# Patient Record
Sex: MALE | Race: BLACK OR AFRICAN AMERICAN | NOT HISPANIC OR LATINO | Employment: FULL TIME | URBAN - METROPOLITAN AREA
[De-identification: names, ages, dates, MRNs, and addresses within clinical notes are randomized per-mention and may not be internally consistent; named-entity substitution may affect disease eponyms.]

---

## 2017-01-31 ENCOUNTER — OFFICE VISIT (OUTPATIENT)
Dept: FAMILY MEDICINE | Facility: CLINIC | Age: 57
End: 2017-01-31
Payer: COMMERCIAL

## 2017-01-31 ENCOUNTER — LAB VISIT (OUTPATIENT)
Dept: LAB | Facility: HOSPITAL | Age: 57
End: 2017-01-31
Attending: FAMILY MEDICINE
Payer: COMMERCIAL

## 2017-01-31 VITALS
WEIGHT: 177.5 LBS | BODY MASS INDEX: 26.9 KG/M2 | HEART RATE: 80 BPM | TEMPERATURE: 98 F | HEIGHT: 68 IN | DIASTOLIC BLOOD PRESSURE: 76 MMHG | SYSTOLIC BLOOD PRESSURE: 142 MMHG

## 2017-01-31 DIAGNOSIS — Z00.00 GENERAL MEDICAL EXAM: ICD-10-CM

## 2017-01-31 DIAGNOSIS — E78.00 PURE HYPERCHOLESTEROLEMIA: ICD-10-CM

## 2017-01-31 DIAGNOSIS — N52.9 VASCULOGENIC ERECTILE DYSFUNCTION, UNSPECIFIED VASCULOGENIC ERECTILE DYSFUNCTION TYPE: ICD-10-CM

## 2017-01-31 DIAGNOSIS — R35.0 URINE FREQUENCY: ICD-10-CM

## 2017-01-31 DIAGNOSIS — L08.9 INFECTED SEBACEOUS CYST: ICD-10-CM

## 2017-01-31 DIAGNOSIS — I10 ESSENTIAL HYPERTENSION: ICD-10-CM

## 2017-01-31 DIAGNOSIS — L72.3 INFECTED SEBACEOUS CYST: ICD-10-CM

## 2017-01-31 LAB — COMPLEXED PSA SERPL-MCNC: 1.7 NG/ML

## 2017-01-31 PROCEDURE — 36415 COLL VENOUS BLD VENIPUNCTURE: CPT | Mod: PO

## 2017-01-31 PROCEDURE — 1159F MED LIST DOCD IN RCRD: CPT | Mod: S$GLB,,, | Performed by: FAMILY MEDICINE

## 2017-01-31 PROCEDURE — 3078F DIAST BP <80 MM HG: CPT | Mod: S$GLB,,, | Performed by: FAMILY MEDICINE

## 2017-01-31 PROCEDURE — 3045F PR MOST RECENT HEMOGLOBIN A1C LEVEL 7.0-9.0%: CPT | Mod: S$GLB,,, | Performed by: FAMILY MEDICINE

## 2017-01-31 PROCEDURE — 84153 ASSAY OF PSA TOTAL: CPT

## 2017-01-31 PROCEDURE — 99999 PR PBB SHADOW E&M-EST. PATIENT-LVL III: CPT | Mod: PBBFAC,,, | Performed by: FAMILY MEDICINE

## 2017-01-31 PROCEDURE — 99214 OFFICE O/P EST MOD 30 MIN: CPT | Mod: 25,S$GLB,, | Performed by: FAMILY MEDICINE

## 2017-01-31 PROCEDURE — 10160 PNXR ASPIR ABSC HMTMA BULLA: CPT | Mod: S$GLB,,, | Performed by: FAMILY MEDICINE

## 2017-01-31 PROCEDURE — 4010F ACE/ARB THERAPY RXD/TAKEN: CPT | Mod: S$GLB,,, | Performed by: FAMILY MEDICINE

## 2017-01-31 PROCEDURE — 3077F SYST BP >= 140 MM HG: CPT | Mod: S$GLB,,, | Performed by: FAMILY MEDICINE

## 2017-01-31 RX ORDER — AMLODIPINE BESYLATE 10 MG/1
10 TABLET ORAL NIGHTLY
Qty: 30 TABLET | Refills: 11 | Status: SHIPPED | OUTPATIENT
Start: 2017-01-31 | End: 2017-07-26 | Stop reason: SDUPTHER

## 2017-01-31 NOTE — MR AVS SNAPSHOT
[facility]  [address] [facility], [address]  [facility] [address]  Phone: [phone]  Fax: [phone]                  Parth Lund   2017 2:40 PM   Office Visit    Description:  Male : 1960   Provider:  Tara Pickett DO   Department:  [facility]           Reason for Visit     Lump     Hypertension           Diagnoses this Visit        Comments    DM (diabetes mellitus), type 2, uncontrolled, periph vascular complic    -  Primary     Essential hypertension         Pure hypercholesterolemia         General medical exam         Urine frequency         Vasculogenic erectile dysfunction, unspecified vasculogenic erectile dysfunction type                To Do List           Goals (5 Years of Data)     None       These Medications        Disp Refills Start End    amlodipine (NORVASC) 10 MG tablet 30 tablet 11 2017    Take 1 tablet (10 mg total) by mouth every evening. - Oral    Pharmacy: [facility] #[id number] [address] Ph #: [phone]         [facility] On Call     [facility] On Call Nurse Care Line -  Assistance  Registered nurses in the [facility] On Call Center provide clinical advisement, health education, appointment booking, and other advisory services.  Call for this free service at [phone].             Medications           START taking these NEW medications        Refills    amlodipine (NORVASC) 10 MG tablet 11    Sig: Take 1 tablet (10 mg total) by mouth every evening.    Class: Normal    Route: Oral           Verify that the below list of medications is an accurate representation of the medications you are currently taking.  If none reported, the list may be blank. If incorrect, please contact your healthcare provider. Carry this list with you in case of emergency.           Current Medications     atorvastatin (LIPITOR) 20 MG tablet TAKE ONE TABLET BY MOUTH EVERY DAY    blood sugar diagnostic Strp 1 each  "by Misc.(Non-Drug; Combo Route) route 2 (two) times daily.    lancets Misc 1 each by Misc.(Non-Drug; Combo Route) route 2 (two) times daily. One touch ultra mini    losartan (COZAAR) 100 MG tablet Take 1 tablet (100 mg total) by mouth once daily.    sildenafil (VIAGRA) 100 MG tablet Take 1 tablet (100 mg total) by mouth daily as needed for Erectile Dysfunction.    amlodipine (NORVASC) 10 MG tablet Take 1 tablet (10 mg total) by mouth every evening.    metformin (GLUCOPHAGE) 1000 MG tablet Take 1 tablet (1,000 mg total) by mouth daily with breakfast.           Clinical Reference Information           Vital Signs - Last Recorded  Most recent update: 1/31/2017  2:34 PM by Kristie Trejo MA    BP Pulse Temp Ht Wt BMI    (!) 142/76 (BP Location: Right arm, Patient Position: Sitting, BP Method: Manual) 80 98 °F (36.7 °C) (Oral) 5' 8" (1.727 m) 80.5 kg (177 lb 7.5 oz) 26.98 kg/m2      Blood Pressure          Most Recent Value    BP  (!)  142/76      Allergies as of 1/31/2017     No Known Allergies      Immunizations Administered on Date of Encounter - 1/31/2017     None      Orders Placed During Today's Visit      Normal Orders This Visit    Microalbumin/creatinine urine ratio     Future Labs/Procedures Expected by Expires    CBC auto differential  1/31/2017 4/1/2018    Comprehensive metabolic panel  1/31/2017 4/1/2018    Hemoglobin A1c  1/31/2017 4/1/2018    Lipid panel  1/31/2017 4/1/2018    PSA, Screening  1/31/2017 4/1/2018    TSH  1/31/2017 4/1/2018      "

## 2017-01-31 NOTE — PROGRESS NOTES
Subjective:       Patient ID: Parth Lund is a 56 y.o. male.    Chief Complaint: diabetes, and Hypertension   HPI   This pleasant 56 y.o. Black or  male  presents for management  of their medical problems including   Patient Active Problem List   Diagnosis    Hypertension    DM (diabetes mellitus), type 2, uncontrolled, periph vascular complic    Cortical cataract - Both Eyes    HLD (hyperlipidemia)    he is feeling fine overall    He is still drinking some sugary drinks daily, some carbs (rice and beens) etc  He is active with work.  He is also complaining of urine frequency and mild urgency. He has to urinate every 2 hours in daytime and hast to get up 2 or 3 times a night.   He sometimes feels he is not emptying his bladder completely.  Review of Systems  no fever or chills or nausea or vomiting, no edema   Objective:      Physical Exam   Constitutional: He is oriented to person, place, and time. He appears well-developed.   HENT:   Head: Normocephalic and atraumatic.   Cardiovascular: Normal rate, regular rhythm, normal heart sounds and intact distal pulses.    Pulmonary/Chest: Effort normal and breath sounds normal.   Abdominal: Soft. Bowel sounds are normal.   Genitourinary: Rectum normal and prostate normal.   Genitourinary Comments: Prostate normal size and shape, nontender no masses   Musculoskeletal: Normal range of motion. He exhibits no edema.   Neurological: He is alert and oriented to person, place, and time.   Skin: Skin is warm and dry.   Psychiatric: He has a normal mood and affect. His behavior is normal. Judgment and thought content normal.   Nursing note and vitals reviewed.      Assessment:         Parth was seen today for lump and hypertension.    Diagnoses and all orders for this visit:    DM (diabetes mellitus), type 2, uncontrolled, periph vascular complic  -     Hemoglobin A1c; Future  -     Cancel: Microalbumin/creatinine urine ratio    Essential hypertension  -      Cancel: Microalbumin/creatinine urine ratio  -     Increase amlodipine (NORVASC) 10 MG tablet; Take 1 tablet (10 mg total) by mouth every evening.  -       -     Microalbumin/creatinine urine ratio; Future    Pure hypercholesterolemia  -     Lipid panel; Future    General medical exam-rtc for this   -     CBC auto differential; Future  -     Comprehensive metabolic panel; Future  -     Lipid panel; Future  -     TSH; Future    Urine frequency-possible BPH vs bladder instability     -     PSA, Screening; Future    Vasculogenic erectile dysfunction, unspecified vasculogenic erectile dysfunction type  Continuing current management.-if PSA is normal then consider trial of cialis or possible oxybutanin     Will need Recheck 3 months for pe and bp check-but will let pt know after labs are back for psa

## 2017-02-01 ENCOUNTER — TELEPHONE (OUTPATIENT)
Dept: FAMILY MEDICINE | Facility: CLINIC | Age: 57
End: 2017-02-01

## 2017-02-01 NOTE — PROGRESS NOTES
Subjective:       Patient ID: Parth Lund is a 56 y.o. male.    Chief Complaint:  Arm mass  HPI swollen mass in rt axilla present for 1 week, no fever no nausea no drainage, no chillse  Review of Systems  per HPI  Objective:      Physical Exam   Constitutional: He appears well-developed and well-nourished.   Skin:   Rt axilla with a Red, erythematous swollen mass approx 2 inches in diameter with central dark pore      Nursing note and vitals reviewed.      Assessment:         infected sebaceous cyst  under sterile technique the lesion was anesthetized with   0.3  cc of 1% lidocaine with epi and opened  with a 3 mm punch biopsy. A moderate amount of pus and sebaceous material was drained from the wound and the cyst wall came out as well. patient tolerated the procedure well. There was good hemostasis. The wound was dressed with bacitracin and a sterile bandage. Patient  is to keep the wound clean . The may get it wet but pat dry and then reapply bacitracin and a bandage.. Notify me if any signs or symptoms of infection.

## 2017-02-01 NOTE — TELEPHONE ENCOUNTER
Please notify Mr Lund that his PSA was normal. Let him know that if he would like to try medication for his urine frequency problem  But then again if its not too bothersome he can wait and we can just wait and see how things go.  Dr Pickett

## 2017-02-03 LAB — BACTERIA SPEC AEROBE CULT: NORMAL

## 2017-03-30 RX ORDER — ATORVASTATIN CALCIUM 20 MG/1
TABLET, FILM COATED ORAL
Qty: 30 TABLET | Refills: 4 | Status: SHIPPED | OUTPATIENT
Start: 2017-03-30 | End: 2017-08-16 | Stop reason: SDUPTHER

## 2017-03-30 RX ORDER — LOSARTAN POTASSIUM 50 MG/1
TABLET ORAL
Qty: 30 TABLET | Refills: 4 | Status: SHIPPED | OUTPATIENT
Start: 2017-03-30 | End: 2017-08-16 | Stop reason: SDUPTHER

## 2017-03-30 NOTE — TELEPHONE ENCOUNTER
----- Message from Monica Del Toro sent at 3/30/2017  2:00 PM CDT -----  Contact: self/[phone]  Pt called in regard to getting a Rx refill for losartan (COZAAR) 100 MG tablet/atorvastatin (LIPITOR) 20 MG tablet/ and a epp appointment.        Please advise

## 2017-04-11 ENCOUNTER — TELEPHONE (OUTPATIENT)
Dept: FAMILY MEDICINE | Facility: CLINIC | Age: 57
End: 2017-04-11

## 2017-04-11 DIAGNOSIS — Z00.00 ROUTINE GENERAL MEDICAL EXAMINATION AT A HEALTH CARE FACILITY: Primary | ICD-10-CM

## 2017-04-11 NOTE — TELEPHONE ENCOUNTER
----- Message from Jose Guadalupe Mi MA sent at 4/11/2017 11:59 AM CDT -----  Contact: LAB APPT   An appointment for an annual physical has been scheduled for 7/25/17    The patient is requesting prior labs.    A lab appointment is scheduled for 7/18/17.  Please link labs to the scheduled appointment.    If the lab appointment is not appropriate, please cancel appointment and notify the patient.    Thank you!

## 2017-07-18 ENCOUNTER — LAB VISIT (OUTPATIENT)
Dept: LAB | Facility: HOSPITAL | Age: 57
End: 2017-07-18
Attending: FAMILY MEDICINE
Payer: COMMERCIAL

## 2017-07-18 DIAGNOSIS — E78.00 PURE HYPERCHOLESTEROLEMIA: ICD-10-CM

## 2017-07-18 DIAGNOSIS — Z00.00 ROUTINE GENERAL MEDICAL EXAMINATION AT A HEALTH CARE FACILITY: ICD-10-CM

## 2017-07-18 DIAGNOSIS — Z00.00 GENERAL MEDICAL EXAM: ICD-10-CM

## 2017-07-18 LAB
ALBUMIN SERPL BCP-MCNC: 3.9 G/DL
ALP SERPL-CCNC: 126 U/L
ALT SERPL W/O P-5'-P-CCNC: 28 U/L
ANION GAP SERPL CALC-SCNC: 14 MMOL/L
AST SERPL-CCNC: 18 U/L
BASOPHILS # BLD AUTO: 0.03 K/UL
BASOPHILS NFR BLD: 0.6 %
BILIRUB SERPL-MCNC: 0.7 MG/DL
BUN SERPL-MCNC: 12 MG/DL
CALCIUM SERPL-MCNC: 9.4 MG/DL
CHLORIDE SERPL-SCNC: 102 MMOL/L
CHOLEST/HDLC SERPL: 2.7 {RATIO}
CO2 SERPL-SCNC: 22 MMOL/L
CREAT SERPL-MCNC: 1.4 MG/DL
DIFFERENTIAL METHOD: ABNORMAL
EOSINOPHIL # BLD AUTO: 0.2 K/UL
EOSINOPHIL NFR BLD: 5 %
ERYTHROCYTE [DISTWIDTH] IN BLOOD BY AUTOMATED COUNT: 12.9 %
EST. GFR  (AFRICAN AMERICAN): >60 ML/MIN/1.73 M^2
EST. GFR  (NON AFRICAN AMERICAN): 55 ML/MIN/1.73 M^2
GLUCOSE SERPL-MCNC: 189 MG/DL
HCT VFR BLD AUTO: 40.7 %
HDL/CHOLESTEROL RATIO: 36.4 %
HDLC SERPL-MCNC: 118 MG/DL
HDLC SERPL-MCNC: 43 MG/DL
HGB BLD-MCNC: 13.1 G/DL
LDLC SERPL CALC-MCNC: 62 MG/DL
LYMPHOCYTES # BLD AUTO: 1.1 K/UL
LYMPHOCYTES NFR BLD: 23.5 %
MCH RBC QN AUTO: 27.8 PG
MCHC RBC AUTO-ENTMCNC: 32.2 %
MCV RBC AUTO: 86 FL
MONOCYTES # BLD AUTO: 0.5 K/UL
MONOCYTES NFR BLD: 9.9 %
NEUTROPHILS # BLD AUTO: 2.9 K/UL
NEUTROPHILS NFR BLD: 60.8 %
NONHDLC SERPL-MCNC: 75 MG/DL
PLATELET # BLD AUTO: 252 K/UL
PMV BLD AUTO: 11.4 FL
POTASSIUM SERPL-SCNC: 3.6 MMOL/L
PROT SERPL-MCNC: 7.5 G/DL
RBC # BLD AUTO: 4.72 M/UL
SODIUM SERPL-SCNC: 138 MMOL/L
TRIGL SERPL-MCNC: 65 MG/DL
TSH SERPL DL<=0.005 MIU/L-ACNC: 0.78 UIU/ML
WBC # BLD AUTO: 4.76 K/UL

## 2017-07-18 PROCEDURE — 80061 LIPID PANEL: CPT

## 2017-07-18 PROCEDURE — 83036 HEMOGLOBIN GLYCOSYLATED A1C: CPT

## 2017-07-18 PROCEDURE — 36415 COLL VENOUS BLD VENIPUNCTURE: CPT | Mod: PO

## 2017-07-18 PROCEDURE — 84443 ASSAY THYROID STIM HORMONE: CPT

## 2017-07-18 PROCEDURE — 80053 COMPREHEN METABOLIC PANEL: CPT

## 2017-07-18 PROCEDURE — 85025 COMPLETE CBC W/AUTO DIFF WBC: CPT

## 2017-07-19 LAB
ESTIMATED AVG GLUCOSE: 197 MG/DL
HBA1C MFR BLD HPLC: 8.5 %

## 2017-07-25 ENCOUNTER — OFFICE VISIT (OUTPATIENT)
Dept: FAMILY MEDICINE | Facility: CLINIC | Age: 57
End: 2017-07-25
Payer: COMMERCIAL

## 2017-07-25 VITALS
BODY MASS INDEX: 26.73 KG/M2 | DIASTOLIC BLOOD PRESSURE: 70 MMHG | TEMPERATURE: 98 F | SYSTOLIC BLOOD PRESSURE: 126 MMHG | HEART RATE: 84 BPM | WEIGHT: 176.38 LBS | HEIGHT: 68 IN

## 2017-07-25 DIAGNOSIS — E78.5 HYPERLIPIDEMIA ASSOCIATED WITH TYPE 2 DIABETES MELLITUS: ICD-10-CM

## 2017-07-25 DIAGNOSIS — Z91.89 FRAMINGHAM CARDIAC RISK 10-20% IN NEXT 10 YEARS: ICD-10-CM

## 2017-07-25 DIAGNOSIS — E11.59 HYPERTENSION ASSOCIATED WITH DIABETES: ICD-10-CM

## 2017-07-25 DIAGNOSIS — I15.2 HYPERTENSION ASSOCIATED WITH DIABETES: ICD-10-CM

## 2017-07-25 DIAGNOSIS — E11.69 HYPERLIPIDEMIA ASSOCIATED WITH TYPE 2 DIABETES MELLITUS: ICD-10-CM

## 2017-07-25 DIAGNOSIS — E13.51 PERIPHERAL VASCULAR DISEASE DUE TO SECONDARY DIABETES: ICD-10-CM

## 2017-07-25 DIAGNOSIS — M79.89 SWELLING OF RIGHT HAND: ICD-10-CM

## 2017-07-25 DIAGNOSIS — Z00.00 ANNUAL PHYSICAL EXAM: Primary | ICD-10-CM

## 2017-07-25 DIAGNOSIS — Z12.11 SCREENING FOR COLON CANCER: ICD-10-CM

## 2017-07-25 DIAGNOSIS — E11.59 TYPE 2 DIABETES MELLITUS WITH OTHER CIRCULATORY COMPLICATION, WITHOUT LONG-TERM CURRENT USE OF INSULIN: ICD-10-CM

## 2017-07-25 LAB
CREAT UR-MCNC: 127 MG/DL
MICROALBUMIN UR DL<=1MG/L-MCNC: 4 UG/ML
MICROALBUMIN/CREATININE RATIO: 3.1 UG/MG

## 2017-07-25 PROCEDURE — 99396 PREV VISIT EST AGE 40-64: CPT | Mod: S$GLB,,, | Performed by: NURSE PRACTITIONER

## 2017-07-25 PROCEDURE — 99999 PR PBB SHADOW E&M-EST. PATIENT-LVL IV: CPT | Mod: PBBFAC,,, | Performed by: NURSE PRACTITIONER

## 2017-07-25 PROCEDURE — 1158F ADVNC CARE PLAN TLK DOCD: CPT | Mod: S$GLB,,, | Performed by: NURSE PRACTITIONER

## 2017-07-25 PROCEDURE — 82570 ASSAY OF URINE CREATININE: CPT

## 2017-07-25 RX ORDER — GLYBURIDE 5 MG/1
5 TABLET ORAL
Qty: 90 TABLET | Refills: 3 | Status: SHIPPED | OUTPATIENT
Start: 2017-07-25 | End: 2017-12-20 | Stop reason: SDUPTHER

## 2017-07-25 NOTE — PROGRESS NOTES
"Subjective:       Patient ID: Parth Lund is a 57 y.o. male.    Chief Complaint: Annual Exam    Pt here for annual exam, review of fasting labs and medical management of his DM, HTN, HLD.  No complaints. Pt states that he does not need any med refills at this time.   Pt admits that he is not checking his blood sugar on a regular basis.    Pt is not taking a low dose ASA daily        Past Medical History:   Diagnosis Date    Cortical cataract - Both Eyes 2/17/2014    Diabetes mellitus, type 2     Hypertension      History reviewed. No pertinent surgical history.  Social History     Social History Narrative    Physically active with work and exercise regimen- bike and jogs     Family History   Problem Relation Age of Onset    Cancer Mother      throat    Hypertension Father     Stroke Father     Hyperlipidemia Father     Diabetes Father     Heart disease Father     Blindness Father     Cataracts Father     Hypertension Sister     Hypertension Son     No Known Problems Daughter     No Known Problems Sister     No Known Problems Sister     No Known Problems Daughter     No Known Problems Son     Amblyopia Neg Hx     Glaucoma Neg Hx     Macular degeneration Neg Hx     Retinal detachment Neg Hx     Strabismus Neg Hx      Vitals:    07/25/17 0844   BP: 126/70   Pulse: 84   Temp: 98.1 °F (36.7 °C)   Weight: 80 kg (176 lb 5.9 oz)   Height: 5' 8" (1.727 m)   PainSc:   5     Outpatient Encounter Prescriptions as of 7/25/2017   Medication Sig Dispense Refill    amlodipine (NORVASC) 10 MG tablet Take 1 tablet (10 mg total) by mouth every evening. 30 tablet 11    atorvastatin (LIPITOR) 20 MG tablet TAKE ONE TABLET BY MOUTH EVERY DAY 30 tablet 4    blood sugar diagnostic Strp 1 each by Misc.(Non-Drug; Combo Route) route 2 (two) times daily. 100 each 5    lancets Misc 1 each by Misc.(Non-Drug; Combo Route) route 2 (two) times daily. One touch ultra mini 100 each 5    losartan (COZAAR) 50 MG tablet " "TAKE  ONE TABLET BY MOUTH DAILY 30 tablet 4    metformin (GLUCOPHAGE) 1000 MG tablet Take 1 tablet (1,000 mg total) by mouth daily with breakfast. 30 tablet 5    sildenafil (VIAGRA) 100 MG tablet Take 1 tablet (100 mg total) by mouth daily as needed for Erectile Dysfunction. 5 tablet 5    glyBURIDE (DIABETA) 5 MG tablet Take 1 tablet (5 mg total) by mouth daily with breakfast. 90 tablet 3     No facility-administered encounter medications on file as of 7/25/2017.      Wt Readings from Last 3 Encounters:   07/25/17 80 kg (176 lb 5.9 oz)   01/31/17 80.5 kg (177 lb 7.5 oz)   10/07/16 80.2 kg (176 lb 12.9 oz)     Last 3 sets of Vitals    Vitals - 1 value per visit 10/7/2016 1/31/2017 7/25/2017   SYSTOLIC 168 142 126   DIASTOLIC 86 76 70   PULSE 68 80 84   TEMPERATURE 98.2 98 98.1   RESPIRATIONS - - -   SPO2 - - -   Weight (lb) 176.81 177.47 176.37   Weight (kg) 80.2 80.5 80   HEIGHT 5' 8.25" 5' 8" 5' 8"   BODY MASS INDEX 26.69 26.98 26.82   VISIT REPORT - - -   Pain Score  8 3 5   Some recent data might be hidden     No results found for: CBC  Review of Systems   Constitutional: Negative for chills, fatigue, fever and unexpected weight change.   HENT: Negative for dental problem.    Eyes: Negative for photophobia and visual disturbance.   Respiratory: Negative for cough.    Cardiovascular: Negative for chest pain and palpitations.   Gastrointestinal: Negative for abdominal pain and blood in stool.   Endocrine: Negative for polydipsia, polyphagia and polyuria.   Musculoskeletal: Positive for joint swelling. Negative for arthralgias, myalgias, neck pain and neck stiffness.        Right hand MCP joint on 2nd and middle finger   Skin: Negative for rash.   Neurological: Negative for dizziness.   Hematological: Negative for adenopathy. Does not bruise/bleed easily.   Psychiatric/Behavioral: Negative for sleep disturbance.       Objective:      Physical Exam   Constitutional: He is oriented to person, place, and time. Vital " signs are normal. He appears well-developed and well-nourished.  Non-toxic appearance. He does not have a sickly appearance. He does not appear ill. No distress.   HENT:   Head: Normocephalic and atraumatic.   Right Ear: Tympanic membrane, external ear and ear canal normal.   Left Ear: Tympanic membrane, external ear and ear canal normal.   Nose: Nose normal. No mucosal edema, rhinorrhea, nasal deformity or septal deviation. Right sinus exhibits no frontal sinus tenderness. Left sinus exhibits no frontal sinus tenderness.   Mouth/Throat: Oropharynx is clear and moist. No oropharyngeal exudate.   Eyes: Conjunctivae, EOM and lids are normal. Pupils are equal, round, and reactive to light. Right eye exhibits no discharge. Left eye exhibits no discharge. No scleral icterus.   Neck: Trachea normal, normal range of motion and phonation normal. Neck supple. No JVD present.   Cardiovascular: Normal rate, regular rhythm, normal heart sounds and intact distal pulses.    No murmur heard.  Pulmonary/Chest: Effort normal and breath sounds normal. No respiratory distress. He has no wheezes. He has no rales. He exhibits no tenderness.   Abdominal: Soft. Bowel sounds are normal. He exhibits no distension and no mass. There is no tenderness. There is no rebound and no guarding. No hernia.   Musculoskeletal: Normal range of motion. He exhibits edema. He exhibits no tenderness.        Right hand: He exhibits swelling. He exhibits normal range of motion, no tenderness, no bony tenderness, normal two-point discrimination, normal capillary refill, no deformity and no laceration. Normal sensation noted. Normal strength noted.        Hands:  Lymphadenopathy:     He has no cervical adenopathy.   Neurological: He is alert and oriented to person, place, and time. He has normal reflexes. He displays normal reflexes. No cranial nerve deficit. He exhibits normal muscle tone. Coordination normal.   Skin: Skin is warm and dry. No rash noted. He is  not diaphoretic. No erythema. No pallor.   Psychiatric: He has a normal mood and affect. His behavior is normal. Judgment and thought content normal.   Nursing note and vitals reviewed.         Hemoglobin A1C   Date Value Ref Range Status   07/18/2017 8.5 (H) 4.0 - 5.6 % Final     Comment:     According to ADA guidelines, hemoglobin A1c <7.0% represents  optimal control in non-pregnant diabetic patients. Different  metrics may apply to specific patient populations.   Standards of Medical Care in Diabetes-2016.  For the purpose of screening for the presence of diabetes:  <5.7%     Consistent with the absence of diabetes  5.7-6.4%  Consistent with increasing risk for diabetes   (prediabetes)  >or=6.5%  Consistent with diabetes  Currently, no consensus exists for use of hemoglobin A1c  for diagnosis of diabetes for children.  This Hemoglobin A1c assay has significant interference with fetal   hemoglobin   (HbF). The results are invalid for patients with abnormal amounts of   HbF,   including those with known Hereditary Persistence   of Fetal Hemoglobin. Heterozygous hemoglobin variants (HbAS, HbAC,   HbAD, HbAE, HbA2) do not significantly interfere with this assay;   however, presence of multiple variants in a sample may impact the %   interference.     11/10/2016 7.2 (H) 4.5 - 6.2 % Final     Comment:     According to ADA guidelines, hemoglobin A1C <7.0% represents  optimal control in non-pregnant diabetic patients.  Different  metrics may apply to specific populations.   Standards of Medical Care in Diabetes - 2016.  For the purpose of screening for the presence of diabetes:  <5.7%     Consistent with the absence of diabetes  5.7-6.4%  Consistent with increasing risk for diabetes   (prediabetes)  >or=6.5%  Consistent with diabetes  Currently no consensus exists for use of hemoglobin A1C  for diagnosis of diabetes for children.     04/29/2016 7.2 (H) 4.5 - 6.2 % Final         Chemistry        Component Value Date/Time      07/18/2017 1130    K 3.6 07/18/2017 1130     07/18/2017 1130    CO2 22 (L) 07/18/2017 1130    BUN 12 07/18/2017 1130    CREATININE 1.4 07/18/2017 1130     (H) 07/18/2017 1130        Component Value Date/Time    CALCIUM 9.4 07/18/2017 1130    ALKPHOS 126 07/18/2017 1130    AST 18 07/18/2017 1130    ALT 28 07/18/2017 1130    BILITOT 0.7 07/18/2017 1130    ESTGFRAFRICA >60.0 07/18/2017 1130    EGFRNONAA 55.0 (A) 07/18/2017 1130            Lab Results   Component Value Date    CHOL 118 (L) 07/18/2017    CHOL 110 (L) 04/29/2016    CHOL 157 02/24/2015       Lab Results   Component Value Date    HDL 43 07/18/2017    HDL 45 04/29/2016    HDL 47 02/24/2015       Lab Results   Component Value Date    LDLCALC 62.0 (L) 07/18/2017    LDLCALC 57.4 (L) 04/29/2016    LDLCALC 103.4 02/24/2015       Lab Results   Component Value Date    TRIG 65 07/18/2017    TRIG 38 04/29/2016    TRIG 33 02/24/2015       Lab Results   Component Value Date    CHOLHDL 36.4 07/18/2017    CHOLHDL 40.9 04/29/2016    CHOLHDL 29.9 02/24/2015           Lab Results   Component Value Date    MICALBCREAT 3.5 05/02/2016       Lab Results   Component Value Date    TSH 0.783 07/18/2017           Estimated Creatinine Clearance: 55.6 mL/min (based on Cr of 1.4).        No results found for: [id number]     Lab Results   Component Value Date    WBC 4.76 07/18/2017    RBC 4.72 07/18/2017    HGB 13.1 (L) 07/18/2017    HCT 40.7 07/18/2017    MCV 86 07/18/2017    MCH 27.8 07/18/2017    MCHC 32.2 07/18/2017    RDW 12.9 07/18/2017     07/18/2017    MPV 11.4 07/18/2017    GRAN 2.9 07/18/2017    GRAN 60.8 07/18/2017    LYMPH 1.1 07/18/2017    LYMPH 23.5 07/18/2017    MONO 0.5 07/18/2017    MONO 9.9 07/18/2017    EOS 0.2 07/18/2017    BASO 0.03 07/18/2017    EOSINOPHIL 5.0 07/18/2017    BASOPHIL 0.6 07/18/2017         Assessment:       1. Annual physical exam    2. Type 2 diabetes mellitus with other circulatory complication, without long-term  current use of insulin    3. Hyperlipidemia associated with type 2 diabetes mellitus    4. Hypertension associated with diabetes    5. Peripheral vascular disease due to secondary diabetes    6. Screening for colon cancer    7. [place] cardiac risk 10-20% in next 10 years    8. Swelling of right hand        Plan:         ADA STANDARDS of CARE:  ACE inhibitor of angiotensin II receptor blocker: Cozaar 50 mg qd  Statin drug:Atorvastatin 20 mg    Low dose ASA: discussed today pt to get OTC and start today  Eye exam within last year: ordered last 5/2016  Dental exam: discussed community resources and need for exam q6 months  Flu shot: not available  Microalbumin: 7/25/2017  Documentation of foot exam:1/313/2017    Discussed goals of DM care and risk of end organ damage to kidneys, heart, retina, capillaries and nerves.  Pt verbalized understanding.  Discussed increased risk of MI/CVA due to DM.  Parth was seen today for annual exam.    Diagnoses and all orders for this visit:    Annual physical exam  Comments:  fasting labs reviewed in detail, Advanced DIrectives packet given    Orders:  -     POCT Hemocult Stool X3  -     Ambulatory referral to Optometry    Type 2 diabetes mellitus with other circulatory complication, without long-term current use of insulin  Comments:  Cont Metformin, add GLyburide f/u 3 months    Orders:  -     MICROALBUMIN / CREATININE RATIO URINE  -     Hemoglobin A1c; Future  -     Ambulatory referral to Optometry  -     glyBURIDE (DIABETA) 5 MG tablet; Take 1 tablet (5 mg total) by mouth daily with breakfast.    Hyperlipidemia associated with type 2 diabetes mellitus  Comments:  continue Lipitor      Hypertension associated with diabetes  Comments:  Continue Norvasc and Cozaar    Orders:  -     Ambulatory referral to Optometry    Peripheral vascular disease due to secondary diabetes  Comments:  on statin    Orders:  -     Ambulatory referral to Optometry    Screening for colon  cancer  Comments:  hemoccult x 3 given    Orders:  -     POCT Hemocult Stool X3    [place] cardiac risk 10-20% in next 10 years  Comments:  19.6% calculated  on statin pt to start 81 mg ASA      Swelling of right hand  Comments:  most likely some arthritis 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  Blood sugar 2 HOURS AFTER A MEAL 140 or less  All diabetics should take the following unless allergic:  Baby aspirin 81 mg one daily, available over the counter, to help prevent a stroke or heart attack  A medication called an ACE inhibitor or an ARB to protect the kidneys ( Lisinopril, Losartan)  A statin ( Zocor, Mevacor, Crestor, Lipitor) even if your cholesterol is normal to help prevent a stroke or heart attack  Flu shot every year  Eye exam ever year  Dental exam every 6 months    You should be checking your blood sugar every morning before you eat or drink and then once during the day 2 HOURS AFTER A MEAL, it does not matter which meal.    Use a cream or vaseline on feet every night as discussed    Start Glyburide in the  morning with your Metformin    [email or web address] for resources for dental care.

## 2017-07-25 NOTE — PATIENT INSTRUCTIONS
Follow up in 3 months for your diabetes, get a A1C drawn a day or two before that appointment    Goal is fasting blood sugar in am 110 or less.  Blood sugar 2 HOURS AFTER A MEAL 140 or less  All diabetics should take the following unless allergic:  Baby aspirin 81 mg one daily, available over the counter, to help prevent a stroke or heart attack  A medication called an ACE inhibitor or an ARB to protect the kidneys ( Lisinopril, Losartan)  A statin ( Zocor, Mevacor, Crestor, Lipitor) even if your cholesterol is normal to help prevent a stroke or heart attack  Flu shot every year  Eye exam ever year  Dental exam every 6 months    You should be checking your blood sugar every morning before you eat or drink and then once during the day 2 HOURS AFTER A MEAL, it does not matter which meal.    Use a cream or vaseline on feet every night as discussed    Start Glyburide in the  morning with your Metformin    [email or web address] for resources for dental care.

## 2017-07-26 ENCOUNTER — TELEPHONE (OUTPATIENT)
Dept: FAMILY MEDICINE | Facility: CLINIC | Age: 57
End: 2017-07-26

## 2017-07-26 DIAGNOSIS — I10 ESSENTIAL HYPERTENSION: ICD-10-CM

## 2017-07-26 DIAGNOSIS — E11.9 TYPE 2 DIABETES MELLITUS WITHOUT COMPLICATION, WITHOUT LONG-TERM CURRENT USE OF INSULIN: Primary | ICD-10-CM

## 2017-07-26 RX ORDER — AMLODIPINE BESYLATE 10 MG/1
10 TABLET ORAL NIGHTLY
Qty: 30 TABLET | Refills: 5 | Status: SHIPPED | OUTPATIENT
Start: 2017-07-26 | End: 2018-02-25 | Stop reason: SDUPTHER

## 2017-07-27 NOTE — TELEPHONE ENCOUNTER
Message left on voicemail instructing patient to return call for test results and further instructions.

## 2017-07-27 NOTE — TELEPHONE ENCOUNTER
Please notify patient that his blood sugar is much higher and we need to change his medication a little. .I recommend we change from plain metformin to another med that has metformin and something else.   I will send the prescription for the new med. Then he needs another long term blood sugar test and kidney function test in 2-3 months

## 2017-08-07 NOTE — TELEPHONE ENCOUNTER
Spoke with pt.  Advised last lab shows blood sugar is much higher.  Dr. Pickett changing from plain Metformin 1,000 mg to Janumet which is a combination of sitagliptan and metformin.    Follow up appt has been scheduled 10/25 with fasting lab 10/20. Appt confirms printed and mailed to pt.

## 2017-08-09 NOTE — TELEPHONE ENCOUNTER
Spoke with Ashley at [facility] 7/26/17 Rx Janumet was not rec'd.  Verbal order accepted.   Did not need PA, but pt's monthly cost will be $62.95.

## 2017-08-16 RX ORDER — LOSARTAN POTASSIUM 50 MG/1
TABLET ORAL
Qty: 30 TABLET | Refills: 3 | Status: SHIPPED | OUTPATIENT
Start: 2017-08-16 | End: 2017-12-20 | Stop reason: SDUPTHER

## 2017-08-16 RX ORDER — ATORVASTATIN CALCIUM 20 MG/1
TABLET, FILM COATED ORAL
Qty: 30 TABLET | Refills: 3 | Status: SHIPPED | OUTPATIENT
Start: 2017-08-16 | End: 2017-12-20 | Stop reason: SDUPTHER

## 2017-10-20 ENCOUNTER — LAB VISIT (OUTPATIENT)
Dept: LAB | Facility: HOSPITAL | Age: 57
End: 2017-10-20
Attending: FAMILY MEDICINE
Payer: COMMERCIAL

## 2017-10-20 DIAGNOSIS — E11.9 TYPE 2 DIABETES MELLITUS WITHOUT COMPLICATION, WITHOUT LONG-TERM CURRENT USE OF INSULIN: ICD-10-CM

## 2017-10-20 LAB
ANION GAP SERPL CALC-SCNC: 9 MMOL/L
BUN SERPL-MCNC: 13 MG/DL
CALCIUM SERPL-MCNC: 9.4 MG/DL
CHLORIDE SERPL-SCNC: 103 MMOL/L
CO2 SERPL-SCNC: 28 MMOL/L
CREAT SERPL-MCNC: 1.1 MG/DL
EST. GFR  (AFRICAN AMERICAN): >60 ML/MIN/1.73 M^2
EST. GFR  (NON AFRICAN AMERICAN): >60 ML/MIN/1.73 M^2
ESTIMATED AVG GLUCOSE: 137 MG/DL
GLUCOSE SERPL-MCNC: 70 MG/DL
HBA1C MFR BLD HPLC: 6.4 %
POTASSIUM SERPL-SCNC: 3.6 MMOL/L
SODIUM SERPL-SCNC: 140 MMOL/L

## 2017-10-20 PROCEDURE — 80048 BASIC METABOLIC PNL TOTAL CA: CPT

## 2017-10-20 PROCEDURE — 83036 HEMOGLOBIN GLYCOSYLATED A1C: CPT

## 2017-10-20 PROCEDURE — 36415 COLL VENOUS BLD VENIPUNCTURE: CPT | Mod: PO

## 2017-10-25 ENCOUNTER — OFFICE VISIT (OUTPATIENT)
Dept: FAMILY MEDICINE | Facility: CLINIC | Age: 57
End: 2017-10-25
Payer: COMMERCIAL

## 2017-10-25 ENCOUNTER — LAB VISIT (OUTPATIENT)
Dept: LAB | Facility: HOSPITAL | Age: 57
End: 2017-10-25
Attending: FAMILY MEDICINE
Payer: COMMERCIAL

## 2017-10-25 VITALS
WEIGHT: 176.38 LBS | HEIGHT: 68 IN | DIASTOLIC BLOOD PRESSURE: 80 MMHG | HEART RATE: 72 BPM | BODY MASS INDEX: 26.73 KG/M2 | TEMPERATURE: 98 F | SYSTOLIC BLOOD PRESSURE: 120 MMHG

## 2017-10-25 DIAGNOSIS — M25.50 ARTHRALGIA, UNSPECIFIED JOINT: ICD-10-CM

## 2017-10-25 DIAGNOSIS — I10 ESSENTIAL HYPERTENSION: ICD-10-CM

## 2017-10-25 DIAGNOSIS — Z23 NEED FOR PROPHYLACTIC VACCINATION AND INOCULATION AGAINST INFLUENZA: Primary | ICD-10-CM

## 2017-10-25 DIAGNOSIS — E78.00 PURE HYPERCHOLESTEROLEMIA: ICD-10-CM

## 2017-10-25 LAB
CCP AB SER IA-ACNC: <0.5 U/ML
CRP SERPL-MCNC: 2.2 MG/L
ERYTHROCYTE [SEDIMENTATION RATE] IN BLOOD BY WESTERGREN METHOD: 3 MM/HR
URATE SERPL-MCNC: 5.1 MG/DL

## 2017-10-25 PROCEDURE — 86038 ANTINUCLEAR ANTIBODIES: CPT

## 2017-10-25 PROCEDURE — 90471 IMMUNIZATION ADMIN: CPT | Mod: S$GLB,,, | Performed by: FAMILY MEDICINE

## 2017-10-25 PROCEDURE — 99214 OFFICE O/P EST MOD 30 MIN: CPT | Mod: 25,S$GLB,, | Performed by: FAMILY MEDICINE

## 2017-10-25 PROCEDURE — 36415 COLL VENOUS BLD VENIPUNCTURE: CPT | Mod: PO

## 2017-10-25 PROCEDURE — 90686 IIV4 VACC NO PRSV 0.5 ML IM: CPT | Mod: S$GLB,,, | Performed by: FAMILY MEDICINE

## 2017-10-25 PROCEDURE — 84550 ASSAY OF BLOOD/URIC ACID: CPT

## 2017-10-25 PROCEDURE — 99999 PR PBB SHADOW E&M-EST. PATIENT-LVL III: CPT | Mod: PBBFAC,,, | Performed by: FAMILY MEDICINE

## 2017-10-25 PROCEDURE — 85651 RBC SED RATE NONAUTOMATED: CPT

## 2017-10-25 PROCEDURE — 86200 CCP ANTIBODY: CPT

## 2017-10-25 PROCEDURE — 86140 C-REACTIVE PROTEIN: CPT

## 2017-10-25 PROCEDURE — 86431 RHEUMATOID FACTOR QUANT: CPT

## 2017-10-25 NOTE — PROGRESS NOTES
Subjective:     Patient ID: Parth Lund is a 57 y.o. male.    Chief Complaint: Diabetes Mellitus (DMII follow up)    HPI   This pleasant 57 y.o. Black or  male  presents for management  of their medical problems including   Patient Active Problem List   Diagnosis    Hypertension    DM (diabetes mellitus), type 2, uncontrolled, periph vascular complic    Cortical cataract - Both Eyes    HLD (hyperlipidemia)    Vasculogenic erectile dysfunction    Rt sided hand swelling from time to time- may last a week or so-since this past year.   He has had this happen in the past in his knee joints as well.  He is checking his bs about once a week in the mornings- they have been running in the 80s- usually below 100.   He denies low BS reactions  Review of Systems  per HPI  Objective:      Physical Exam   Constitutional: He is oriented to person, place, and time. He appears well-developed and well-nourished.   HENT:   Head: Normocephalic and atraumatic.   Right Ear: External ear normal.   Left Ear: External ear normal.   Nose: Nose normal.   Mouth/Throat: Oropharynx is clear and moist.   Eyes: Conjunctivae and EOM are normal. Pupils are equal, round, and reactive to light.   Neck: Normal range of motion. Neck supple. No JVD present. No thyromegaly present.   Cardiovascular: Normal rate, regular rhythm, normal heart sounds and intact distal pulses.    No murmur heard.  Pulmonary/Chest: Effort normal and breath sounds normal.   Abdominal: Soft. Bowel sounds are normal. He exhibits no mass. There is no tenderness.   Musculoskeletal: He exhibits edema (mild swelling of rt hand at 3rd MCP joint).   Lymphadenopathy:     He has no cervical adenopathy.   Neurological: He is alert and oriented to person, place, and time. He has normal reflexes.   Skin: Skin is warm and dry.   Psychiatric: He has a normal mood and affect. His behavior is normal. Judgment and thought content normal.   Vitals reviewed.       Assessment:     Parth was seen today for diabetes mellitus.    Diagnoses and all orders for this visit:      DM (diabetes mellitus), type 2, uncontrolled, periph vascular complications  Pt will try and get us report from recent eye exam    Essential hypertension  Continuing current management.    Pure hypercholesterolemia  Continuing current management.    Arthralgia, unspecified joint  -     COOKIE; Future  -     C-reactive protein; Future  -     Cyclic citrul peptide antibody, IgG; Future  -     Rheumatoid factor; Future  -     Sedimentation rate, manual; Future  -     Uric acid; Future  If this is gout we discussed options on therapy briefly    Recheck 6 months with cmp, HgA1c prior

## 2017-10-26 LAB
ANA SER QL IF: NORMAL
RHEUMATOID FACT SERPL-ACNC: <10 IU/ML

## 2017-12-20 DIAGNOSIS — E11.59 TYPE 2 DIABETES MELLITUS WITH OTHER CIRCULATORY COMPLICATION, WITHOUT LONG-TERM CURRENT USE OF INSULIN: ICD-10-CM

## 2017-12-20 RX ORDER — LOSARTAN POTASSIUM 50 MG/1
TABLET ORAL
Qty: 30 TABLET | Refills: 2 | Status: SHIPPED | OUTPATIENT
Start: 2017-12-20 | End: 2018-03-31 | Stop reason: SDUPTHER

## 2017-12-20 RX ORDER — ATORVASTATIN CALCIUM 20 MG/1
TABLET, FILM COATED ORAL
Qty: 30 TABLET | Refills: 2 | Status: SHIPPED | OUTPATIENT
Start: 2017-12-20 | End: 2018-07-27 | Stop reason: SDUPTHER

## 2017-12-20 RX ORDER — GLYBURIDE 5 MG/1
5 TABLET ORAL
Qty: 90 TABLET | Refills: 1 | Status: SHIPPED | OUTPATIENT
Start: 2017-12-20 | End: 2018-07-27 | Stop reason: SDUPTHER

## 2018-02-25 ENCOUNTER — TELEPHONE (OUTPATIENT)
Dept: FAMILY MEDICINE | Facility: CLINIC | Age: 58
End: 2018-02-25

## 2018-02-25 DIAGNOSIS — I10 ESSENTIAL HYPERTENSION: ICD-10-CM

## 2018-02-25 RX ORDER — AMLODIPINE BESYLATE 10 MG/1
TABLET ORAL
Qty: 30 TABLET | Refills: 7 | Status: SHIPPED | OUTPATIENT
Start: 2018-02-25 | End: 2018-07-27 | Stop reason: SDUPTHER

## 2018-03-01 NOTE — TELEPHONE ENCOUNTER
HE NEEDS A HGA1C EVERY 6 MONTHS SO THAT IS BY THE ENDO OF April BUT I WOULD SUGGEST HE GET  THAT NOW AND THEN SCHEDULE HIS PHYSICAL AND FUILL LABS IN APRIL

## 2018-03-12 NOTE — TELEPHONE ENCOUNTER
Detailed voicemail left for the patient advising that he is currently due for a diabetic follow up. Request that the patient call back to schedule the appointment with a lab appointment prior.

## 2018-03-25 DIAGNOSIS — E11.8 TYPE 2 DIABETES MELLITUS WITH COMPLICATION, UNSPECIFIED LONG TERM INSULIN USE STATUS: Primary | ICD-10-CM

## 2018-03-25 RX ORDER — SITAGLIPTIN AND METFORMIN HYDROCHLORIDE 1000; 50 MG/1; MG/1
TABLET, FILM COATED ORAL
Qty: 30 TABLET | Refills: 5 | OUTPATIENT
Start: 2018-03-25

## 2018-03-26 NOTE — TELEPHONE ENCOUNTER
Lab appointment scheduled for Thursday (03/29/18). Patient will scheduled his annual exam with labs prior when checking in on Thursday.

## 2018-03-29 ENCOUNTER — TELEPHONE (OUTPATIENT)
Dept: FAMILY MEDICINE | Facility: CLINIC | Age: 58
End: 2018-03-29

## 2018-03-29 ENCOUNTER — LAB VISIT (OUTPATIENT)
Dept: LAB | Facility: HOSPITAL | Age: 58
End: 2018-03-29
Attending: FAMILY MEDICINE
Payer: COMMERCIAL

## 2018-03-29 DIAGNOSIS — Z12.5 ENCOUNTER FOR PROSTATE CANCER SCREENING: ICD-10-CM

## 2018-03-29 DIAGNOSIS — Z00.00 ANNUAL PHYSICAL EXAM: Primary | ICD-10-CM

## 2018-03-29 DIAGNOSIS — E11.8 TYPE 2 DIABETES MELLITUS WITH COMPLICATION, UNSPECIFIED LONG TERM INSULIN USE STATUS: ICD-10-CM

## 2018-03-29 DIAGNOSIS — E11.51 TYPE II DIABETES MELLITUS WITH PERIPHERAL CIRCULATORY DISORDER: ICD-10-CM

## 2018-03-29 LAB
ESTIMATED AVG GLUCOSE: 120 MG/DL
HBA1C MFR BLD HPLC: 5.8 %

## 2018-03-29 PROCEDURE — 36415 COLL VENOUS BLD VENIPUNCTURE: CPT | Mod: PO

## 2018-03-29 PROCEDURE — 83036 HEMOGLOBIN GLYCOSYLATED A1C: CPT

## 2018-03-29 NOTE — TELEPHONE ENCOUNTER
----- Message from Norma Lou sent at 3/29/2018  8:31 AM CDT -----  Contact: self  Doctor appointment and lab have been scheduled.  Please link lab orders to the lab appointment.  Date of doctor appointment:  7/27/2018  Physical or EP:  Physical   Date of lab appointment:  7/20/2018  Comments:

## 2018-04-01 RX ORDER — LOSARTAN POTASSIUM 50 MG/1
TABLET ORAL
Qty: 30 TABLET | Refills: 2 | Status: SHIPPED | OUTPATIENT
Start: 2018-04-01 | End: 2018-06-26 | Stop reason: SDUPTHER

## 2018-06-26 RX ORDER — LOSARTAN POTASSIUM 50 MG/1
TABLET ORAL
Qty: 30 TABLET | Refills: 5 | Status: SHIPPED | OUTPATIENT
Start: 2018-06-26 | End: 2018-07-27 | Stop reason: SDUPTHER

## 2018-07-20 ENCOUNTER — LAB VISIT (OUTPATIENT)
Dept: LAB | Facility: HOSPITAL | Age: 58
End: 2018-07-20
Attending: FAMILY MEDICINE
Payer: COMMERCIAL

## 2018-07-20 DIAGNOSIS — Z00.00 ANNUAL PHYSICAL EXAM: ICD-10-CM

## 2018-07-20 DIAGNOSIS — Z12.5 ENCOUNTER FOR PROSTATE CANCER SCREENING: ICD-10-CM

## 2018-07-20 LAB
ALBUMIN SERPL BCP-MCNC: 3.6 G/DL
ALP SERPL-CCNC: 92 U/L
ALT SERPL W/O P-5'-P-CCNC: 15 U/L
ANION GAP SERPL CALC-SCNC: 9 MMOL/L
AST SERPL-CCNC: 15 U/L
BASOPHILS # BLD AUTO: 0.03 K/UL
BASOPHILS NFR BLD: 0.8 %
BILIRUB SERPL-MCNC: 0.4 MG/DL
BUN SERPL-MCNC: 11 MG/DL
CALCIUM SERPL-MCNC: 9.3 MG/DL
CHLORIDE SERPL-SCNC: 105 MMOL/L
CHOLEST SERPL-MCNC: 129 MG/DL
CHOLEST/HDLC SERPL: 3 {RATIO}
CO2 SERPL-SCNC: 26 MMOL/L
COMPLEXED PSA SERPL-MCNC: 1.7 NG/ML
CREAT SERPL-MCNC: 1 MG/DL
DIFFERENTIAL METHOD: ABNORMAL
EOSINOPHIL # BLD AUTO: 0.1 K/UL
EOSINOPHIL NFR BLD: 3.7 %
ERYTHROCYTE [DISTWIDTH] IN BLOOD BY AUTOMATED COUNT: 12.9 %
EST. GFR  (AFRICAN AMERICAN): >60 ML/MIN/1.73 M^2
EST. GFR  (NON AFRICAN AMERICAN): >60 ML/MIN/1.73 M^2
GLUCOSE SERPL-MCNC: 82 MG/DL
HCT VFR BLD AUTO: 41.7 %
HDLC SERPL-MCNC: 43 MG/DL
HDLC SERPL: 33.3 %
HGB BLD-MCNC: 13 G/DL
IMM GRANULOCYTES # BLD AUTO: 0 K/UL
IMM GRANULOCYTES NFR BLD AUTO: 0 %
LDLC SERPL CALC-MCNC: 75.8 MG/DL
LYMPHOCYTES # BLD AUTO: 0.9 K/UL
LYMPHOCYTES NFR BLD: 23.8 %
MCH RBC QN AUTO: 27.8 PG
MCHC RBC AUTO-ENTMCNC: 31.2 G/DL
MCV RBC AUTO: 89 FL
MONOCYTES # BLD AUTO: 0.7 K/UL
MONOCYTES NFR BLD: 17.5 %
NEUTROPHILS # BLD AUTO: 2.1 K/UL
NEUTROPHILS NFR BLD: 54.2 %
NONHDLC SERPL-MCNC: 86 MG/DL
NRBC BLD-RTO: 0 /100 WBC
PLATELET # BLD AUTO: 247 K/UL
PMV BLD AUTO: 11 FL
POTASSIUM SERPL-SCNC: 3.9 MMOL/L
PROT SERPL-MCNC: 7 G/DL
RBC # BLD AUTO: 4.68 M/UL
SODIUM SERPL-SCNC: 140 MMOL/L
T4 FREE SERPL-MCNC: 0.78 NG/DL
TRIGL SERPL-MCNC: 51 MG/DL
TSH SERPL DL<=0.005 MIU/L-ACNC: 0.08 UIU/ML
WBC # BLD AUTO: 3.83 K/UL

## 2018-07-20 PROCEDURE — 84439 ASSAY OF FREE THYROXINE: CPT

## 2018-07-20 PROCEDURE — 80061 LIPID PANEL: CPT

## 2018-07-20 PROCEDURE — 85025 COMPLETE CBC W/AUTO DIFF WBC: CPT

## 2018-07-20 PROCEDURE — 84153 ASSAY OF PSA TOTAL: CPT

## 2018-07-20 PROCEDURE — 84443 ASSAY THYROID STIM HORMONE: CPT

## 2018-07-20 PROCEDURE — 36415 COLL VENOUS BLD VENIPUNCTURE: CPT | Mod: PO

## 2018-07-20 PROCEDURE — 80053 COMPREHEN METABOLIC PANEL: CPT

## 2018-07-27 ENCOUNTER — OFFICE VISIT (OUTPATIENT)
Dept: FAMILY MEDICINE | Facility: CLINIC | Age: 58
End: 2018-07-27
Payer: COMMERCIAL

## 2018-07-27 ENCOUNTER — LAB VISIT (OUTPATIENT)
Dept: LAB | Facility: HOSPITAL | Age: 58
End: 2018-07-27
Attending: FAMILY MEDICINE
Payer: COMMERCIAL

## 2018-07-27 VITALS
BODY MASS INDEX: 25.5 KG/M2 | WEIGHT: 172.19 LBS | HEART RATE: 72 BPM | SYSTOLIC BLOOD PRESSURE: 120 MMHG | DIASTOLIC BLOOD PRESSURE: 68 MMHG | TEMPERATURE: 98 F | HEIGHT: 69 IN

## 2018-07-27 DIAGNOSIS — E11.59 TYPE 2 DIABETES MELLITUS WITH OTHER CIRCULATORY COMPLICATION, WITHOUT LONG-TERM CURRENT USE OF INSULIN: ICD-10-CM

## 2018-07-27 DIAGNOSIS — N52.9 VASCULOGENIC ERECTILE DYSFUNCTION, UNSPECIFIED VASCULOGENIC ERECTILE DYSFUNCTION TYPE: ICD-10-CM

## 2018-07-27 DIAGNOSIS — E05.90 SUBCLINICAL HYPERTHYROIDISM: ICD-10-CM

## 2018-07-27 DIAGNOSIS — Z00.00 ROUTINE GENERAL MEDICAL EXAMINATION AT A HEALTH CARE FACILITY: Primary | ICD-10-CM

## 2018-07-27 DIAGNOSIS — I10 ESSENTIAL HYPERTENSION: ICD-10-CM

## 2018-07-27 DIAGNOSIS — E78.00 PURE HYPERCHOLESTEROLEMIA: ICD-10-CM

## 2018-07-27 DIAGNOSIS — H26.9 CORTICAL CATARACT: ICD-10-CM

## 2018-07-27 LAB
ALBUMIN/CREAT UR: 3.5 UG/MG
CREAT UR-MCNC: 285 MG/DL
ESTIMATED AVG GLUCOSE: 117 MG/DL
HBA1C MFR BLD HPLC: 5.7 %
MICROALBUMIN UR DL<=1MG/L-MCNC: 10 UG/ML
TSH SERPL DL<=0.005 MIU/L-ACNC: 0.52 UIU/ML

## 2018-07-27 PROCEDURE — 99396 PREV VISIT EST AGE 40-64: CPT | Mod: S$GLB,,, | Performed by: FAMILY MEDICINE

## 2018-07-27 PROCEDURE — 36415 COLL VENOUS BLD VENIPUNCTURE: CPT | Mod: PO

## 2018-07-27 PROCEDURE — 82043 UR ALBUMIN QUANTITATIVE: CPT

## 2018-07-27 PROCEDURE — 99999 PR PBB SHADOW E&M-EST. PATIENT-LVL III: CPT | Mod: PBBFAC,,, | Performed by: FAMILY MEDICINE

## 2018-07-27 PROCEDURE — 84443 ASSAY THYROID STIM HORMONE: CPT

## 2018-07-27 PROCEDURE — 83036 HEMOGLOBIN GLYCOSYLATED A1C: CPT

## 2018-07-27 RX ORDER — LOSARTAN POTASSIUM 50 MG/1
50 TABLET ORAL DAILY
Qty: 90 TABLET | Refills: 2 | Status: SHIPPED | OUTPATIENT
Start: 2018-07-27 | End: 2019-07-19 | Stop reason: SDUPTHER

## 2018-07-27 RX ORDER — AMLODIPINE BESYLATE 10 MG/1
10 TABLET ORAL NIGHTLY
Qty: 90 TABLET | Refills: 2 | Status: SHIPPED | OUTPATIENT
Start: 2018-07-27 | End: 2019-07-19 | Stop reason: SDUPTHER

## 2018-07-27 RX ORDER — GLYBURIDE 5 MG/1
5 TABLET ORAL
Qty: 90 TABLET | Refills: 2 | Status: SHIPPED | OUTPATIENT
Start: 2018-07-27 | End: 2019-05-08 | Stop reason: SDUPTHER

## 2018-07-27 RX ORDER — SILDENAFIL 100 MG/1
100 TABLET, FILM COATED ORAL DAILY PRN
Qty: 5 TABLET | Refills: 5 | Status: SHIPPED | OUTPATIENT
Start: 2018-07-27 | End: 2019-07-19 | Stop reason: SDUPTHER

## 2018-07-27 RX ORDER — ATORVASTATIN CALCIUM 20 MG/1
20 TABLET, FILM COATED ORAL DAILY
Qty: 90 TABLET | Refills: 2 | Status: SHIPPED | OUTPATIENT
Start: 2018-07-27 | End: 2019-05-08 | Stop reason: SDUPTHER

## 2018-07-27 NOTE — PROGRESS NOTES
Subjective:     Patient ID: Parth Lund is a 58 y.o. male.    Chief Complaint: Annual Exam  He is checking his blood sugars about once a week and they have been good- running about 80 or so.   HPI  Review of Systems   Constitutional: Negative.  Negative for appetite change, diaphoresis and fatigue.   HENT: Negative.  Negative for congestion, hearing loss and sneezing.    Eyes: Negative for pain, discharge and visual disturbance.   Respiratory: Negative for apnea, choking, shortness of breath and wheezing.    Cardiovascular: Negative for chest pain and leg swelling.   Gastrointestinal: Negative for abdominal distention, blood in stool, nausea and vomiting.   Genitourinary: Negative for difficulty urinating, discharge, flank pain, testicular pain and urgency.   Musculoskeletal: Negative for arthralgias, joint swelling, myalgias, neck pain and neck stiffness.   Skin: Negative.  Negative for color change and rash.   Neurological: Negative.  Negative for dizziness, syncope, weakness and light-headedness.   Hematological: Negative for adenopathy. Does not bruise/bleed easily.   Psychiatric/Behavioral: Negative for agitation, confusion, dysphoric mood and hallucinations.       Objective:      Physical Exam   Constitutional: He is oriented to person, place, and time. He appears well-developed and well-nourished.   HENT:   Head: Normocephalic and atraumatic.   Right Ear: External ear normal.   Left Ear: External ear normal.   Nose: Nose normal.   Mouth/Throat: Oropharynx is clear and moist.   Eyes: Conjunctivae and EOM are normal. Pupils are equal, round, and reactive to light.   Neck: Normal range of motion. Neck supple. No JVD present. No thyromegaly present.   Cardiovascular: Normal rate, regular rhythm, normal heart sounds and intact distal pulses.    No murmur heard.  Pulses:       Dorsalis pedis pulses are 2+ on the right side, and 2+ on the left side.        Posterior tibial pulses are 2+ on the right side, and 2+  on the left side.   Pulmonary/Chest: Effort normal and breath sounds normal.   Abdominal: Soft. Bowel sounds are normal. He exhibits no mass. There is no tenderness.   Musculoskeletal: Normal range of motion. He exhibits no edema or tenderness.        Right foot: There is normal range of motion and no deformity.        Left foot: There is normal range of motion and no deformity.   Feet:   Right Foot:   Protective Sensation: 10 sites tested. 10 sites sensed.   Skin Integrity: Negative for skin breakdown or erythema.   Left Foot:   Protective Sensation: 10 sites tested. 10 sites sensed.   Skin Integrity: Negative for skin breakdown or erythema.   Lymphadenopathy:     He has no cervical adenopathy.   Neurological: He is alert and oriented to person, place, and time. He has normal reflexes.   Skin: Skin is warm and dry.   Psychiatric: He has a normal mood and affect. His behavior is normal. Judgment and thought content normal.   Vitals reviewed.      Assessment:     Parth was seen today for annual exam.    Diagnoses and all orders for this visit:    Routine general medical examination at a health care facility    DM (diabetes mellitus), type 2, uncontrolled, periph vascular complic  -     Ambulatory referral to Optometry  -     Hemoglobin A1c; Future  -     Microalbumin/creatinine urine ratio  -     glyBURIDE (DIABETA) 5 MG tablet; Take 1 tablet (5 mg total) by mouth daily with breakfast.  -     SITagliptan-metformin (JANUMET) 50-1,000 mg per tablet; Take 1 tablet by mouth once daily.    Vasculogenic erectile dysfunction, unspecified vasculogenic erectile dysfunction type  -     sildenafil (VIAGRA) 100 MG tablet; Take 1 tablet (100 mg total) by mouth daily as needed for Erectile Dysfunction.    Pure hypercholesterolemia  -     atorvastatin (LIPITOR) 20 MG tablet; Take 1 tablet (20 mg total) by mouth once daily.    Essential hypertension  -     amLODIPine (NORVASC) 10 MG tablet; Take 1 tablet (10 mg total) by mouth every  evening.  -     losartan (COZAAR) 50 MG tablet; Take 1 tablet (50 mg total) by mouth once daily.    Cortical cataract - Both Eyes    Subclinical hyperthyroidism  -     TSH; Future    Type 2 diabetes mellitus with other circulatory complication, without long-term current use of insulin  Comments:  Cont Metformin, add GLyburide f/u 3 months    Orders:  -     glyBURIDE (DIABETA) 5 MG tablet; Take 1 tablet (5 mg total) by mouth daily with breakfast.    recheck in 6 months with HgA1c prior

## 2018-08-10 ENCOUNTER — OFFICE VISIT (OUTPATIENT)
Dept: OPTOMETRY | Facility: CLINIC | Age: 58
End: 2018-08-10
Payer: COMMERCIAL

## 2018-08-10 DIAGNOSIS — H25.13 NUCLEAR SCLEROSIS, BILATERAL: ICD-10-CM

## 2018-08-10 DIAGNOSIS — E11.9 TYPE 2 DIABETES MELLITUS WITHOUT RETINOPATHY: Primary | ICD-10-CM

## 2018-08-10 PROCEDURE — 99999 PR PBB SHADOW E&M-EST. PATIENT-LVL II: CPT | Mod: PBBFAC,,, | Performed by: OPTOMETRIST

## 2018-08-10 PROCEDURE — 92014 COMPRE OPH EXAM EST PT 1/>: CPT | Mod: S$GLB,,, | Performed by: OPTOMETRIST

## 2018-08-10 NOTE — PROGRESS NOTES
HPI     Ref. By Tara Pickett   Recently dx with diabetes  Last eye exam 3-4 months ago [facility]  Wears glasses for reading only does not use them for driving  No flashes or floaters  No itching burning or tearing  No diplopia    Last edited by Polina Mclaughlin on 8/10/2018  8:39 AM. (History)            Assessment /Plan     For exam results, see Encounter Report.    Type 2 diabetes mellitus without retinopathy    Nuclear sclerosis, bilateral      1. [place] OU--did not refract today because had outside eye exam w new spex a few months ago, and he is happy with vision  2. DM- WITHOUT RETINOPATHY.  Advised yearly DFE    PLAN:    rtc 1 yr

## 2018-08-10 NOTE — LETTER
August 10, 2018      Tara Pickett, DO  [address] [facility]  [address]  [facility] [address]           [place] [facility]  [address] [facility] [place] [address]  Phone: [phone]  Fax: [phone]          Patient: Parth Lund   MR Number: [id number]   YOB: 1960   Date of Visit: 8/10/2018       Dear Dr. Tara Pickett:    Thank you for referring Parth Lund to me for evaluation. Attached you will find relevant portions of my assessment and plan of care.    If you have questions, please do not hesitate to call me. I look forward to following Parth Lund along with you.    Sincerely,    Rodriguez Villarreal, OD    Enclosure  CC:  No Recipients    If you would like to receive this communication electronically, please contact [email or web address] or [phone] to request more information on [employer] Link access.    For providers and/or their staff who would like to refer a patient to [facility], please contact us through our one-stop-shop provider referral line, [facility], at [phone].    If you feel you have received this communication in error or would no longer like to receive these types of communications, please e-mail [email or web address]

## 2019-05-08 DIAGNOSIS — E78.00 PURE HYPERCHOLESTEROLEMIA: ICD-10-CM

## 2019-05-08 DIAGNOSIS — E11.59 TYPE 2 DIABETES MELLITUS WITH OTHER CIRCULATORY COMPLICATION, WITHOUT LONG-TERM CURRENT USE OF INSULIN: ICD-10-CM

## 2019-05-08 RX ORDER — ATORVASTATIN CALCIUM 20 MG/1
TABLET, FILM COATED ORAL
Qty: 90 TABLET | Refills: 0 | Status: SHIPPED | OUTPATIENT
Start: 2019-05-08 | End: 2019-07-28 | Stop reason: SDUPTHER

## 2019-05-08 RX ORDER — GLYBURIDE 5 MG/1
5 TABLET ORAL
Qty: 90 TABLET | Refills: 0 | Status: SHIPPED | OUTPATIENT
Start: 2019-05-08 | End: 2019-07-28 | Stop reason: SDUPTHER

## 2019-05-08 NOTE — TELEPHONE ENCOUNTER
Please notify pt he is overdue for labs and a visit and he should have been informed that I left [place] and he needs to establish with a new PCP. He needs to call now for this as he is already overdue. This is the last time I can refill his meds

## 2019-07-19 ENCOUNTER — OFFICE VISIT (OUTPATIENT)
Dept: PRIMARY CARE CLINIC | Facility: CLINIC | Age: 59
End: 2019-07-19
Payer: COMMERCIAL

## 2019-07-19 VITALS
HEIGHT: 69 IN | DIASTOLIC BLOOD PRESSURE: 87 MMHG | BODY MASS INDEX: 26.02 KG/M2 | WEIGHT: 175.69 LBS | HEART RATE: 70 BPM | TEMPERATURE: 98 F | SYSTOLIC BLOOD PRESSURE: 156 MMHG

## 2019-07-19 DIAGNOSIS — Z12.5 PROSTATE CANCER SCREENING: ICD-10-CM

## 2019-07-19 DIAGNOSIS — Z00.00 ROUTINE MEDICAL EXAM: Primary | ICD-10-CM

## 2019-07-19 DIAGNOSIS — I49.9 IRREGULAR HEART BEAT: ICD-10-CM

## 2019-07-19 DIAGNOSIS — E11.9 TYPE 2 DIABETES MELLITUS WITHOUT COMPLICATION, WITHOUT LONG-TERM CURRENT USE OF INSULIN: ICD-10-CM

## 2019-07-19 DIAGNOSIS — H61.22 HEARING LOSS DUE TO CERUMEN IMPACTION, LEFT: ICD-10-CM

## 2019-07-19 DIAGNOSIS — Z23 NEED FOR SHINGLES VACCINE: ICD-10-CM

## 2019-07-19 DIAGNOSIS — I10 ESSENTIAL HYPERTENSION: ICD-10-CM

## 2019-07-19 DIAGNOSIS — N52.9 ERECTILE DYSFUNCTION, UNSPECIFIED ERECTILE DYSFUNCTION TYPE: ICD-10-CM

## 2019-07-19 LAB
ALBUMIN/CREAT UR: 4 UG/MG (ref 0–30)
BILIRUB UR QL STRIP: NEGATIVE
CAOX CRY UR QL COMP ASSIST: NORMAL
CLARITY UR REFRACT.AUTO: ABNORMAL
COLOR UR AUTO: YELLOW
CREAT UR-MCNC: 225 MG/DL (ref 23–375)
GLUCOSE UR QL STRIP: NEGATIVE
HGB UR QL STRIP: NEGATIVE
KETONES UR QL STRIP: NEGATIVE
LEUKOCYTE ESTERASE UR QL STRIP: NEGATIVE
MICROALBUMIN UR DL<=1MG/L-MCNC: 9 UG/ML
MICROSCOPIC COMMENT: NORMAL
NITRITE UR QL STRIP: NEGATIVE
PH UR STRIP: 5 [PH] (ref 5–8)
PROT UR QL STRIP: NEGATIVE
RBC #/AREA URNS AUTO: 2 /HPF (ref 0–4)
SP GR UR STRIP: 1.02 (ref 1–1.03)
SQUAMOUS #/AREA URNS AUTO: 0 /HPF
URATE CRY UR QL COMP ASSIST: NORMAL
URN SPEC COLLECT METH UR: ABNORMAL
WBC #/AREA URNS AUTO: 2 /HPF (ref 0–5)

## 2019-07-19 PROCEDURE — 93010 ELECTROCARDIOGRAM REPORT: CPT | Mod: S$GLB,,, | Performed by: INTERNAL MEDICINE

## 2019-07-19 PROCEDURE — 99999 PR PBB SHADOW E&M-EST. PATIENT-LVL IV: CPT | Mod: PBBFAC,,, | Performed by: INTERNAL MEDICINE

## 2019-07-19 PROCEDURE — 81001 URINALYSIS AUTO W/SCOPE: CPT

## 2019-07-19 PROCEDURE — 99396 PREV VISIT EST AGE 40-64: CPT | Mod: S$GLB,,, | Performed by: INTERNAL MEDICINE

## 2019-07-19 PROCEDURE — 93005 EKG 12-LEAD: ICD-10-PCS | Mod: S$GLB,,, | Performed by: INTERNAL MEDICINE

## 2019-07-19 PROCEDURE — 93010 EKG 12-LEAD: ICD-10-PCS | Mod: S$GLB,,, | Performed by: INTERNAL MEDICINE

## 2019-07-19 PROCEDURE — 99396 PR PREVENTIVE VISIT,EST,40-64: ICD-10-PCS | Mod: S$GLB,,, | Performed by: INTERNAL MEDICINE

## 2019-07-19 PROCEDURE — 93005 ELECTROCARDIOGRAM TRACING: CPT | Mod: S$GLB,,, | Performed by: INTERNAL MEDICINE

## 2019-07-19 PROCEDURE — 99999 PR PBB SHADOW E&M-EST. PATIENT-LVL IV: ICD-10-PCS | Mod: PBBFAC,,, | Performed by: INTERNAL MEDICINE

## 2019-07-19 PROCEDURE — 82043 UR ALBUMIN QUANTITATIVE: CPT

## 2019-07-19 RX ORDER — AMLODIPINE BESYLATE 10 MG/1
10 TABLET ORAL NIGHTLY
Qty: 90 TABLET | Refills: 1 | Status: SHIPPED | OUTPATIENT
Start: 2019-07-19 | End: 2019-11-22 | Stop reason: SDUPTHER

## 2019-07-19 RX ORDER — LOSARTAN POTASSIUM 50 MG/1
50 TABLET ORAL DAILY
Qty: 90 TABLET | Refills: 1 | Status: SHIPPED | OUTPATIENT
Start: 2019-07-19 | End: 2019-11-22 | Stop reason: SDUPTHER

## 2019-07-19 RX ORDER — SILDENAFIL 100 MG/1
100 TABLET, FILM COATED ORAL DAILY PRN
Qty: 6 TABLET | Refills: 5 | Status: SHIPPED | OUTPATIENT
Start: 2019-07-19

## 2019-07-20 NOTE — PROGRESS NOTES
Subjective:       Patient ID: Parth Lund is a 59 y.o. male.    Chief Complaint: Annual Exam    Last seen by previous PCP one year ago. Presents for transfer of care and annual exam. No complaints, feeling well. Glucose ranges 80-90's fasting per history. No home BP monitoring. He has run out of some medications, no Amlodipine or Janumet in a week.     PMH:   Hypertension.   Diabetes type 2, HbA1c 5.7% July '18.   Hyperlipidemia, TChol 129, TG 51, HDL 43, LDL 76 July '18.  Erectile Dysfunction.    PSH: None.     Colonoscopy normal 4/14. Eye exam 8/18. PSA 1.7 July '18, TSH 0.521, CBC and CMP normal. Flu shot 2017. Tdap 2014. Pneumovax 2014. Prevnar 2015.     Social: Non-smoker, social alcohol. , 4 children.  for [employer].     FMH: HTN, DM, Chol, Stroke, Heart dis, Throat cancer.     NKDA.     Medications: list reviewed and reconciled.     Review of Systems   Constitutional: Negative for activity change, appetite change, chills, fatigue, fever and unexpected weight change.   HENT: Negative for congestion, ear pain, postnasal drip, rhinorrhea, sore throat, trouble swallowing and voice change.         Left ear feels stopped up with intermittent hearing impairment.   Eyes: Negative for pain and visual disturbance.   Respiratory: Negative for apnea, cough, chest tightness, shortness of breath and wheezing.    Cardiovascular: Negative for chest pain, palpitations and leg swelling.   Gastrointestinal: Negative for abdominal pain, blood in stool, constipation, diarrhea, nausea and vomiting.   Genitourinary: Negative for difficulty urinating, dysuria, frequency, hematuria, scrotal swelling and urgency.        ED.   Musculoskeletal: Negative for back pain, gait problem, myalgias, neck pain and neck stiffness.        Mild left knee pain when kneeling, no swelling, no meds tried.    Skin: Negative for color change and rash.   Neurological: Negative for dizziness, seizures, syncope, weakness,  "numbness and headaches.   Hematological: Negative for adenopathy. Does not bruise/bleed easily.   Psychiatric/Behavioral: Negative for agitation, dysphoric mood and sleep disturbance. The patient is not nervous/anxious.        Objective:    /94, repeat 156/87, Pulse 98, Ht 5' 8.5", Wt 175.7 lbs (stable), BMI=26  Physical Exam   Constitutional: He is oriented to person, place, and time. He appears well-developed and well-nourished. No distress.   HENT:   Head: Normocephalic and atraumatic.   Right Ear: External ear normal.   Nose: Nose normal.   Mouth/Throat: Oropharynx is clear and moist.   Cerumen impaction on left side only.   Eyes: Pupils are equal, round, and reactive to light. Conjunctivae and EOM are normal. No scleral icterus.   Neck: Normal range of motion. Neck supple. No JVD present. Carotid bruit is not present. No thyromegaly present.   Cardiovascular: Normal rate, normal heart sounds and intact distal pulses. Exam reveals no gallop and no friction rub.   No murmur heard.  Frequent ectopy.    Pulmonary/Chest: Effort normal and breath sounds normal. No respiratory distress. He has no wheezes. He has no rales.   Abdominal: Soft. Bowel sounds are normal. He exhibits no distension and no mass. There is no tenderness.   Musculoskeletal: Normal range of motion. He exhibits no edema, tenderness or deformity.   Lymphadenopathy:     He has no cervical adenopathy.   Neurological: He is alert and oriented to person, place, and time. He has normal reflexes. No cranial nerve deficit. He exhibits normal muscle tone. Coordination normal.   Skin: Skin is warm and dry. No rash noted. He is not diaphoretic.   Psychiatric: He has a normal mood and affect. His behavior is normal. Judgment and thought content normal.       Assessment:       1. Routine medical exam    2. Irregular heart beat    3. Type 2 diabetes mellitus without complication, without long-term current use of insulin    4. Essential hypertension    5. " Erectile dysfunction, unspecified erectile dysfunction type    6. Hearing loss due to cerumen impaction, left    7. Prostate cancer screening    8. Need for shingles vaccine        Plan:       Routine medical exam  -     CBC auto differential; Future; Expected date: 07/19/2019  -     Comprehensive metabolic panel; Future; Expected date: 07/19/2019  -     Lipid panel; Future; Expected date: 07/19/2019  -     Urinalysis    Irregular heart beat  -     IN OFFICE EKG 12-LEAD (to Muse)    Type 2 diabetes mellitus without complication, without long-term current use of insulin  -     Hemoglobin A1c; Future; Expected date: 07/19/2019  -     Microalbumin/creatinine urine ratio    Essential hypertension  -     Continue Losartan (COZAAR) 50 MG tablet; Take 1 tablet (50 mg total) by mouth once daily.  Dispense: 90 tablet; Refill: 1  -     Resume AmLODIPine (NORVASC) 10 MG tablet; Take 1 tablet (10 mg total) by mouth every evening.  Dispense: 90 tablet; Refill: 1    Erectile dysfunction, unspecified erectile dysfunction type  -     sildenafil (VIAGRA) 100 MG tablet; Take 1 tablet (100 mg total) by mouth daily as needed for Erectile Dysfunction.  Dispense: 6 tablet; Refill: 5    Hearing loss due to cerumen impaction, left        -     Ambulatory referral to ENT.     Prostate cancer screening  -     PSA, Screening; Future; Expected date: 07/19/2019    Need for shingles vaccine - Shingrix from the pharmacy.

## 2019-07-22 ENCOUNTER — LAB VISIT (OUTPATIENT)
Dept: LAB | Facility: HOSPITAL | Age: 59
End: 2019-07-22
Attending: INTERNAL MEDICINE
Payer: COMMERCIAL

## 2019-07-22 DIAGNOSIS — E11.9 TYPE 2 DIABETES MELLITUS WITHOUT COMPLICATION, WITHOUT LONG-TERM CURRENT USE OF INSULIN: ICD-10-CM

## 2019-07-22 DIAGNOSIS — Z12.5 PROSTATE CANCER SCREENING: ICD-10-CM

## 2019-07-22 DIAGNOSIS — Z00.00 ROUTINE MEDICAL EXAM: ICD-10-CM

## 2019-07-22 LAB
ALBUMIN SERPL BCP-MCNC: 3.6 G/DL (ref 3.5–5.2)
ALP SERPL-CCNC: 125 U/L (ref 55–135)
ALT SERPL W/O P-5'-P-CCNC: 21 U/L (ref 10–44)
ANION GAP SERPL CALC-SCNC: 7 MMOL/L (ref 8–16)
AST SERPL-CCNC: 15 U/L (ref 10–40)
BASOPHILS # BLD AUTO: 0.04 K/UL (ref 0–0.2)
BASOPHILS NFR BLD: 0.9 % (ref 0–1.9)
BILIRUB SERPL-MCNC: 0.5 MG/DL (ref 0.1–1)
BUN SERPL-MCNC: 16 MG/DL (ref 6–20)
CALCIUM SERPL-MCNC: 9.5 MG/DL (ref 8.7–10.5)
CHLORIDE SERPL-SCNC: 102 MMOL/L (ref 95–110)
CHOLEST SERPL-MCNC: 119 MG/DL (ref 120–199)
CHOLEST/HDLC SERPL: 2.6 {RATIO} (ref 2–5)
CO2 SERPL-SCNC: 29 MMOL/L (ref 23–29)
COMPLEXED PSA SERPL-MCNC: 1.7 NG/ML (ref 0–4)
CREAT SERPL-MCNC: 1.2 MG/DL (ref 0.5–1.4)
DIFFERENTIAL METHOD: ABNORMAL
EOSINOPHIL # BLD AUTO: 0.2 K/UL (ref 0–0.5)
EOSINOPHIL NFR BLD: 4.8 % (ref 0–8)
ERYTHROCYTE [DISTWIDTH] IN BLOOD BY AUTOMATED COUNT: 12.8 % (ref 11.5–14.5)
EST. GFR  (AFRICAN AMERICAN): >60 ML/MIN/1.73 M^2
EST. GFR  (NON AFRICAN AMERICAN): >60 ML/MIN/1.73 M^2
ESTIMATED AVG GLUCOSE: 154 MG/DL (ref 68–131)
GLUCOSE SERPL-MCNC: 148 MG/DL (ref 70–110)
HBA1C MFR BLD HPLC: 7 % (ref 4–5.6)
HCT VFR BLD AUTO: 43.1 % (ref 40–54)
HDLC SERPL-MCNC: 46 MG/DL (ref 40–75)
HDLC SERPL: 38.7 % (ref 20–50)
HGB BLD-MCNC: 13.3 G/DL (ref 14–18)
IMM GRANULOCYTES # BLD AUTO: 0.01 K/UL (ref 0–0.04)
IMM GRANULOCYTES NFR BLD AUTO: 0.2 % (ref 0–0.5)
LDLC SERPL CALC-MCNC: 64.2 MG/DL (ref 63–159)
LYMPHOCYTES # BLD AUTO: 1.3 K/UL (ref 1–4.8)
LYMPHOCYTES NFR BLD: 27.6 % (ref 18–48)
MCH RBC QN AUTO: 27.5 PG (ref 27–31)
MCHC RBC AUTO-ENTMCNC: 30.9 G/DL (ref 32–36)
MCV RBC AUTO: 89 FL (ref 82–98)
MONOCYTES # BLD AUTO: 0.6 K/UL (ref 0.3–1)
MONOCYTES NFR BLD: 13.7 % (ref 4–15)
NEUTROPHILS # BLD AUTO: 2.4 K/UL (ref 1.8–7.7)
NEUTROPHILS NFR BLD: 52.8 % (ref 38–73)
NONHDLC SERPL-MCNC: 73 MG/DL
NRBC BLD-RTO: 0 /100 WBC
PLATELET # BLD AUTO: 258 K/UL (ref 150–350)
PMV BLD AUTO: 11.2 FL (ref 9.2–12.9)
POTASSIUM SERPL-SCNC: 3.8 MMOL/L (ref 3.5–5.1)
PROT SERPL-MCNC: 7 G/DL (ref 6–8.4)
RBC # BLD AUTO: 4.83 M/UL (ref 4.6–6.2)
SODIUM SERPL-SCNC: 138 MMOL/L (ref 136–145)
TRIGL SERPL-MCNC: 44 MG/DL (ref 30–150)
WBC # BLD AUTO: 4.6 K/UL (ref 3.9–12.7)

## 2019-07-22 PROCEDURE — 83036 HEMOGLOBIN GLYCOSYLATED A1C: CPT

## 2019-07-22 PROCEDURE — 80061 LIPID PANEL: CPT

## 2019-07-22 PROCEDURE — 85025 COMPLETE CBC W/AUTO DIFF WBC: CPT

## 2019-07-22 PROCEDURE — 80053 COMPREHEN METABOLIC PANEL: CPT

## 2019-07-22 PROCEDURE — 84153 ASSAY OF PSA TOTAL: CPT

## 2019-07-22 PROCEDURE — 36415 COLL VENOUS BLD VENIPUNCTURE: CPT | Mod: PN

## 2019-07-28 ENCOUNTER — TELEPHONE (OUTPATIENT)
Dept: PRIMARY CARE CLINIC | Facility: CLINIC | Age: 59
End: 2019-07-28

## 2019-07-28 DIAGNOSIS — E11.9 TYPE 2 DIABETES MELLITUS WITHOUT COMPLICATION, WITHOUT LONG-TERM CURRENT USE OF INSULIN: Primary | ICD-10-CM

## 2019-07-28 DIAGNOSIS — E78.5 HYPERLIPIDEMIA, UNSPECIFIED HYPERLIPIDEMIA TYPE: ICD-10-CM

## 2019-07-28 DIAGNOSIS — E78.00 PURE HYPERCHOLESTEROLEMIA: ICD-10-CM

## 2019-07-28 DIAGNOSIS — E11.59 TYPE 2 DIABETES MELLITUS WITH OTHER CIRCULATORY COMPLICATION, WITHOUT LONG-TERM CURRENT USE OF INSULIN: ICD-10-CM

## 2019-07-28 RX ORDER — ATORVASTATIN CALCIUM 20 MG/1
20 TABLET, FILM COATED ORAL DAILY
Qty: 90 TABLET | Refills: 2 | Status: SHIPPED | OUTPATIENT
Start: 2019-07-28 | End: 2020-05-13

## 2019-07-28 RX ORDER — GLYBURIDE 5 MG/1
5 TABLET ORAL
Qty: 90 TABLET | Refills: 2 | Status: SHIPPED | OUTPATIENT
Start: 2019-07-28 | End: 2020-05-13

## 2019-07-28 NOTE — TELEPHONE ENCOUNTER
Labs are stable except blood sugar running high - hemoglobin A1c is 7.0 which is an average blood sugar of 155. Increase Janumet from one a day to one tab twice a day - refills ordered at [facility]. Keep other meds the same. Schedule repeat HbA1c in 3 months, no fasting required.

## 2019-07-29 NOTE — TELEPHONE ENCOUNTER
Left VM advising pt to call back so that  I may advise per provider that;Labs are stable except blood sugar running high - hemoglobin A1c is 7.0 which is an average blood sugar of 155. Increase Janumet from one a day to one tab twice a day - refills ordered at [facility]. Keep other meds the same. Schedule repeat HbA1c in 3 months, no fasting required.

## 2019-08-06 ENCOUNTER — CLINICAL SUPPORT (OUTPATIENT)
Dept: PRIMARY CARE CLINIC | Facility: CLINIC | Age: 59
End: 2019-08-06
Payer: COMMERCIAL

## 2019-08-06 VITALS — SYSTOLIC BLOOD PRESSURE: 148 MMHG | HEART RATE: 78 BPM | DIASTOLIC BLOOD PRESSURE: 80 MMHG

## 2019-08-06 DIAGNOSIS — I10 ESSENTIAL HYPERTENSION: Primary | ICD-10-CM

## 2019-08-06 PROCEDURE — 99999 PR PBB SHADOW E&M-EST. PATIENT-LVL I: ICD-10-PCS | Mod: PBBFAC,,,

## 2019-08-06 PROCEDURE — 99999 PR PBB SHADOW E&M-EST. PATIENT-LVL I: CPT | Mod: PBBFAC,,,

## 2019-08-06 NOTE — PROGRESS NOTES
Two patient identifiers used.  Pt seen today for b/p check.  Pt reports taking Amlodipine 10 mg and Losartan 50 mg this morning at 8:00 am.  Today's readings b/p 148/80 pulse 78.  Provider advised.

## 2019-08-14 ENCOUNTER — OFFICE VISIT (OUTPATIENT)
Dept: OTOLARYNGOLOGY | Facility: CLINIC | Age: 59
End: 2019-08-14
Payer: COMMERCIAL

## 2019-08-14 VITALS
DIASTOLIC BLOOD PRESSURE: 79 MMHG | BODY MASS INDEX: 26.07 KG/M2 | WEIGHT: 174 LBS | SYSTOLIC BLOOD PRESSURE: 138 MMHG | HEART RATE: 88 BPM

## 2019-08-14 DIAGNOSIS — H61.22 IMPACTED CERUMEN OF LEFT EAR: Primary | ICD-10-CM

## 2019-08-14 PROCEDURE — 69210 REMOVE IMPACTED EAR WAX UNI: CPT | Mod: S$GLB,,, | Performed by: NURSE PRACTITIONER

## 2019-08-14 PROCEDURE — 99999 PR PBB SHADOW E&M-EST. PATIENT-LVL III: CPT | Mod: PBBFAC,,, | Performed by: NURSE PRACTITIONER

## 2019-08-14 PROCEDURE — 69210 EAR CERUMEN REMOVAL: ICD-10-PCS | Mod: S$GLB,,, | Performed by: NURSE PRACTITIONER

## 2019-08-14 PROCEDURE — 99499 UNLISTED E&M SERVICE: CPT | Mod: S$GLB,,, | Performed by: NURSE PRACTITIONER

## 2019-08-14 PROCEDURE — 99999 PR PBB SHADOW E&M-EST. PATIENT-LVL III: ICD-10-PCS | Mod: PBBFAC,,, | Performed by: NURSE PRACTITIONER

## 2019-08-14 PROCEDURE — 99499 NO LOS: ICD-10-PCS | Mod: S$GLB,,, | Performed by: NURSE PRACTITIONER

## 2019-08-14 NOTE — LETTER
August 14, 2019      Danita Natarajan MD  [address]  [facility] [address]           [facility]  [address]  [facility] [address]  Phone: [phone]  Fax: [phone]          Patient: Parth Lund   MR Number: [id number]   YOB: 1960   Date of Visit: 8/14/2019       Dear Dr. Danita Natarajan:    Thank you for referring Parth Lund to me for evaluation. Attached you will find relevant portions of my assessment and plan of care.    If you have questions, please do not hesitate to call me. I look forward to following Parth Lund along with you.    Sincerely,    Maki Merritt, NP    Enclosure  CC:  No Recipients    If you would like to receive this communication electronically, please contact [email or web address] or [phone] to request more information on [employer] Link access.    For providers and/or their staff who would like to refer a patient to [facility], please contact us through our one-stop-shop provider referral line, [facility] , at [phone].    If you feel you have received this communication in error or would no longer like to receive these types of communications, please e-mail [email or web address]

## 2019-08-14 NOTE — PROCEDURES
Ear Cerumen Removal  Date/Time: 8/14/2019 11:06 AM  Performed by: Maki Merritt NP  Authorized by: Maki Merritt NP     Location details:  Left ear  Procedure type: curette    Cerumen  Removal Results:  Cerumen completely removed  Patient tolerance:  Patient tolerated the procedure well with no immediate complications     Procedure Note:    The patient was brought to the minor procedure room and placed under the operating microscope of the left ear canal which was cleaned of ceruminous debris. Using a combination of suction, curettes and cup forceps the patient's cerumen impaction was removed. The tympanic membrane was evaluated and was unremarkable. The patient tolerated the procedure well. There were no complications.

## 2019-08-27 ENCOUNTER — TELEPHONE (OUTPATIENT)
Dept: PRIMARY CARE CLINIC | Facility: CLINIC | Age: 59
End: 2019-08-27

## 2019-10-30 ENCOUNTER — LAB VISIT (OUTPATIENT)
Dept: LAB | Facility: HOSPITAL | Age: 59
End: 2019-10-30
Attending: INTERNAL MEDICINE
Payer: COMMERCIAL

## 2019-10-30 DIAGNOSIS — E11.9 TYPE 2 DIABETES MELLITUS WITHOUT COMPLICATION, WITHOUT LONG-TERM CURRENT USE OF INSULIN: ICD-10-CM

## 2019-10-30 LAB
ESTIMATED AVG GLUCOSE: 126 MG/DL (ref 68–131)
HBA1C MFR BLD HPLC: 6 % (ref 4–5.6)

## 2019-10-30 PROCEDURE — 83036 HEMOGLOBIN GLYCOSYLATED A1C: CPT

## 2019-10-30 PROCEDURE — 36415 COLL VENOUS BLD VENIPUNCTURE: CPT | Mod: PN

## 2019-11-08 ENCOUNTER — PATIENT OUTREACH (OUTPATIENT)
Dept: ADMINISTRATIVE | Facility: HOSPITAL | Age: 59
End: 2019-11-08

## 2019-11-08 NOTE — PROGRESS NOTES
Pre-visit chart review completed.  Immunizations reviewed and updated.    Patient due for the following    Shingles Vaccine (1 of 2)    Foot Exam     Eye Exam     Influenza Vaccine (1)

## 2019-11-22 ENCOUNTER — OFFICE VISIT (OUTPATIENT)
Dept: PRIMARY CARE CLINIC | Facility: CLINIC | Age: 59
End: 2019-11-22
Payer: COMMERCIAL

## 2019-11-22 VITALS
SYSTOLIC BLOOD PRESSURE: 136 MMHG | HEIGHT: 68 IN | TEMPERATURE: 98 F | DIASTOLIC BLOOD PRESSURE: 72 MMHG | WEIGHT: 167.56 LBS | HEART RATE: 64 BPM | BODY MASS INDEX: 25.39 KG/M2

## 2019-11-22 DIAGNOSIS — I10 ESSENTIAL HYPERTENSION: ICD-10-CM

## 2019-11-22 DIAGNOSIS — Z23 INFLUENZA VACCINE NEEDED: ICD-10-CM

## 2019-11-22 DIAGNOSIS — E11.59 TYPE 2 DIABETES MELLITUS WITH OTHER CIRCULATORY COMPLICATION, WITHOUT LONG-TERM CURRENT USE OF INSULIN: Primary | ICD-10-CM

## 2019-11-22 DIAGNOSIS — E78.5 HYPERLIPIDEMIA, UNSPECIFIED HYPERLIPIDEMIA TYPE: ICD-10-CM

## 2019-11-22 PROCEDURE — 90471 IMMUNIZATION ADMIN: CPT | Mod: S$GLB,,, | Performed by: INTERNAL MEDICINE

## 2019-11-22 PROCEDURE — 99999 PR PBB SHADOW E&M-EST. PATIENT-LVL III: CPT | Mod: PBBFAC,,, | Performed by: INTERNAL MEDICINE

## 2019-11-22 PROCEDURE — 99214 PR OFFICE/OUTPT VISIT, EST, LEVL IV, 30-39 MIN: ICD-10-PCS | Mod: 25,S$GLB,, | Performed by: INTERNAL MEDICINE

## 2019-11-22 PROCEDURE — 99214 OFFICE O/P EST MOD 30 MIN: CPT | Mod: 25,S$GLB,, | Performed by: INTERNAL MEDICINE

## 2019-11-22 PROCEDURE — 99999 PR PBB SHADOW E&M-EST. PATIENT-LVL III: ICD-10-PCS | Mod: PBBFAC,,, | Performed by: INTERNAL MEDICINE

## 2019-11-22 PROCEDURE — 90686 IIV4 VACC NO PRSV 0.5 ML IM: CPT | Mod: S$GLB,,, | Performed by: INTERNAL MEDICINE

## 2019-11-22 PROCEDURE — 90686 FLU VACCINE (QUAD) GREATER THAN OR EQUAL TO 3YO PRESERVATIVE FREE IM: ICD-10-PCS | Mod: S$GLB,,, | Performed by: INTERNAL MEDICINE

## 2019-11-22 PROCEDURE — 90471 FLU VACCINE (QUAD) GREATER THAN OR EQUAL TO 3YO PRESERVATIVE FREE IM: ICD-10-PCS | Mod: S$GLB,,, | Performed by: INTERNAL MEDICINE

## 2019-11-22 RX ORDER — AMLODIPINE BESYLATE 10 MG/1
10 TABLET ORAL NIGHTLY
Qty: 90 TABLET | Refills: 1 | Status: SHIPPED | OUTPATIENT
Start: 2019-11-22 | End: 2020-05-14

## 2019-11-22 RX ORDER — LOSARTAN POTASSIUM 50 MG/1
50 TABLET ORAL DAILY
Qty: 90 TABLET | Refills: 1 | Status: SHIPPED | OUTPATIENT
Start: 2019-11-22 | End: 2020-07-16

## 2019-11-22 NOTE — PROGRESS NOTES
"Subjective:       Patient ID: Parth Lund is a 59 y.o. male.    Chief Complaint: Hypertension    Seen for initial visit to [facility] four months ago. Diabetes and Hypertension were not at goal, out of some of his medications. Refilled all. Here for f/u reporting strict compliance with all meds prescribed, no adverse effects. Has not monitored glucose lately, ran out of test strips. Repeat HbA1c three months later showed significant improvement.     PMH:   Hypertension.   Diabetes type 2, HbA1c 7.0% July '19, down to 6.0% on meds 10/19.   Hyperlipidemia, TChol 119, TG 44, HDL 46, LDL 64 July '19.   Erectile Dysfunction.    PSH: None.     Colonoscopy normal 4/14. Eye exam 8/18. Flu shot 2017. Tdap 2014. Pneumovax 2014. Prevnar 2015. Labs 7/19: CBC normal, CMP normal except Fasting Glucose 148, PSA 1.7, Urinalysis clear, Urine Microalbumin normal.     Social: Non-smoker, social alcohol. , 4 children.  for [employer].     FMH: HTN, DM, Chol, Stroke, Heart dis, Throat cancer.     NKDA.     Medications: list reviewed and reconciled.     Review of Systems   Constitutional: Negative for chills and fever.   Respiratory: Negative for cough and shortness of breath.    Cardiovascular: Negative for chest pain, palpitations and leg swelling.   Gastrointestinal: Negative for abdominal pain, blood in stool, diarrhea, nausea and vomiting.   Genitourinary: Negative for dysuria, frequency and hematuria.   Musculoskeletal: Negative for arthralgias and myalgias.   Skin: Negative for rash and wound.   Neurological: Negative for dizziness, weakness, numbness and headaches.       Objective:    /82, repeat 136/72, Pulse 64, Ht 5' 8", Wt 167.6 lbs (from 175.7)  Physical Exam   Constitutional: He is oriented to person, place, and time. He appears well-nourished. No distress.   HENT:   Nose: Nose normal.   Mouth/Throat: Oropharynx is clear and moist.   Eyes: Conjunctivae are normal. No scleral " icterus.   Cardiovascular: Normal rate, regular rhythm and normal heart sounds.   Pulmonary/Chest: Effort normal and breath sounds normal. No respiratory distress. He has no wheezes. He has no rales.   Musculoskeletal: Normal range of motion. He exhibits no edema.   Protective Sensation (w/ 10 gram monofilament):  Right: Intact  Left: Intact    Visual Inspection:  Normal -  Bilateral    Pedal Pulses:   Right: Present  Left: Present    Posterior tibialis:   Right:Present  Left: Present     Neurological: He is alert and oriented to person, place, and time. No cranial nerve deficit. Coordination normal.   Skin: Skin is warm and dry.   Psychiatric: He has a normal mood and affect. His behavior is normal.       Assessment:       1. Type 2 diabetes mellitus with other circulatory complication, without long-term current use of insulin    2. Essential hypertension    3. Hyperlipidemia, unspecified hyperlipidemia type    4. Influenza vaccine needed        Plan:       Type 2 diabetes mellitus with other circulatory complication, without long-term current use of insulin        -     Much improved, continue present care. Eye exam to be scheduled.     Essential hypertension - much improved, continue same.   -     amLODIPine (NORVASC) 10 MG tablet; Take 1 tablet (10 mg total) by mouth every evening.  Dispense: 90 tablet; Refill: 1  -     losartan (COZAAR) 50 MG tablet; Take 1 tablet (50 mg total) by mouth once daily.  Dispense: 90 tablet; Refill: 1    Hyperlipidemia, unspecified hyperlipidemia type - at goal, continue same.     Influenza vaccine needed - Flu shot today.

## 2019-11-22 NOTE — PROGRESS NOTES
"Patient was given vaccine information sheet for the Flu Vaccine. The area of injection was palpated using the acromion process as a landmark. This area was cleaned with alcohol. Using a 25g 1" safety needle, 0.5mL of the vaccine was placed into the right muscle. The injection site was dressed with a bandage. Patient experienced no complications and was discharged in stable condition. Fluarix vaccine Lot: [id number] Exp: 06/30/2020.  Cayden Basilio LPN      "

## 2019-12-16 ENCOUNTER — PATIENT OUTREACH (OUTPATIENT)
Dept: ADMINISTRATIVE | Facility: OTHER | Age: 59
End: 2019-12-16

## 2019-12-17 ENCOUNTER — OFFICE VISIT (OUTPATIENT)
Dept: OPTOMETRY | Facility: CLINIC | Age: 59
End: 2019-12-17
Payer: COMMERCIAL

## 2019-12-17 DIAGNOSIS — H52.4 PRESBYOPIA: ICD-10-CM

## 2019-12-17 DIAGNOSIS — E11.9 TYPE 2 DIABETES MELLITUS WITHOUT RETINOPATHY: Primary | ICD-10-CM

## 2019-12-17 PROCEDURE — 92014 COMPRE OPH EXAM EST PT 1/>: CPT | Mod: S$GLB,,, | Performed by: OPTOMETRIST

## 2019-12-17 PROCEDURE — 99999 PR PBB SHADOW E&M-EST. PATIENT-LVL II: ICD-10-PCS | Mod: PBBFAC,,, | Performed by: OPTOMETRIST

## 2019-12-17 PROCEDURE — 99999 PR PBB SHADOW E&M-EST. PATIENT-LVL II: CPT | Mod: PBBFAC,,, | Performed by: OPTOMETRIST

## 2019-12-17 PROCEDURE — 92014 PR EYE EXAM, EST PATIENT,COMPREHESV: ICD-10-PCS | Mod: S$GLB,,, | Performed by: OPTOMETRIST

## 2020-05-13 DIAGNOSIS — E11.9 TYPE 2 DIABETES MELLITUS WITHOUT COMPLICATION, WITHOUT LONG-TERM CURRENT USE OF INSULIN: ICD-10-CM

## 2020-05-13 DIAGNOSIS — E78.5 HYPERLIPIDEMIA, UNSPECIFIED HYPERLIPIDEMIA TYPE: ICD-10-CM

## 2020-05-13 RX ORDER — ATORVASTATIN CALCIUM 20 MG/1
TABLET, FILM COATED ORAL
Qty: 30 TABLET | Refills: 2 | Status: SHIPPED | OUTPATIENT
Start: 2020-05-13 | End: 2020-08-09 | Stop reason: SDUPTHER

## 2020-05-13 RX ORDER — GLYBURIDE 5 MG/1
TABLET ORAL
Qty: 30 TABLET | Refills: 2 | Status: SHIPPED | OUTPATIENT
Start: 2020-05-13 | End: 2020-08-09

## 2020-05-14 DIAGNOSIS — I10 ESSENTIAL HYPERTENSION: ICD-10-CM

## 2020-05-14 RX ORDER — AMLODIPINE BESYLATE 10 MG/1
TABLET ORAL
Qty: 30 TABLET | Refills: 5 | Status: SHIPPED | OUTPATIENT
Start: 2020-05-14 | End: 2021-01-06

## 2020-07-21 ENCOUNTER — OFFICE VISIT (OUTPATIENT)
Dept: PRIMARY CARE CLINIC | Facility: CLINIC | Age: 60
End: 2020-07-21
Payer: COMMERCIAL

## 2020-07-21 VITALS
TEMPERATURE: 99 F | SYSTOLIC BLOOD PRESSURE: 125 MMHG | RESPIRATION RATE: 19 BRPM | DIASTOLIC BLOOD PRESSURE: 65 MMHG | BODY MASS INDEX: 26.86 KG/M2 | WEIGHT: 177.25 LBS | HEART RATE: 66 BPM | HEIGHT: 68 IN | OXYGEN SATURATION: 98 %

## 2020-07-21 DIAGNOSIS — E11.59 TYPE 2 DIABETES MELLITUS WITH OTHER CIRCULATORY COMPLICATION, WITHOUT LONG-TERM CURRENT USE OF INSULIN: ICD-10-CM

## 2020-07-21 DIAGNOSIS — I10 ESSENTIAL HYPERTENSION: ICD-10-CM

## 2020-07-21 DIAGNOSIS — Z12.5 PROSTATE CANCER SCREENING: ICD-10-CM

## 2020-07-21 DIAGNOSIS — Z23 NEED FOR SHINGLES VACCINE: ICD-10-CM

## 2020-07-21 DIAGNOSIS — Z11.4 SCREENING FOR HIV (HUMAN IMMUNODEFICIENCY VIRUS): ICD-10-CM

## 2020-07-21 DIAGNOSIS — Z00.00 ROUTINE MEDICAL EXAM: Primary | ICD-10-CM

## 2020-07-21 LAB
ALBUMIN/CREAT UR: 4.7 UG/MG (ref 0–30)
BACTERIA #/AREA URNS AUTO: ABNORMAL /HPF
BILIRUB UR QL STRIP: NEGATIVE
CLARITY UR REFRACT.AUTO: CLEAR
COLOR UR AUTO: YELLOW
CREAT UR-MCNC: 234 MG/DL (ref 23–375)
GLUCOSE UR QL STRIP: ABNORMAL
HGB UR QL STRIP: NEGATIVE
KETONES UR QL STRIP: NEGATIVE
LEUKOCYTE ESTERASE UR QL STRIP: NEGATIVE
MICROALBUMIN UR DL<=1MG/L-MCNC: 11 UG/ML
MICROSCOPIC COMMENT: ABNORMAL
NITRITE UR QL STRIP: NEGATIVE
PH UR STRIP: 7 [PH] (ref 5–8)
PROT UR QL STRIP: NEGATIVE
RBC #/AREA URNS AUTO: 1 /HPF (ref 0–4)
SP GR UR STRIP: 1.02 (ref 1–1.03)
SQUAMOUS #/AREA URNS AUTO: 0 /HPF
URN SPEC COLLECT METH UR: ABNORMAL
WBC #/AREA URNS AUTO: 1 /HPF (ref 0–5)
YEAST UR QL AUTO: ABNORMAL

## 2020-07-21 PROCEDURE — 99396 PR PREVENTIVE VISIT,EST,40-64: ICD-10-PCS | Mod: 25,S$GLB,, | Performed by: INTERNAL MEDICINE

## 2020-07-21 PROCEDURE — 81001 URINALYSIS AUTO W/SCOPE: CPT

## 2020-07-21 PROCEDURE — 90471 IMMUNIZATION ADMIN: CPT | Mod: S$GLB,,, | Performed by: INTERNAL MEDICINE

## 2020-07-21 PROCEDURE — 99999 PR PBB SHADOW E&M-EST. PATIENT-LVL IV: ICD-10-PCS | Mod: PBBFAC,,, | Performed by: INTERNAL MEDICINE

## 2020-07-21 PROCEDURE — 99396 PREV VISIT EST AGE 40-64: CPT | Mod: 25,S$GLB,, | Performed by: INTERNAL MEDICINE

## 2020-07-21 PROCEDURE — 90750 ZOSTER RECOMBINANT VACCINE: ICD-10-PCS | Mod: S$GLB,,, | Performed by: INTERNAL MEDICINE

## 2020-07-21 PROCEDURE — 99999 PR PBB SHADOW E&M-EST. PATIENT-LVL IV: CPT | Mod: PBBFAC,,, | Performed by: INTERNAL MEDICINE

## 2020-07-21 PROCEDURE — 82043 UR ALBUMIN QUANTITATIVE: CPT

## 2020-07-21 PROCEDURE — 90471 ZOSTER RECOMBINANT VACCINE: ICD-10-PCS | Mod: S$GLB,,, | Performed by: INTERNAL MEDICINE

## 2020-07-21 PROCEDURE — 90750 HZV VACC RECOMBINANT IM: CPT | Mod: S$GLB,,, | Performed by: INTERNAL MEDICINE

## 2020-07-21 NOTE — PROGRESS NOTES
Subjective:       Patient ID: Parth Lund is a 60 y.o. male.    Chief Complaint: Annual Exam    Last seen 8 months ago. Returns for annual physical and f/u chronic medical conditions. Compliant with meds as prescribed, but complains of cost of Janumet. Denies any COVID symptoms, no known contact. No home BP or glucose monitoring reported.     PMH:   Hypertension.   Diabetes type 2, HbA1c 7.0% July '19, down to 6.0% on meds 10/19.   Hyperlipidemia, TChol 119, TG 44, HDL 46, LDL 64 July '19.   Erectile Dysfunction.    PSH: None.     Colonoscopy normal 4/14. Eye exam 12/19. Flu shot 11/19. Tdap 2014. Pneumovax 2014. Prevnar 2015. Labs 7/19: CBC normal, CMP normal except Fasting Glucose 148, PSA 1.7, Urinalysis clear, Urine Microalbumin normal.     Social: Non-smoker, social alcohol. , 4 children.  for [employer].     FMH: HTN, DM, Chol, Stroke, Heart dis, Throat cancer.     NKDA.     Medications: list reviewed and reconciled.     Review of Systems   Constitutional: Negative for activity change, appetite change, chills, fatigue, fever and unexpected weight change.   HENT: Negative for nasal congestion, ear pain, hearing loss, postnasal drip, rhinorrhea, sore throat, trouble swallowing and voice change.    Eyes: Negative for pain and visual disturbance.   Respiratory: Negative for apnea, cough, chest tightness, shortness of breath and wheezing.    Cardiovascular: Negative for chest pain, palpitations and leg swelling.   Gastrointestinal: Negative for abdominal pain, blood in stool, constipation, diarrhea, nausea and vomiting.   Genitourinary: Negative for dysuria, hematuria, scrotal swelling and urgency.        Mild decrease in urinary stream with frequency.   Musculoskeletal: Negative for arthralgias, back pain, gait problem, myalgias, neck pain and neck stiffness.   Integumentary:  Negative for color change and rash.   Neurological: Negative for dizziness, seizures, syncope, weakness,  "numbness and headaches.   Hematological: Negative for adenopathy. Does not bruise/bleed easily.   Psychiatric/Behavioral: Negative for agitation, dysphoric mood and sleep disturbance. The patient is not nervous/anxious.          Objective:    /65, Pulse 66, Temp 99, O2 Sat 98%, Ht 5' 8", Wt 177 lbs (from 167 a yr ago), BMI=27  Physical Exam  Constitutional:       General: He is not in acute distress.     Appearance: Normal appearance. He is well-developed. He is not diaphoretic.   HENT:      Head: Normocephalic and atraumatic.      Right Ear: Tympanic membrane, ear canal and external ear normal.      Left Ear: Tympanic membrane, ear canal and external ear normal.      Nose: Nose normal.      Mouth/Throat:      Mouth: Mucous membranes are moist.   Eyes:      General: No scleral icterus.     Extraocular Movements: Extraocular movements intact.      Conjunctiva/sclera: Conjunctivae normal.      Pupils: Pupils are equal, round, and reactive to light.   Neck:      Musculoskeletal: Normal range of motion and neck supple.      Thyroid: No thyromegaly.      Vascular: No carotid bruit or JVD.   Cardiovascular:      Rate and Rhythm: Normal rate and regular rhythm.      Pulses: Normal pulses.      Heart sounds: Normal heart sounds. No murmur. No friction rub. No gallop.    Pulmonary:      Effort: Pulmonary effort is normal. No respiratory distress.      Breath sounds: Normal breath sounds. No wheezing, rhonchi or rales.   Abdominal:      General: Bowel sounds are normal. There is no distension.      Palpations: Abdomen is soft. There is no mass.      Tenderness: There is no abdominal tenderness.      Hernia: No hernia is present.   Musculoskeletal: Normal range of motion.         General: No tenderness or deformity.      Right lower leg: No edema.      Left lower leg: No edema.      Comments: Protective Sensation:  Right: Intact  Left: Intact    Visual Inspection:  Normal -  Bilateral    Pedal Pulses:   Right: " Present  Left: Present    Posterior tibialis:   Right:Present  Left: Present     Lymphadenopathy:      Cervical: No cervical adenopathy.   Skin:     General: Skin is warm and dry.      Findings: No rash.   Neurological:      General: No focal deficit present.      Mental Status: He is alert and oriented to person, place, and time.      Cranial Nerves: No cranial nerve deficit.      Motor: No abnormal muscle tone.      Coordination: Coordination normal.      Gait: Gait normal.      Deep Tendon Reflexes: Reflexes are normal and symmetric.   Psychiatric:         Mood and Affect: Mood normal.         Behavior: Behavior normal.         Thought Content: Thought content normal.         Judgment: Judgment normal.         Assessment:       1. Routine medical exam    2. Type 2 diabetes mellitus with other circulatory complication, without long-term current use of insulin    3. Essential hypertension    4. Prostate cancer screening    5. Screening for HIV (human immunodeficiency virus)    6. Need for shingles vaccine        Plan:       Routine medical exam  -     CBC auto differential; Future; Expected date: 07/21/2020  -     Comprehensive metabolic panel; Future; Expected date: 07/21/2020  -     Lipid Panel; Future; Expected date: 07/21/2020  -     Urinalysis    Type 2 diabetes mellitus with other circulatory complication, without long-term current use of insulin  -     Hemoglobin A1C; Future; Expected date: 07/21/2020  -     Microalbumin/creatinine urine ratio  -     May be able to reduce medication, eliminate Janvuia?    Essential hypertension controlled, continue same.     Prostate cancer screening  -     PSA, Screening; Future; Expected date: 07/21/2020    Screening for HIV (human immunodeficiency virus)  -     HIV 1/2 Ag/Ab (4th Gen); Future; Expected date: 07/21/2020    Need for shingles vaccine - Shingrix today.     Flu shot when available.

## 2020-07-21 NOTE — PROGRESS NOTES
Two patient identifiers verified.  Allergies reviewed.  Shingrix #1 IM administered to right deltoid per MD order.  Patient tolerated injection well; no redness, bleeding, or bruising noted to injection site.  Patient instructed to remain in clinic setting for 15 minutes.  Verbalizes understanding.

## 2020-07-24 ENCOUNTER — LAB VISIT (OUTPATIENT)
Dept: LAB | Facility: HOSPITAL | Age: 60
End: 2020-07-24
Attending: INTERNAL MEDICINE
Payer: COMMERCIAL

## 2020-07-24 DIAGNOSIS — Z00.00 ROUTINE MEDICAL EXAM: ICD-10-CM

## 2020-07-24 DIAGNOSIS — Z11.4 SCREENING FOR HIV (HUMAN IMMUNODEFICIENCY VIRUS): ICD-10-CM

## 2020-07-24 DIAGNOSIS — Z12.5 PROSTATE CANCER SCREENING: ICD-10-CM

## 2020-07-24 DIAGNOSIS — E11.59 TYPE 2 DIABETES MELLITUS WITH OTHER CIRCULATORY COMPLICATION, WITHOUT LONG-TERM CURRENT USE OF INSULIN: ICD-10-CM

## 2020-07-24 LAB
ALBUMIN SERPL BCP-MCNC: 3.9 G/DL (ref 3.5–5.2)
ALP SERPL-CCNC: 129 U/L (ref 55–135)
ALT SERPL W/O P-5'-P-CCNC: 26 U/L (ref 10–44)
ANION GAP SERPL CALC-SCNC: 8 MMOL/L (ref 8–16)
AST SERPL-CCNC: 17 U/L (ref 10–40)
BASOPHILS # BLD AUTO: 0.04 K/UL (ref 0–0.2)
BASOPHILS NFR BLD: 0.9 % (ref 0–1.9)
BILIRUB SERPL-MCNC: 0.5 MG/DL (ref 0.1–1)
BUN SERPL-MCNC: 11 MG/DL (ref 8–23)
CALCIUM SERPL-MCNC: 9.6 MG/DL (ref 8.7–10.5)
CHLORIDE SERPL-SCNC: 103 MMOL/L (ref 95–110)
CHOLEST SERPL-MCNC: 120 MG/DL (ref 120–199)
CHOLEST/HDLC SERPL: 2.5 {RATIO} (ref 2–5)
CO2 SERPL-SCNC: 29 MMOL/L (ref 23–29)
COMPLEXED PSA SERPL-MCNC: 1.4 NG/ML (ref 0–4)
CREAT SERPL-MCNC: 1.2 MG/DL (ref 0.5–1.4)
DIFFERENTIAL METHOD: ABNORMAL
EOSINOPHIL # BLD AUTO: 0.3 K/UL (ref 0–0.5)
EOSINOPHIL NFR BLD: 6.7 % (ref 0–8)
ERYTHROCYTE [DISTWIDTH] IN BLOOD BY AUTOMATED COUNT: 12.9 % (ref 11.5–14.5)
EST. GFR  (AFRICAN AMERICAN): >60 ML/MIN/1.73 M^2
EST. GFR  (NON AFRICAN AMERICAN): >60 ML/MIN/1.73 M^2
ESTIMATED AVG GLUCOSE: 180 MG/DL (ref 68–131)
GLUCOSE SERPL-MCNC: 152 MG/DL (ref 70–110)
HBA1C MFR BLD HPLC: 7.9 % (ref 4–5.6)
HCT VFR BLD AUTO: 43.9 % (ref 40–54)
HDLC SERPL-MCNC: 48 MG/DL (ref 40–75)
HDLC SERPL: 40 % (ref 20–50)
HGB BLD-MCNC: 13.4 G/DL (ref 14–18)
IMM GRANULOCYTES # BLD AUTO: 0.01 K/UL (ref 0–0.04)
IMM GRANULOCYTES NFR BLD AUTO: 0.2 % (ref 0–0.5)
LDLC SERPL CALC-MCNC: 62.4 MG/DL (ref 63–159)
LYMPHOCYTES # BLD AUTO: 1.2 K/UL (ref 1–4.8)
LYMPHOCYTES NFR BLD: 26.6 % (ref 18–48)
MCH RBC QN AUTO: 27.6 PG (ref 27–31)
MCHC RBC AUTO-ENTMCNC: 30.5 G/DL (ref 32–36)
MCV RBC AUTO: 91 FL (ref 82–98)
MONOCYTES # BLD AUTO: 0.6 K/UL (ref 0.3–1)
MONOCYTES NFR BLD: 13.2 % (ref 4–15)
NEUTROPHILS # BLD AUTO: 2.3 K/UL (ref 1.8–7.7)
NEUTROPHILS NFR BLD: 52.4 % (ref 38–73)
NONHDLC SERPL-MCNC: 72 MG/DL
NRBC BLD-RTO: 0 /100 WBC
PLATELET # BLD AUTO: 218 K/UL (ref 150–350)
PMV BLD AUTO: 11.3 FL (ref 9.2–12.9)
POTASSIUM SERPL-SCNC: 3.9 MMOL/L (ref 3.5–5.1)
PROT SERPL-MCNC: 7.5 G/DL (ref 6–8.4)
RBC # BLD AUTO: 4.85 M/UL (ref 4.6–6.2)
SODIUM SERPL-SCNC: 140 MMOL/L (ref 136–145)
TRIGL SERPL-MCNC: 48 MG/DL (ref 30–150)
WBC # BLD AUTO: 4.33 K/UL (ref 3.9–12.7)

## 2020-07-24 PROCEDURE — 36415 COLL VENOUS BLD VENIPUNCTURE: CPT | Mod: PN

## 2020-07-24 PROCEDURE — 86703 HIV-1/HIV-2 1 RESULT ANTBDY: CPT

## 2020-07-24 PROCEDURE — 80053 COMPREHEN METABOLIC PANEL: CPT

## 2020-07-24 PROCEDURE — 85025 COMPLETE CBC W/AUTO DIFF WBC: CPT

## 2020-07-24 PROCEDURE — 84153 ASSAY OF PSA TOTAL: CPT

## 2020-07-24 PROCEDURE — 83036 HEMOGLOBIN GLYCOSYLATED A1C: CPT

## 2020-07-24 PROCEDURE — 80061 LIPID PANEL: CPT

## 2020-07-27 LAB — HIV 1+2 AB+HIV1 P24 AG SERPL QL IA: NEGATIVE

## 2020-08-09 ENCOUNTER — TELEPHONE (OUTPATIENT)
Dept: PRIMARY CARE CLINIC | Facility: CLINIC | Age: 60
End: 2020-08-09

## 2020-08-09 DIAGNOSIS — E11.9 TYPE 2 DIABETES MELLITUS WITHOUT COMPLICATION, WITHOUT LONG-TERM CURRENT USE OF INSULIN: ICD-10-CM

## 2020-08-09 DIAGNOSIS — E78.5 HYPERLIPIDEMIA, UNSPECIFIED HYPERLIPIDEMIA TYPE: ICD-10-CM

## 2020-08-09 RX ORDER — GLYBURIDE 5 MG/1
5 TABLET ORAL 2 TIMES DAILY WITH MEALS
Qty: 60 TABLET | Refills: 2 | Status: SHIPPED | OUTPATIENT
Start: 2020-08-09 | End: 2020-10-16

## 2020-08-09 RX ORDER — ATORVASTATIN CALCIUM 20 MG/1
20 TABLET, FILM COATED ORAL DAILY
Qty: 90 TABLET | Refills: 3 | Status: SHIPPED | OUTPATIENT
Start: 2020-08-09 | End: 2021-07-23 | Stop reason: SDUPTHER

## 2020-08-09 NOTE — TELEPHONE ENCOUNTER
Labs are all normal except Diabetes is not controlled. Hemoglobin A1c is 7.9 which is an average blood sugar of 180. Continue Janumet one tab twice a day, increase Glyburide 5 mg from one a day, to one tab twice a day with meals. Resume monitoring glucose at home, especially fasting sugar in the morning. Schedule repeat HbA1c in 3 months.

## 2020-09-18 NOTE — TELEPHONE ENCOUNTER
Letter was sent with results and recommendations. Please just schedule the labs (ordered early August) in early November.

## 2020-09-21 ENCOUNTER — CLINICAL SUPPORT (OUTPATIENT)
Dept: PRIMARY CARE CLINIC | Facility: CLINIC | Age: 60
End: 2020-09-21
Payer: COMMERCIAL

## 2020-09-21 DIAGNOSIS — Z23 NEED FOR SHINGLES VACCINE: Primary | ICD-10-CM

## 2020-09-21 PROCEDURE — 90471 IMMUNIZATION ADMIN: CPT | Mod: S$GLB,,, | Performed by: INTERNAL MEDICINE

## 2020-09-21 PROCEDURE — 90750 HZV VACC RECOMBINANT IM: CPT | Mod: S$GLB,,, | Performed by: INTERNAL MEDICINE

## 2020-09-21 PROCEDURE — 90750 ZOSTER RECOMBINANT VACCINE: ICD-10-PCS | Mod: S$GLB,,, | Performed by: INTERNAL MEDICINE

## 2020-09-21 PROCEDURE — 90471 ZOSTER RECOMBINANT VACCINE: ICD-10-PCS | Mod: S$GLB,,, | Performed by: INTERNAL MEDICINE

## 2020-11-20 ENCOUNTER — LAB VISIT (OUTPATIENT)
Dept: LAB | Facility: HOSPITAL | Age: 60
End: 2020-11-20
Attending: INTERNAL MEDICINE
Payer: COMMERCIAL

## 2020-11-20 DIAGNOSIS — E11.9 TYPE 2 DIABETES MELLITUS WITHOUT COMPLICATION, WITHOUT LONG-TERM CURRENT USE OF INSULIN: ICD-10-CM

## 2020-11-20 LAB
ESTIMATED AVG GLUCOSE: 137 MG/DL (ref 68–131)
GLUCOSE SERPL-MCNC: 165 MG/DL (ref 70–110)
HBA1C MFR BLD HPLC: 6.4 % (ref 4–5.6)

## 2020-11-20 PROCEDURE — 36415 COLL VENOUS BLD VENIPUNCTURE: CPT | Mod: PN

## 2020-11-20 PROCEDURE — 82947 ASSAY GLUCOSE BLOOD QUANT: CPT

## 2020-11-20 PROCEDURE — 83036 HEMOGLOBIN GLYCOSYLATED A1C: CPT

## 2021-01-22 ENCOUNTER — OFFICE VISIT (OUTPATIENT)
Dept: PRIMARY CARE CLINIC | Facility: CLINIC | Age: 61
End: 2021-01-22
Payer: COMMERCIAL

## 2021-01-22 VITALS
HEIGHT: 68 IN | TEMPERATURE: 98 F | OXYGEN SATURATION: 99 % | HEART RATE: 87 BPM | BODY MASS INDEX: 27.03 KG/M2 | DIASTOLIC BLOOD PRESSURE: 78 MMHG | RESPIRATION RATE: 17 BRPM | WEIGHT: 178.38 LBS | SYSTOLIC BLOOD PRESSURE: 123 MMHG

## 2021-01-22 DIAGNOSIS — I10 ESSENTIAL HYPERTENSION: ICD-10-CM

## 2021-01-22 DIAGNOSIS — Z23 INFLUENZA VACCINE NEEDED: ICD-10-CM

## 2021-01-22 DIAGNOSIS — E78.5 HYPERLIPIDEMIA, UNSPECIFIED HYPERLIPIDEMIA TYPE: ICD-10-CM

## 2021-01-22 DIAGNOSIS — E11.9 TYPE 2 DIABETES MELLITUS WITHOUT COMPLICATION, WITHOUT LONG-TERM CURRENT USE OF INSULIN: Primary | ICD-10-CM

## 2021-01-22 PROCEDURE — 99214 PR OFFICE/OUTPT VISIT, EST, LEVL IV, 30-39 MIN: ICD-10-PCS | Mod: S$GLB,,, | Performed by: INTERNAL MEDICINE

## 2021-01-22 PROCEDURE — 99214 OFFICE O/P EST MOD 30 MIN: CPT | Mod: S$GLB,,, | Performed by: INTERNAL MEDICINE

## 2021-01-22 PROCEDURE — 99999 PR PBB SHADOW E&M-EST. PATIENT-LVL IV: CPT | Mod: PBBFAC,,, | Performed by: INTERNAL MEDICINE

## 2021-01-22 PROCEDURE — 99999 PR PBB SHADOW E&M-EST. PATIENT-LVL IV: ICD-10-PCS | Mod: PBBFAC,,, | Performed by: INTERNAL MEDICINE

## 2021-01-22 RX ORDER — LOSARTAN POTASSIUM 50 MG/1
50 TABLET ORAL DAILY
Qty: 90 TABLET | Refills: 2 | Status: SHIPPED | OUTPATIENT
Start: 2021-01-22 | End: 2021-07-23 | Stop reason: SDUPTHER

## 2021-03-05 ENCOUNTER — HOSPITAL ENCOUNTER (EMERGENCY)
Facility: HOSPITAL | Age: 61
Discharge: HOME | End: 2021-03-05
Attending: EMERGENCY MEDICINE
Payer: COMMERCIAL

## 2021-03-05 ENCOUNTER — OFFICE VISIT (OUTPATIENT)
Dept: INTERNAL MEDICINE | Facility: CLINIC | Age: 61
End: 2021-03-05
Payer: COMMERCIAL

## 2021-03-05 ENCOUNTER — TELEPHONE (OUTPATIENT)
Dept: PRIMARY CARE CLINIC | Facility: CLINIC | Age: 61
End: 2021-03-05

## 2021-03-05 VITALS
HEART RATE: 73 BPM | HEIGHT: 68 IN | SYSTOLIC BLOOD PRESSURE: 132 MMHG | RESPIRATION RATE: 18 BRPM | DIASTOLIC BLOOD PRESSURE: 77 MMHG | WEIGHT: 167 LBS | BODY MASS INDEX: 25.31 KG/M2 | OXYGEN SATURATION: 99 % | TEMPERATURE: 98 F

## 2021-03-05 VITALS
BODY MASS INDEX: 25.42 KG/M2 | OXYGEN SATURATION: 98 % | HEART RATE: 89 BPM | WEIGHT: 167.75 LBS | SYSTOLIC BLOOD PRESSURE: 138 MMHG | DIASTOLIC BLOOD PRESSURE: 80 MMHG | HEIGHT: 68 IN

## 2021-03-05 DIAGNOSIS — R73.9 HYPERGLYCEMIA: ICD-10-CM

## 2021-03-05 DIAGNOSIS — E11.8 DM (DIABETES MELLITUS), TYPE 2 WITH COMPLICATIONS: ICD-10-CM

## 2021-03-05 DIAGNOSIS — R35.0 URINARY FREQUENCY: Primary | ICD-10-CM

## 2021-03-05 DIAGNOSIS — R73.9 HYPERGLYCEMIA: Primary | ICD-10-CM

## 2021-03-05 LAB
ALBUMIN SERPL BCP-MCNC: 4 G/DL (ref 3.5–5.2)
ALLENS TEST: ABNORMAL
ALP SERPL-CCNC: 393 U/L (ref 55–135)
ALT SERPL W/O P-5'-P-CCNC: 30 U/L (ref 10–44)
ANION GAP SERPL CALC-SCNC: 10 MMOL/L (ref 8–16)
AST SERPL-CCNC: 16 U/L (ref 10–40)
B-OH-BUTYR BLD STRIP-SCNC: 0.2 MMOL/L (ref 0–0.5)
BACTERIA #/AREA URNS AUTO: NORMAL /HPF
BASOPHILS # BLD AUTO: 0.05 K/UL (ref 0–0.2)
BASOPHILS NFR BLD: 0.8 % (ref 0–1.9)
BILIRUB SERPL-MCNC: 0.4 MG/DL (ref 0.1–1)
BILIRUB SERPL-MCNC: NEGATIVE MG/DL
BILIRUB UR QL STRIP: NEGATIVE
BLOOD, POC UA: NEGATIVE
BUN SERPL-MCNC: 16 MG/DL (ref 8–23)
CALCIUM SERPL-MCNC: 10 MG/DL (ref 8.7–10.5)
CHLORIDE SERPL-SCNC: 96 MMOL/L (ref 95–110)
CLARITY UR REFRACT.AUTO: CLEAR
CO2 SERPL-SCNC: 29 MMOL/L (ref 23–29)
COLOR UR AUTO: ABNORMAL
CREAT SERPL-MCNC: 1.5 MG/DL (ref 0.5–1.4)
DIFFERENTIAL METHOD: ABNORMAL
EOSINOPHIL # BLD AUTO: 0.2 K/UL (ref 0–0.5)
EOSINOPHIL NFR BLD: 4 % (ref 0–8)
ERYTHROCYTE [DISTWIDTH] IN BLOOD BY AUTOMATED COUNT: 12 % (ref 11.5–14.5)
EST. GFR  (AFRICAN AMERICAN): 57.3 ML/MIN/1.73 M^2
EST. GFR  (NON AFRICAN AMERICAN): 49.5 ML/MIN/1.73 M^2
GLUCOSE SERPL-MCNC: 524 MG/DL (ref 70–110)
GLUCOSE SERPL-MCNC: >500 MG/DL (ref 70–110)
GLUCOSE UR QL STRIP: ABNORMAL
GLUCOSE UR QL STRIP: ABNORMAL
HCO3 UR-SCNC: 31.9 MMOL/L (ref 24–28)
HCT VFR BLD AUTO: 41.1 % (ref 40–54)
HCV AB SERPL QL IA: NEGATIVE
HGB BLD-MCNC: 13.1 G/DL (ref 14–18)
HGB UR QL STRIP: NEGATIVE
HIV 1+2 AB+HIV1 P24 AG SERPL QL IA: NEGATIVE
IMM GRANULOCYTES # BLD AUTO: 0.01 K/UL (ref 0–0.04)
IMM GRANULOCYTES NFR BLD AUTO: 0.2 % (ref 0–0.5)
KETONES UR QL STRIP: NEGATIVE
KETONES UR QL STRIP: NEGATIVE
LEUKOCYTE ESTERASE UR QL STRIP: NEGATIVE
LEUKOCYTE ESTERASE URINE, POC: NEGATIVE
LYMPHOCYTES # BLD AUTO: 1.5 K/UL (ref 1–4.8)
LYMPHOCYTES NFR BLD: 25.3 % (ref 18–48)
MCH RBC QN AUTO: 27.3 PG (ref 27–31)
MCHC RBC AUTO-ENTMCNC: 31.9 G/DL (ref 32–36)
MCV RBC AUTO: 86 FL (ref 82–98)
MICROSCOPIC COMMENT: NORMAL
MONOCYTES # BLD AUTO: 0.7 K/UL (ref 0.3–1)
MONOCYTES NFR BLD: 11 % (ref 4–15)
NEUTROPHILS # BLD AUTO: 3.5 K/UL (ref 1.8–7.7)
NEUTROPHILS NFR BLD: 58.7 % (ref 38–73)
NITRITE UR QL STRIP: NEGATIVE
NITRITE, POC UA: NEGATIVE
NRBC BLD-RTO: 0 /100 WBC
PCO2 BLDA: 58 MMHG (ref 35–45)
PH SMN: 7.35 [PH] (ref 7.35–7.45)
PH UR STRIP: 6 [PH] (ref 5–8)
PH, POC UA: 6
PLATELET # BLD AUTO: 237 K/UL (ref 150–350)
PMV BLD AUTO: 11.2 FL (ref 9.2–12.9)
PO2 BLDA: 16 MMHG (ref 40–60)
POC BE: 6 MMOL/L
POC SATURATED O2: 18 % (ref 95–100)
POC TCO2: 34 MMOL/L (ref 24–29)
POCT GLUCOSE: 274 MG/DL (ref 70–110)
POCT GLUCOSE: 321 MG/DL (ref 70–110)
POCT GLUCOSE: 500 MG/DL (ref 70–110)
POTASSIUM SERPL-SCNC: 4.1 MMOL/L (ref 3.5–5.1)
PROT SERPL-MCNC: 8 G/DL (ref 6–8.4)
PROT UR QL STRIP: NEGATIVE
PROTEIN, POC: ABNORMAL
RBC # BLD AUTO: 4.79 M/UL (ref 4.6–6.2)
RBC #/AREA URNS AUTO: 0 /HPF (ref 0–4)
SAMPLE: ABNORMAL
SITE: ABNORMAL
SODIUM SERPL-SCNC: 135 MMOL/L (ref 136–145)
SP GR UR STRIP: >=1.03 (ref 1–1.03)
SPECIFIC GRAVITY, POC UA: 1.02
URN SPEC COLLECT METH UR: ABNORMAL
UROBILINOGEN, POC UA: NORMAL
WBC # BLD AUTO: 6.01 K/UL (ref 3.9–12.7)
YEAST UR QL AUTO: NORMAL

## 2021-03-05 PROCEDURE — 99284 EMERGENCY DEPT VISIT MOD MDM: CPT | Mod: 25

## 2021-03-05 PROCEDURE — 99284 PR EMERGENCY DEPT VISIT,LEVEL IV: ICD-10-PCS | Mod: ,,, | Performed by: PHYSICIAN ASSISTANT

## 2021-03-05 PROCEDURE — 82962 POCT GLUCOSE, HAND-HELD DEVICE: ICD-10-PCS | Mod: S$GLB,,, | Performed by: PHYSICIAN ASSISTANT

## 2021-03-05 PROCEDURE — 81001 URINALYSIS AUTO W/SCOPE: CPT | Performed by: PHYSICIAN ASSISTANT

## 2021-03-05 PROCEDURE — 81003 URINALYSIS AUTO W/O SCOPE: CPT | Mod: QW,S$GLB,, | Performed by: PHYSICIAN ASSISTANT

## 2021-03-05 PROCEDURE — 99999 PR PBB SHADOW E&M-EST. PATIENT-LVL III: CPT | Mod: PBBFAC,,, | Performed by: PHYSICIAN ASSISTANT

## 2021-03-05 PROCEDURE — 82962 GLUCOSE BLOOD TEST: CPT | Mod: S$GLB,,, | Performed by: PHYSICIAN ASSISTANT

## 2021-03-05 PROCEDURE — 85025 COMPLETE CBC W/AUTO DIFF WBC: CPT | Performed by: PHYSICIAN ASSISTANT

## 2021-03-05 PROCEDURE — 86803 HEPATITIS C AB TEST: CPT | Performed by: EMERGENCY MEDICINE

## 2021-03-05 PROCEDURE — 82803 BLOOD GASES ANY COMBINATION: CPT

## 2021-03-05 PROCEDURE — 82010 KETONE BODYS QUAN: CPT | Performed by: PHYSICIAN ASSISTANT

## 2021-03-05 PROCEDURE — 80053 COMPREHEN METABOLIC PANEL: CPT | Performed by: PHYSICIAN ASSISTANT

## 2021-03-05 PROCEDURE — 99214 PR OFFICE/OUTPT VISIT, EST, LEVL IV, 30-39 MIN: ICD-10-PCS | Mod: 25,S$GLB,, | Performed by: PHYSICIAN ASSISTANT

## 2021-03-05 PROCEDURE — 86703 HIV-1/HIV-2 1 RESULT ANTBDY: CPT | Performed by: EMERGENCY MEDICINE

## 2021-03-05 PROCEDURE — 99284 EMERGENCY DEPT VISIT MOD MDM: CPT | Mod: ,,, | Performed by: PHYSICIAN ASSISTANT

## 2021-03-05 PROCEDURE — 99900035 HC TECH TIME PER 15 MIN (STAT)

## 2021-03-05 PROCEDURE — 81003 POCT URINALYSIS: ICD-10-PCS | Mod: QW,S$GLB,, | Performed by: PHYSICIAN ASSISTANT

## 2021-03-05 PROCEDURE — 96361 HYDRATE IV INFUSION ADD-ON: CPT

## 2021-03-05 PROCEDURE — 96374 THER/PROPH/DIAG INJ IV PUSH: CPT

## 2021-03-05 PROCEDURE — 82962 GLUCOSE BLOOD TEST: CPT

## 2021-03-05 PROCEDURE — 25000003 PHARM REV CODE 250: Performed by: PHYSICIAN ASSISTANT

## 2021-03-05 PROCEDURE — 99214 OFFICE O/P EST MOD 30 MIN: CPT | Mod: 25,S$GLB,, | Performed by: PHYSICIAN ASSISTANT

## 2021-03-05 PROCEDURE — 99999 PR PBB SHADOW E&M-EST. PATIENT-LVL III: ICD-10-PCS | Mod: PBBFAC,,, | Performed by: PHYSICIAN ASSISTANT

## 2021-03-05 PROCEDURE — 63600175 PHARM REV CODE 636 W HCPCS: Performed by: PHYSICIAN ASSISTANT

## 2021-03-05 RX ORDER — METFORMIN HYDROCHLORIDE 1000 MG/1
1000 TABLET ORAL 2 TIMES DAILY WITH MEALS
Qty: 180 TABLET | Refills: 3 | Status: SHIPPED | OUTPATIENT
Start: 2021-03-05 | End: 2022-01-18

## 2021-03-05 RX ORDER — GLYBURIDE 5 MG/1
5 TABLET ORAL 2 TIMES DAILY WITH MEALS
Qty: 60 TABLET | Refills: 3 | Status: SHIPPED | OUTPATIENT
Start: 2021-03-05 | End: 2021-06-25

## 2021-03-05 RX ADMIN — SODIUM CHLORIDE 1000 ML: 0.9 INJECTION, SOLUTION INTRAVENOUS at 06:03

## 2021-03-05 RX ADMIN — INSULIN HUMAN 5 UNITS: 100 INJECTION, SOLUTION PARENTERAL at 10:03

## 2021-03-05 RX ADMIN — SODIUM CHLORIDE 1000 ML: 0.9 INJECTION, SOLUTION INTRAVENOUS at 08:03

## 2021-03-07 LAB
ALLENS TEST: ABNORMAL
HCO3 UR-SCNC: 31.9 MMOL/L (ref 24–28)
PCO2 BLDA: 58 MMHG (ref 35–45)
PH SMN: 7.35 [PH] (ref 7.35–7.45)
PO2 BLDA: 16 MMHG (ref 40–60)
POC BE: 6 MMOL/L
POC SATURATED O2: 18 % (ref 95–100)
POC TCO2: 34 MMOL/L (ref 24–29)
SAMPLE: ABNORMAL
SITE: ABNORMAL

## 2021-03-08 ENCOUNTER — TELEPHONE (OUTPATIENT)
Dept: PRIMARY CARE CLINIC | Facility: CLINIC | Age: 61
End: 2021-03-08

## 2021-03-11 ENCOUNTER — OFFICE VISIT (OUTPATIENT)
Dept: PRIMARY CARE CLINIC | Facility: CLINIC | Age: 61
End: 2021-03-11
Payer: COMMERCIAL

## 2021-03-11 ENCOUNTER — LAB VISIT (OUTPATIENT)
Dept: LAB | Facility: HOSPITAL | Age: 61
End: 2021-03-11
Attending: INTERNAL MEDICINE
Payer: COMMERCIAL

## 2021-03-11 VITALS
DIASTOLIC BLOOD PRESSURE: 64 MMHG | HEIGHT: 68 IN | WEIGHT: 169.75 LBS | SYSTOLIC BLOOD PRESSURE: 114 MMHG | OXYGEN SATURATION: 96 % | BODY MASS INDEX: 25.73 KG/M2 | HEART RATE: 67 BPM

## 2021-03-11 DIAGNOSIS — E11.65 UNCONTROLLED TYPE 2 DIABETES MELLITUS WITH HYPERGLYCEMIA, WITHOUT LONG-TERM CURRENT USE OF INSULIN: ICD-10-CM

## 2021-03-11 DIAGNOSIS — E78.5 HYPERLIPIDEMIA, UNSPECIFIED HYPERLIPIDEMIA TYPE: ICD-10-CM

## 2021-03-11 DIAGNOSIS — N17.9 ACUTE KIDNEY INJURY (NONTRAUMATIC): ICD-10-CM

## 2021-03-11 DIAGNOSIS — E11.65 UNCONTROLLED TYPE 2 DIABETES MELLITUS WITH HYPERGLYCEMIA, WITHOUT LONG-TERM CURRENT USE OF INSULIN: Primary | ICD-10-CM

## 2021-03-11 DIAGNOSIS — I10 ESSENTIAL HYPERTENSION: ICD-10-CM

## 2021-03-11 LAB
ANION GAP SERPL CALC-SCNC: 7 MMOL/L (ref 8–16)
BUN SERPL-MCNC: 14 MG/DL (ref 8–23)
CALCIUM SERPL-MCNC: 9.5 MG/DL (ref 8.7–10.5)
CHLORIDE SERPL-SCNC: 101 MMOL/L (ref 95–110)
CO2 SERPL-SCNC: 28 MMOL/L (ref 23–29)
CREAT SERPL-MCNC: 1.2 MG/DL (ref 0.5–1.4)
EST. GFR  (AFRICAN AMERICAN): >60 ML/MIN/1.73 M^2
EST. GFR  (NON AFRICAN AMERICAN): >60 ML/MIN/1.73 M^2
GLUCOSE SERPL-MCNC: 148 MG/DL (ref 70–110)
POTASSIUM SERPL-SCNC: 4.9 MMOL/L (ref 3.5–5.1)
SODIUM SERPL-SCNC: 136 MMOL/L (ref 136–145)

## 2021-03-11 PROCEDURE — 36415 COLL VENOUS BLD VENIPUNCTURE: CPT | Mod: PN | Performed by: INTERNAL MEDICINE

## 2021-03-11 PROCEDURE — 83036 HEMOGLOBIN GLYCOSYLATED A1C: CPT | Performed by: INTERNAL MEDICINE

## 2021-03-11 PROCEDURE — 99999 PR PBB SHADOW E&M-EST. PATIENT-LVL IV: CPT | Mod: PBBFAC,,, | Performed by: INTERNAL MEDICINE

## 2021-03-11 PROCEDURE — 80048 BASIC METABOLIC PNL TOTAL CA: CPT | Performed by: INTERNAL MEDICINE

## 2021-03-11 PROCEDURE — 99999 PR PBB SHADOW E&M-EST. PATIENT-LVL IV: ICD-10-PCS | Mod: PBBFAC,,, | Performed by: INTERNAL MEDICINE

## 2021-03-11 PROCEDURE — 99213 PR OFFICE/OUTPT VISIT, EST, LEVL III, 20-29 MIN: ICD-10-PCS | Mod: S$GLB,,, | Performed by: INTERNAL MEDICINE

## 2021-03-11 PROCEDURE — 99213 OFFICE O/P EST LOW 20 MIN: CPT | Mod: S$GLB,,, | Performed by: INTERNAL MEDICINE

## 2021-03-12 ENCOUNTER — TELEPHONE (OUTPATIENT)
Dept: INTERNAL MEDICINE | Facility: CLINIC | Age: 61
End: 2021-03-12

## 2021-03-12 LAB
ESTIMATED AVG GLUCOSE: 332 MG/DL (ref 68–131)
HBA1C MFR BLD: 13.2 % (ref 4–5.6)

## 2021-03-31 ENCOUNTER — PATIENT OUTREACH (OUTPATIENT)
Dept: ADMINISTRATIVE | Facility: OTHER | Age: 61
End: 2021-03-31

## 2021-04-05 ENCOUNTER — OFFICE VISIT (OUTPATIENT)
Dept: OPTOMETRY | Facility: CLINIC | Age: 61
End: 2021-04-05
Payer: COMMERCIAL

## 2021-04-05 DIAGNOSIS — E11.9 TYPE 2 DIABETES MELLITUS WITHOUT RETINOPATHY: Primary | ICD-10-CM

## 2021-04-05 DIAGNOSIS — H25.13 NUCLEAR SCLEROSIS OF BOTH EYES: ICD-10-CM

## 2021-04-05 DIAGNOSIS — H52.4 PRESBYOPIA: ICD-10-CM

## 2021-04-05 PROCEDURE — 92015 DETERMINE REFRACTIVE STATE: CPT | Mod: S$GLB,,, | Performed by: OPTOMETRIST

## 2021-04-05 PROCEDURE — 99999 PR PBB SHADOW E&M-EST. PATIENT-LVL III: ICD-10-PCS | Mod: PBBFAC,,, | Performed by: OPTOMETRIST

## 2021-04-05 PROCEDURE — 92015 PR REFRACTION: ICD-10-PCS | Mod: S$GLB,,, | Performed by: OPTOMETRIST

## 2021-04-05 PROCEDURE — 92014 COMPRE OPH EXAM EST PT 1/>: CPT | Mod: S$GLB,,, | Performed by: OPTOMETRIST

## 2021-04-05 PROCEDURE — 99999 PR PBB SHADOW E&M-EST. PATIENT-LVL III: CPT | Mod: PBBFAC,,, | Performed by: OPTOMETRIST

## 2021-04-05 PROCEDURE — 92014 PR EYE EXAM, EST PATIENT,COMPREHESV: ICD-10-PCS | Mod: S$GLB,,, | Performed by: OPTOMETRIST

## 2021-04-26 ENCOUNTER — TELEPHONE (OUTPATIENT)
Dept: ADMINISTRATIVE | Facility: HOSPITAL | Age: 61
End: 2021-04-26

## 2021-06-07 ENCOUNTER — PATIENT OUTREACH (OUTPATIENT)
Dept: ADMINISTRATIVE | Facility: HOSPITAL | Age: 61
End: 2021-06-07

## 2021-06-07 ENCOUNTER — TELEPHONE (OUTPATIENT)
Dept: ADMINISTRATIVE | Facility: HOSPITAL | Age: 61
End: 2021-06-07

## 2021-06-25 ENCOUNTER — TELEPHONE (OUTPATIENT)
Dept: PRIMARY CARE CLINIC | Facility: CLINIC | Age: 61
End: 2021-06-25

## 2021-06-25 DIAGNOSIS — E11.65 UNCONTROLLED TYPE 2 DIABETES MELLITUS WITH HYPERGLYCEMIA, WITHOUT LONG-TERM CURRENT USE OF INSULIN: Primary | ICD-10-CM

## 2021-07-02 ENCOUNTER — IMMUNIZATION (OUTPATIENT)
Dept: PRIMARY CARE CLINIC | Facility: CLINIC | Age: 61
End: 2021-07-02

## 2021-07-02 DIAGNOSIS — Z23 NEED FOR VACCINATION: Primary | ICD-10-CM

## 2021-07-02 PROCEDURE — 0031A COVID-19,VECTOR-NR,RS-AD26,PF,0.5 ML DOSE VACCINE (JANSSEN): CPT | Mod: CV19,S$GLB,, | Performed by: INTERNAL MEDICINE

## 2021-07-02 PROCEDURE — 91303 COVID-19,VECTOR-NR,RS-AD26,PF,0.5 ML DOSE VACCINE (JANSSEN): CPT | Mod: S$GLB,,, | Performed by: INTERNAL MEDICINE

## 2021-07-02 PROCEDURE — 0031A COVID-19,VECTOR-NR,RS-AD26,PF,0.5 ML DOSE VACCINE (JANSSEN): ICD-10-PCS | Mod: CV19,S$GLB,, | Performed by: INTERNAL MEDICINE

## 2021-07-02 PROCEDURE — 91303 COVID-19,VECTOR-NR,RS-AD26,PF,0.5 ML DOSE VACCINE (JANSSEN): ICD-10-PCS | Mod: S$GLB,,, | Performed by: INTERNAL MEDICINE

## 2021-07-15 ENCOUNTER — LAB VISIT (OUTPATIENT)
Dept: LAB | Facility: HOSPITAL | Age: 61
End: 2021-07-15
Attending: INTERNAL MEDICINE
Payer: COMMERCIAL

## 2021-07-15 DIAGNOSIS — E11.65 UNCONTROLLED TYPE 2 DIABETES MELLITUS WITH HYPERGLYCEMIA, WITHOUT LONG-TERM CURRENT USE OF INSULIN: ICD-10-CM

## 2021-07-15 LAB
ALBUMIN SERPL BCP-MCNC: 3.7 G/DL (ref 3.5–5.2)
ALP SERPL-CCNC: 127 U/L (ref 55–135)
ALT SERPL W/O P-5'-P-CCNC: 24 U/L (ref 10–44)
ANION GAP SERPL CALC-SCNC: 7 MMOL/L (ref 8–16)
AST SERPL-CCNC: 17 U/L (ref 10–40)
BILIRUB SERPL-MCNC: 0.5 MG/DL (ref 0.1–1)
BUN SERPL-MCNC: 12 MG/DL (ref 8–23)
CALCIUM SERPL-MCNC: 9.4 MG/DL (ref 8.7–10.5)
CHLORIDE SERPL-SCNC: 104 MMOL/L (ref 95–110)
CHOLEST SERPL-MCNC: 114 MG/DL (ref 120–199)
CHOLEST/HDLC SERPL: 2.5 {RATIO} (ref 2–5)
CO2 SERPL-SCNC: 27 MMOL/L (ref 23–29)
CREAT SERPL-MCNC: 1.2 MG/DL (ref 0.5–1.4)
EST. GFR  (AFRICAN AMERICAN): >60 ML/MIN/1.73 M^2
EST. GFR  (NON AFRICAN AMERICAN): >60 ML/MIN/1.73 M^2
ESTIMATED AVG GLUCOSE: 151 MG/DL (ref 68–131)
GLUCOSE SERPL-MCNC: 134 MG/DL (ref 70–110)
HBA1C MFR BLD: 6.9 % (ref 4–5.6)
HDLC SERPL-MCNC: 45 MG/DL (ref 40–75)
HDLC SERPL: 39.5 % (ref 20–50)
LDLC SERPL CALC-MCNC: 60.4 MG/DL (ref 63–159)
NONHDLC SERPL-MCNC: 69 MG/DL
POTASSIUM SERPL-SCNC: 3.8 MMOL/L (ref 3.5–5.1)
PROT SERPL-MCNC: 7.1 G/DL (ref 6–8.4)
SODIUM SERPL-SCNC: 138 MMOL/L (ref 136–145)
TRIGL SERPL-MCNC: 43 MG/DL (ref 30–150)

## 2021-07-15 PROCEDURE — 80053 COMPREHEN METABOLIC PANEL: CPT | Performed by: INTERNAL MEDICINE

## 2021-07-15 PROCEDURE — 36415 COLL VENOUS BLD VENIPUNCTURE: CPT | Mod: PN | Performed by: INTERNAL MEDICINE

## 2021-07-15 PROCEDURE — 80061 LIPID PANEL: CPT | Performed by: INTERNAL MEDICINE

## 2021-07-15 PROCEDURE — 83036 HEMOGLOBIN GLYCOSYLATED A1C: CPT | Performed by: INTERNAL MEDICINE

## 2021-07-23 ENCOUNTER — OFFICE VISIT (OUTPATIENT)
Dept: PRIMARY CARE CLINIC | Facility: CLINIC | Age: 61
End: 2021-07-23
Payer: COMMERCIAL

## 2021-07-23 VITALS
HEART RATE: 60 BPM | BODY MASS INDEX: 26.06 KG/M2 | HEIGHT: 68 IN | TEMPERATURE: 98 F | WEIGHT: 171.94 LBS | RESPIRATION RATE: 19 BRPM | DIASTOLIC BLOOD PRESSURE: 66 MMHG | OXYGEN SATURATION: 98 % | SYSTOLIC BLOOD PRESSURE: 118 MMHG

## 2021-07-23 DIAGNOSIS — E78.5 HYPERLIPIDEMIA, UNSPECIFIED HYPERLIPIDEMIA TYPE: ICD-10-CM

## 2021-07-23 DIAGNOSIS — I10 ESSENTIAL HYPERTENSION: ICD-10-CM

## 2021-07-23 DIAGNOSIS — Z00.00 ROUTINE MEDICAL EXAM: Primary | ICD-10-CM

## 2021-07-23 DIAGNOSIS — E11.9 TYPE 2 DIABETES MELLITUS WITHOUT COMPLICATION, WITHOUT LONG-TERM CURRENT USE OF INSULIN: ICD-10-CM

## 2021-07-23 PROCEDURE — 99396 PR PREVENTIVE VISIT,EST,40-64: ICD-10-PCS | Mod: S$GLB,,, | Performed by: INTERNAL MEDICINE

## 2021-07-23 PROCEDURE — 99999 PR PBB SHADOW E&M-EST. PATIENT-LVL III: CPT | Mod: PBBFAC,,, | Performed by: INTERNAL MEDICINE

## 2021-07-23 PROCEDURE — 82570 ASSAY OF URINE CREATININE: CPT | Performed by: INTERNAL MEDICINE

## 2021-07-23 PROCEDURE — 82043 UR ALBUMIN QUANTITATIVE: CPT | Performed by: INTERNAL MEDICINE

## 2021-07-23 PROCEDURE — 99999 PR PBB SHADOW E&M-EST. PATIENT-LVL III: ICD-10-PCS | Mod: PBBFAC,,, | Performed by: INTERNAL MEDICINE

## 2021-07-23 PROCEDURE — 99396 PREV VISIT EST AGE 40-64: CPT | Mod: S$GLB,,, | Performed by: INTERNAL MEDICINE

## 2021-07-23 RX ORDER — LOSARTAN POTASSIUM 50 MG/1
50 TABLET ORAL DAILY
Qty: 90 TABLET | Refills: 2 | Status: SHIPPED | OUTPATIENT
Start: 2021-07-23 | End: 2022-03-18

## 2021-07-23 RX ORDER — ATORVASTATIN CALCIUM 20 MG/1
20 TABLET, FILM COATED ORAL DAILY
Qty: 90 TABLET | Refills: 3 | Status: SHIPPED | OUTPATIENT
Start: 2021-07-23 | End: 2022-03-21 | Stop reason: SDUPTHER

## 2021-07-23 RX ORDER — GLYBURIDE 5 MG/1
5 TABLET ORAL 2 TIMES DAILY WITH MEALS
Qty: 180 TABLET | Refills: 2 | Status: SHIPPED | OUTPATIENT
Start: 2021-07-23 | End: 2022-06-08

## 2021-07-23 RX ORDER — AMLODIPINE BESYLATE 10 MG/1
10 TABLET ORAL NIGHTLY
Qty: 90 TABLET | Refills: 2 | Status: SHIPPED | OUTPATIENT
Start: 2021-07-23 | End: 2022-03-21 | Stop reason: SDUPTHER

## 2021-07-24 LAB
ALBUMIN/CREAT UR: 3.5 UG/MG (ref 0–30)
CREAT UR-MCNC: 200 MG/DL (ref 23–375)
MICROALBUMIN UR DL<=1MG/L-MCNC: 7 UG/ML

## 2022-03-13 DIAGNOSIS — E11.9 TYPE 2 DIABETES MELLITUS WITHOUT COMPLICATION: ICD-10-CM

## 2022-03-17 DIAGNOSIS — E11.8 DM (DIABETES MELLITUS), TYPE 2 WITH COMPLICATIONS: ICD-10-CM

## 2022-03-17 DIAGNOSIS — E78.5 HYPERLIPIDEMIA, UNSPECIFIED HYPERLIPIDEMIA TYPE: ICD-10-CM

## 2022-03-17 DIAGNOSIS — E11.9 TYPE 2 DIABETES MELLITUS WITHOUT COMPLICATION, WITHOUT LONG-TERM CURRENT USE OF INSULIN: ICD-10-CM

## 2022-03-17 DIAGNOSIS — I10 ESSENTIAL HYPERTENSION: ICD-10-CM

## 2022-03-18 RX ORDER — GLYBURIDE 5 MG/1
TABLET ORAL
Refills: 0 | OUTPATIENT
Start: 2022-03-18

## 2022-03-18 RX ORDER — ATORVASTATIN CALCIUM 20 MG/1
TABLET, FILM COATED ORAL
Refills: 0 | OUTPATIENT
Start: 2022-03-18

## 2022-03-18 RX ORDER — LOSARTAN POTASSIUM 50 MG/1
TABLET ORAL
Refills: 0 | OUTPATIENT
Start: 2022-03-18

## 2022-03-18 RX ORDER — METFORMIN HYDROCHLORIDE 1000 MG/1
TABLET ORAL
Refills: 0 | OUTPATIENT
Start: 2022-03-18

## 2022-03-18 RX ORDER — AMLODIPINE BESYLATE 10 MG/1
TABLET ORAL
Refills: 0 | OUTPATIENT
Start: 2022-03-18

## 2022-03-18 NOTE — TELEPHONE ENCOUNTER
[facility] Note  Quick DC. Inappropriate Request   Refill request requires further review by MD: NO   Medication Therapy Plan: Pharmacy is requesting new script(s) for the following medications without required information, (sig/ frequency/qty/etc)     [facility] action(s):  Quick Discontinue      Duplicate Pended Encounter(s)/ Last Prescribed Details:    Pharmacies have been requesting medications for patients without required information, (sig, frequency, qty, etc.). In addition, requests are sent for medication(s) pt. are currently not taking, and medications patients have never taken.    We have spoken to the pharmacies about these request types and advised their teams previously that we are unable to assess these New Script requests and require all details for these requests. This is a known issue and has been reported.        Medication related problems are not assessed for QDC.   Medication Reconciliation Completed? NO Were there pending details that required adjustment? NO     Automatic Epic Generated Protocol Data Below:   Requested Prescriptions   Pending Prescriptions Disp Refills    atorvastatin (LIPITOR) 20 MG tablet [Pharmacy Med Name: ATORVASTATIN TABS 20MG]  0              Appointments      Date Provider   Last Visit   7/23/2021 Danita Natarajan MD   Next Visit   3/17/2022 Danita Natarajan MD        Note composed:7:29 AM 03/18/2022

## 2022-03-18 NOTE — TELEPHONE ENCOUNTER
No new care gaps identified.  Powered by [employer]. Reference number: [id number].   3/17/2022 9:27:54 PM CDT

## 2022-03-18 NOTE — TELEPHONE ENCOUNTER
No new care gaps identified.  Powered by [employer] by [employer]. Reference number: [id number].   3/17/2022 9:28:36 PM CDT

## 2022-03-18 NOTE — TELEPHONE ENCOUNTER
[facility] Note  Quick DC. Inappropriate Request   Refill request requires further review by MD: NO   Medication Therapy Plan: Pharmacy is requesting new script(s) for the following medications without required information, (sig/ frequency/qty/etc)     [facility] action(s):  Quick Discontinue      Duplicate Pended Encounter(s)/ Last Prescribed Details:    Pharmacies have been requesting medications for patients without required information, (sig, frequency, qty, etc.). In addition, requests are sent for medication(s) pt. are currently not taking, and medications patients have never taken.    We have spoken to the pharmacies about these request types and advised their teams previously that we are unable to assess these New Script requests and require all details for these requests. This is a known issue and has been reported.        Medication related problems are not assessed for QDC.   Medication Reconciliation Completed? NO Were there pending details that required adjustment? NO     Automatic Epic Generated Protocol Data Below:   Requested Prescriptions   Pending Prescriptions Disp Refills    glyBURIDE (DIABETA) 5 MG tablet [Pharmacy Med Name: GLYBURIDE TABS 5MG]  0              Appointments      Date Provider   Last Visit   7/23/2021 Danita Natarajan MD   Next Visit   3/17/2022 Danita Natarajan MD        Note composed:7:29 AM 03/18/2022

## 2022-03-18 NOTE — TELEPHONE ENCOUNTER
[facility] Note  Quick DC. Inappropriate Request   Refill request requires further review by MD: NO   Medication Therapy Plan: Pharmacy is requesting new script(s) for the following medications without required information, (sig/ frequency/qty/etc)     [facility] action(s):  Quick Discontinue      Duplicate Pended Encounter(s)/ Last Prescribed Details:    Pharmacies have been requesting medications for patients without required information, (sig, frequency, qty, etc.). In addition, requests are sent for medication(s) pt. are currently not taking, and medications patients have never taken.    We have spoken to the pharmacies about these request types and advised their teams previously that we are unable to assess these New Script requests and require all details for these requests. This is a known issue and has been reported.        Medication related problems are not assessed for QDC.   Medication Reconciliation Completed? NO Were there pending details that required adjustment? NO     Automatic Epic Generated Protocol Data Below:   Requested Prescriptions   Pending Prescriptions Disp Refills    metFORMIN (GLUCOPHAGE) 1000 MG tablet [Pharmacy Med Name: METFORMIN HCL TABS 1000MG]  0              Appointments      Date Provider   Last Visit   7/23/2021 Danita Natarajan MD   Next Visit   3/17/2022 Danita Natarajan MD        Note composed:7:29 AM 03/18/2022

## 2022-03-18 NOTE — TELEPHONE ENCOUNTER
[facility] Note  Quick DC. Inappropriate Request   Refill request requires further review by MD: NO   Medication Therapy Plan: Pharmacy is requesting new script(s) for the following medications without required information, (sig/ frequency/qty/etc)     [facility] action(s):  Quick Discontinue      Duplicate Pended Encounter(s)/ Last Prescribed Details:    Pharmacies have been requesting medications for patients without required information, (sig, frequency, qty, etc.). In addition, requests are sent for medication(s) pt. are currently not taking, and medications patients have never taken.    We have spoken to the pharmacies about these request types and advised their teams previously that we are unable to assess these New Script requests and require all details for these requests. This is a known issue and has been reported.        Medication related problems are not assessed for QDC.   Medication Reconciliation Completed? NO Were there pending details that required adjustment? NO     Automatic Epic Generated Protocol Data Below:   Requested Prescriptions   Pending Prescriptions Disp Refills    losartan (COZAAR) 50 MG tablet [Pharmacy Med Name: LOSARTAN TABS 50MG]  0              Appointments      Date Provider   Last Visit   7/23/2021 Danita Natarajan MD   Next Visit   3/17/2022 Danita Natarajan MD        Note composed:7:29 AM 03/18/2022

## 2022-03-18 NOTE — TELEPHONE ENCOUNTER
No new care gaps identified.  Powered by [employer]. Reference number: [id number].   3/17/2022 9:29:52 PM CDT

## 2022-03-18 NOTE — TELEPHONE ENCOUNTER
[facility] Note  Quick DC. Inappropriate Request   Refill request requires further review by MD: NO   Medication Therapy Plan: Pharmacy is requesting new script(s) for the following medications without required information, (sig/ frequency/qty/etc)     [facility] action(s):  Quick Discontinue      Duplicate Pended Encounter(s)/ Last Prescribed Details:    Pharmacies have been requesting medications for patients without required information, (sig, frequency, qty, etc.). In addition, requests are sent for medication(s) pt. are currently not taking, and medications patients have never taken.    We have spoken to the pharmacies about these request types and advised their teams previously that we are unable to assess these New Script requests and require all details for these requests. This is a known issue and has been reported.        Medication related problems are not assessed for [facility]DC.   Medication Reconciliation Completed? NO Were there pending details that required adjustment? NO     Automatic Epic Generated Protocol Data Below:   Requested Prescriptions   Pending Prescriptions Disp Refills    amLODIPine (NORVASC) 10 MG tablet [Pharmacy Med Name: AMLODIPINE BESYLATE TABS 10MG]  0              Appointments      Date Provider   Last Visit   7/23/2021 Danita Natarajan MD   Next Visit   5/30/2022 Danita Natarajan MD        Note composed:7:29 AM 03/18/2022

## 2022-03-18 NOTE — TELEPHONE ENCOUNTER
No new care gaps identified.  Powered by [employer] by [employer]. Reference number: [id number].   3/17/2022 9:27:32 PM CDT

## 2022-03-18 NOTE — TELEPHONE ENCOUNTER
No new care gaps identified.  Powered by [employer]. Reference number: [id number].   3/17/2022 9:32:01 PM CDT

## 2022-03-21 DIAGNOSIS — E11.8 DM (DIABETES MELLITUS), TYPE 2 WITH COMPLICATIONS: ICD-10-CM

## 2022-03-21 DIAGNOSIS — E78.5 HYPERLIPIDEMIA, UNSPECIFIED HYPERLIPIDEMIA TYPE: ICD-10-CM

## 2022-03-21 DIAGNOSIS — I10 ESSENTIAL HYPERTENSION: ICD-10-CM

## 2022-03-21 NOTE — TELEPHONE ENCOUNTER
----- Message from Pam Patel sent at 3/21/2022 10:19 AM CDT -----  Contact: [facility] [phone]  Requesting an RX refill or new RX.  Is this a refill or new RX: New   RX name and strength (copy/paste from chart):  metFORMIN (GLUCOPHAGE) 1000 MG tablet  Is this a 30 day or 90 day RX: 90  Pharmacy name and phone # (copy/paste from chart):    [facility] - [address] [address]  Phone: [phone] Fax: [phone]    Ref # [id number]

## 2022-03-21 NOTE — TELEPHONE ENCOUNTER
No new care gaps identified.  Powered by [employer]. Reference number: [id number].   3/21/2022 10:41:17 AM CDT

## 2022-03-22 RX ORDER — METFORMIN HYDROCHLORIDE 1000 MG/1
1000 TABLET ORAL 2 TIMES DAILY WITH MEALS
Qty: 60 TABLET | Refills: 2 | Status: SHIPPED | OUTPATIENT
Start: 2022-03-22 | End: 2022-08-04 | Stop reason: SDUPTHER

## 2022-03-22 RX ORDER — ATORVASTATIN CALCIUM 20 MG/1
20 TABLET, FILM COATED ORAL DAILY
Qty: 90 TABLET | Refills: 0 | Status: SHIPPED | OUTPATIENT
Start: 2022-03-22 | End: 2022-08-04 | Stop reason: SDUPTHER

## 2022-03-22 RX ORDER — AMLODIPINE BESYLATE 10 MG/1
10 TABLET ORAL NIGHTLY
Qty: 90 TABLET | Refills: 0 | Status: SHIPPED | OUTPATIENT
Start: 2022-03-22 | End: 2022-08-04 | Stop reason: SDUPTHER

## 2022-06-08 DIAGNOSIS — E11.9 TYPE 2 DIABETES MELLITUS WITHOUT COMPLICATION, WITHOUT LONG-TERM CURRENT USE OF INSULIN: ICD-10-CM

## 2022-06-08 NOTE — TELEPHONE ENCOUNTER
Care Due:                  Date            Visit Type   Department     Provider  --------------------------------------------------------------------------------                                EP -                              PRIMARY      [facility] PR[facility]   Danita Adams  Last Visit: 07-      CARE ([facility])   CARE           [place]                              [facility]                              [facility]  Next Visit: 08-      CARE ([facility])   CARE           [place]                                                            Last  Test          Frequency    Reason                     Performed    Due Date  --------------------------------------------------------------------------------    CMP.........  12 months..  atorvastatin, glyBURIDE,   07-   07-                             losartan, metFORMIN......    HBA1C.......  6 months...  glyBURIDE, metFORMIN.....  07- 01-    Lipid Panel.  12 months..  atorvastatin.............  07-   07-    [facility] Embedded Care Gaps. Reference number: [id number]. 6/08/2022   12:00:39 PM CDT

## 2022-06-09 ENCOUNTER — TELEPHONE (OUTPATIENT)
Dept: PRIMARY CARE CLINIC | Facility: CLINIC | Age: 62
End: 2022-06-09
Payer: COMMERCIAL

## 2022-06-09 DIAGNOSIS — E11.9 TYPE 2 DIABETES MELLITUS WITHOUT COMPLICATION, WITHOUT LONG-TERM CURRENT USE OF INSULIN: ICD-10-CM

## 2022-06-09 DIAGNOSIS — Z00.00 ROUTINE MEDICAL EXAM: Primary | ICD-10-CM

## 2022-06-09 DIAGNOSIS — Z12.5 PROSTATE CANCER SCREENING: ICD-10-CM

## 2022-06-09 RX ORDER — GLYBURIDE 5 MG/1
5 TABLET ORAL 2 TIMES DAILY WITH MEALS
Qty: 180 TABLET | Refills: 0 | Status: SHIPPED | OUTPATIENT
Start: 2022-06-09 | End: 2022-08-04 | Stop reason: DRUGHIGH

## 2022-06-09 NOTE — TELEPHONE ENCOUNTER
Refill Routing Note   Medication(s) are not appropriate for processing by [facility] for the following reason(s):      - Required laboratory values are outdated    [facility] action(s):  Defer          Medication reconciliation completed: No     Appointments  past 12m or future 3m with PCP    Date Provider   Last Visit   7/23/2021 Danita Natarajan MD   Next Visit   8/4/2022 Danita Natarajan MD   ED visits in past 90 days: 0        Note composed:7:02 PM 06/08/2022

## 2022-07-22 ENCOUNTER — LAB VISIT (OUTPATIENT)
Dept: LAB | Facility: HOSPITAL | Age: 62
End: 2022-07-22
Attending: INTERNAL MEDICINE
Payer: COMMERCIAL

## 2022-07-22 DIAGNOSIS — E11.9 TYPE 2 DIABETES MELLITUS WITHOUT COMPLICATION, WITHOUT LONG-TERM CURRENT USE OF INSULIN: ICD-10-CM

## 2022-07-22 DIAGNOSIS — Z00.00 ROUTINE MEDICAL EXAM: ICD-10-CM

## 2022-07-22 DIAGNOSIS — Z12.5 PROSTATE CANCER SCREENING: ICD-10-CM

## 2022-07-22 LAB
ALBUMIN SERPL BCP-MCNC: 3.8 G/DL (ref 3.5–5.2)
ALP SERPL-CCNC: 121 U/L (ref 55–135)
ALT SERPL W/O P-5'-P-CCNC: 25 U/L (ref 10–44)
ANION GAP SERPL CALC-SCNC: 7 MMOL/L (ref 8–16)
AST SERPL-CCNC: 17 U/L (ref 10–40)
BILIRUB SERPL-MCNC: 0.6 MG/DL (ref 0.1–1)
BUN SERPL-MCNC: 11 MG/DL (ref 8–23)
CALCIUM SERPL-MCNC: 9.3 MG/DL (ref 8.7–10.5)
CHLORIDE SERPL-SCNC: 105 MMOL/L (ref 95–110)
CHOLEST SERPL-MCNC: 112 MG/DL (ref 120–199)
CHOLEST/HDLC SERPL: 2.4 {RATIO} (ref 2–5)
CO2 SERPL-SCNC: 29 MMOL/L (ref 23–29)
COMPLEXED PSA SERPL-MCNC: 1.7 NG/ML (ref 0–4)
CREAT SERPL-MCNC: 1.1 MG/DL (ref 0.5–1.4)
ERYTHROCYTE [DISTWIDTH] IN BLOOD BY AUTOMATED COUNT: 13.1 % (ref 11.5–14.5)
EST. GFR  (AFRICAN AMERICAN): >60 ML/MIN/1.73 M^2
EST. GFR  (NON AFRICAN AMERICAN): >60 ML/MIN/1.73 M^2
ESTIMATED AVG GLUCOSE: 177 MG/DL (ref 68–131)
GLUCOSE SERPL-MCNC: 150 MG/DL (ref 70–110)
HBA1C MFR BLD: 7.8 % (ref 4–5.6)
HCT VFR BLD AUTO: 40.6 % (ref 40–54)
HDLC SERPL-MCNC: 47 MG/DL (ref 40–75)
HDLC SERPL: 42 % (ref 20–50)
HGB BLD-MCNC: 12.8 G/DL (ref 14–18)
LDLC SERPL CALC-MCNC: 57.2 MG/DL (ref 63–159)
MCH RBC QN AUTO: 28.4 PG (ref 27–31)
MCHC RBC AUTO-ENTMCNC: 31.5 G/DL (ref 32–36)
MCV RBC AUTO: 90 FL (ref 82–98)
NONHDLC SERPL-MCNC: 65 MG/DL
PLATELET # BLD AUTO: 242 K/UL (ref 150–450)
PMV BLD AUTO: 11.4 FL (ref 9.2–12.9)
POTASSIUM SERPL-SCNC: 3.8 MMOL/L (ref 3.5–5.1)
PROT SERPL-MCNC: 6.9 G/DL (ref 6–8.4)
RBC # BLD AUTO: 4.51 M/UL (ref 4.6–6.2)
SODIUM SERPL-SCNC: 141 MMOL/L (ref 136–145)
TRIGL SERPL-MCNC: 39 MG/DL (ref 30–150)
WBC # BLD AUTO: 4.9 K/UL (ref 3.9–12.7)

## 2022-07-22 PROCEDURE — 80053 COMPREHEN METABOLIC PANEL: CPT | Performed by: INTERNAL MEDICINE

## 2022-07-22 PROCEDURE — 85027 COMPLETE CBC AUTOMATED: CPT | Performed by: INTERNAL MEDICINE

## 2022-07-22 PROCEDURE — 84153 ASSAY OF PSA TOTAL: CPT | Performed by: INTERNAL MEDICINE

## 2022-07-22 PROCEDURE — 80061 LIPID PANEL: CPT | Performed by: INTERNAL MEDICINE

## 2022-07-22 PROCEDURE — 83036 HEMOGLOBIN GLYCOSYLATED A1C: CPT | Performed by: INTERNAL MEDICINE

## 2022-07-22 PROCEDURE — 36415 COLL VENOUS BLD VENIPUNCTURE: CPT | Mod: PN | Performed by: INTERNAL MEDICINE

## 2022-08-04 ENCOUNTER — OFFICE VISIT (OUTPATIENT)
Dept: PRIMARY CARE CLINIC | Facility: CLINIC | Age: 62
End: 2022-08-04
Payer: COMMERCIAL

## 2022-08-04 VITALS
OXYGEN SATURATION: 99 % | HEART RATE: 94 BPM | DIASTOLIC BLOOD PRESSURE: 62 MMHG | HEIGHT: 68 IN | WEIGHT: 172.63 LBS | SYSTOLIC BLOOD PRESSURE: 112 MMHG | BODY MASS INDEX: 26.16 KG/M2 | TEMPERATURE: 99 F

## 2022-08-04 DIAGNOSIS — Z23 NEED FOR COVID-19 VACCINE: ICD-10-CM

## 2022-08-04 DIAGNOSIS — I10 ESSENTIAL HYPERTENSION: ICD-10-CM

## 2022-08-04 DIAGNOSIS — E11.9 TYPE 2 DIABETES MELLITUS WITHOUT COMPLICATION, WITHOUT LONG-TERM CURRENT USE OF INSULIN: ICD-10-CM

## 2022-08-04 DIAGNOSIS — Z00.00 ROUTINE MEDICAL EXAM: Primary | ICD-10-CM

## 2022-08-04 DIAGNOSIS — E78.5 HYPERLIPIDEMIA, UNSPECIFIED HYPERLIPIDEMIA TYPE: ICD-10-CM

## 2022-08-04 PROCEDURE — 3008F PR BODY MASS INDEX (BMI) DOCUMENTED: ICD-10-PCS | Mod: CPTII,S$GLB,, | Performed by: INTERNAL MEDICINE

## 2022-08-04 PROCEDURE — 1160F PR REVIEW ALL MEDS BY PRESCRIBER/CLIN PHARMACIST DOCUMENTED: ICD-10-PCS | Mod: CPTII,S$GLB,, | Performed by: INTERNAL MEDICINE

## 2022-08-04 PROCEDURE — 3061F PR NEG MICROALBUMINURIA RESULT DOCUMENTED/REVIEW: ICD-10-PCS | Mod: CPTII,S$GLB,, | Performed by: INTERNAL MEDICINE

## 2022-08-04 PROCEDURE — 3066F PR DOCUMENTATION OF TREATMENT FOR NEPHROPATHY: ICD-10-PCS | Mod: CPTII,S$GLB,, | Performed by: INTERNAL MEDICINE

## 2022-08-04 PROCEDURE — 3074F SYST BP LT 130 MM HG: CPT | Mod: CPTII,S$GLB,, | Performed by: INTERNAL MEDICINE

## 2022-08-04 PROCEDURE — 3072F PR LOW RISK FOR RETINOPATHY: ICD-10-PCS | Mod: CPTII,S$GLB,, | Performed by: INTERNAL MEDICINE

## 2022-08-04 PROCEDURE — 3074F PR MOST RECENT SYSTOLIC BLOOD PRESSURE < 130 MM HG: ICD-10-PCS | Mod: CPTII,S$GLB,, | Performed by: INTERNAL MEDICINE

## 2022-08-04 PROCEDURE — 4010F PR ACE/ARB THEARPY RXD/TAKEN: ICD-10-PCS | Mod: CPTII,S$GLB,, | Performed by: INTERNAL MEDICINE

## 2022-08-04 PROCEDURE — 1160F RVW MEDS BY RX/DR IN RCRD: CPT | Mod: CPTII,S$GLB,, | Performed by: INTERNAL MEDICINE

## 2022-08-04 PROCEDURE — 4010F ACE/ARB THERAPY RXD/TAKEN: CPT | Mod: CPTII,S$GLB,, | Performed by: INTERNAL MEDICINE

## 2022-08-04 PROCEDURE — 3008F BODY MASS INDEX DOCD: CPT | Mod: CPTII,S$GLB,, | Performed by: INTERNAL MEDICINE

## 2022-08-04 PROCEDURE — 3061F NEG MICROALBUMINURIA REV: CPT | Mod: CPTII,S$GLB,, | Performed by: INTERNAL MEDICINE

## 2022-08-04 PROCEDURE — 99396 PREV VISIT EST AGE 40-64: CPT | Mod: S$GLB,,, | Performed by: INTERNAL MEDICINE

## 2022-08-04 PROCEDURE — 99999 PR PBB SHADOW E&M-EST. PATIENT-LVL III: ICD-10-PCS | Mod: PBBFAC,,, | Performed by: INTERNAL MEDICINE

## 2022-08-04 PROCEDURE — 3078F DIAST BP <80 MM HG: CPT | Mod: CPTII,S$GLB,, | Performed by: INTERNAL MEDICINE

## 2022-08-04 PROCEDURE — 3078F PR MOST RECENT DIASTOLIC BLOOD PRESSURE < 80 MM HG: ICD-10-PCS | Mod: CPTII,S$GLB,, | Performed by: INTERNAL MEDICINE

## 2022-08-04 PROCEDURE — 3051F PR MOST RECENT HEMOGLOBIN A1C LEVEL 7.0 - < 8.0%: ICD-10-PCS | Mod: CPTII,S$GLB,, | Performed by: INTERNAL MEDICINE

## 2022-08-04 PROCEDURE — 99396 PR PREVENTIVE VISIT,EST,40-64: ICD-10-PCS | Mod: S$GLB,,, | Performed by: INTERNAL MEDICINE

## 2022-08-04 PROCEDURE — 3072F LOW RISK FOR RETINOPATHY: CPT | Mod: CPTII,S$GLB,, | Performed by: INTERNAL MEDICINE

## 2022-08-04 PROCEDURE — 3051F HG A1C>EQUAL 7.0%<8.0%: CPT | Mod: CPTII,S$GLB,, | Performed by: INTERNAL MEDICINE

## 2022-08-04 PROCEDURE — 3066F NEPHROPATHY DOC TX: CPT | Mod: CPTII,S$GLB,, | Performed by: INTERNAL MEDICINE

## 2022-08-04 PROCEDURE — 1159F PR MEDICATION LIST DOCUMENTED IN MEDICAL RECORD: ICD-10-PCS | Mod: CPTII,S$GLB,, | Performed by: INTERNAL MEDICINE

## 2022-08-04 PROCEDURE — 99999 PR PBB SHADOW E&M-EST. PATIENT-LVL III: CPT | Mod: PBBFAC,,, | Performed by: INTERNAL MEDICINE

## 2022-08-04 PROCEDURE — 1159F MED LIST DOCD IN RCRD: CPT | Mod: CPTII,S$GLB,, | Performed by: INTERNAL MEDICINE

## 2022-08-04 RX ORDER — ATORVASTATIN CALCIUM 20 MG/1
20 TABLET, FILM COATED ORAL DAILY
Qty: 30 TABLET | Refills: 6 | Status: SHIPPED | OUTPATIENT
Start: 2022-08-04 | End: 2023-02-03 | Stop reason: SDUPTHER

## 2022-08-04 RX ORDER — GLYBURIDE 5 MG/1
TABLET ORAL
Qty: 90 TABLET | Refills: 5 | Status: SHIPPED | OUTPATIENT
Start: 2022-08-04 | End: 2022-11-07 | Stop reason: DRUGHIGH

## 2022-08-04 RX ORDER — AMLODIPINE BESYLATE 10 MG/1
10 TABLET ORAL NIGHTLY
Qty: 30 TABLET | Refills: 6 | Status: SHIPPED | OUTPATIENT
Start: 2022-08-04 | End: 2023-02-03 | Stop reason: SDUPTHER

## 2022-08-04 RX ORDER — LOSARTAN POTASSIUM 50 MG/1
50 TABLET ORAL DAILY
Qty: 30 TABLET | Refills: 6 | Status: SHIPPED | OUTPATIENT
Start: 2022-08-04 | End: 2023-02-03 | Stop reason: SDUPTHER

## 2022-08-04 RX ORDER — LANCETS
1 EACH MISCELLANEOUS DAILY
Qty: 100 EACH | Refills: 3 | Status: SHIPPED | OUTPATIENT
Start: 2022-08-04

## 2022-08-04 RX ORDER — INSULIN PUMP SYRINGE, 3 ML
EACH MISCELLANEOUS
Qty: 1 EACH | Refills: 0 | Status: SHIPPED | OUTPATIENT
Start: 2022-08-04 | End: 2022-10-06

## 2022-08-04 RX ORDER — METFORMIN HYDROCHLORIDE 1000 MG/1
1000 TABLET ORAL 2 TIMES DAILY WITH MEALS
Qty: 60 TABLET | Refills: 6 | Status: SHIPPED | OUTPATIENT
Start: 2022-08-04 | End: 2023-02-03 | Stop reason: SDUPTHER

## 2022-08-04 NOTE — PROGRESS NOTES
Subjective:       Patient ID: Parth Lund is a 62 y.o. male.    Chief Complaint: Annual Exam and Medication Refill    Last seen one year ago with diabetes controlled. Returns for annual physical and follow up later than planned. Blood sugar is running higher, recent labs detailed below. Reports measurements in upper 100's at home, typically done in the evening hours, only about twice a week (no log). Feeling well, no complaints. Reports compliance with daily meds as prescribed, no adverse effects.     PMH:   Hypertension.   Diabetes type 2, HbA1c 6.9% July '21.  Hyperlipidemia, LDL 60 July '21.   Erectile Dysfunction.    PSH: None.     Colonoscopy normal 4/14. Eye exam 4/21. Flu shot 11/19. Tdap 2014. Pneumovax 2014. Prevnar 2015. Shingrix 7/20, 9/20. COVID (J&J) 7/21, no booster.    Social: Non-smoker, social alcohol. , 4 children.  for [employer].     FMH: HTN, DM, Chol, Stroke, Heart dis, Throat cancer.     NKDA.     Medications: list reviewed and reconciled.     Review of Systems   Constitutional: Negative for activity change, appetite change, chills, fatigue, fever and unexpected weight change.   HENT: Negative for nasal congestion, ear pain, hearing loss, postnasal drip, rhinorrhea, sore throat, trouble swallowing and voice change.    Eyes: Negative for pain and visual disturbance.   Respiratory: Negative for apnea, cough, chest tightness, shortness of breath and wheezing.    Cardiovascular: Negative for chest pain, palpitations and leg swelling.   Gastrointestinal: Negative for abdominal pain, blood in stool, constipation, diarrhea, nausea and vomiting.   Genitourinary: Negative for difficulty urinating, dysuria, frequency, hematuria, scrotal swelling and urgency.   Musculoskeletal: Negative for arthralgias, back pain, gait problem, myalgias, neck pain and neck stiffness.        Mild left knee discomfort, it does not swell, no medications used.   Integumentary:  Negative for  "color change and rash.   Neurological: Negative for dizziness, seizures, syncope, weakness, numbness and headaches.   Hematological: Negative for adenopathy. Does not bruise/bleed easily.   Psychiatric/Behavioral: Negative for agitation, dysphoric mood and sleep disturbance. The patient is not nervous/anxious.          Objective:    /62, Pulse 94, Temp 98.5, O2 Sat 99%, Ht 5' 8", Wt 172.6 lbs (stable), BMI=26  Physical Exam  Constitutional:       General: He is not in acute distress.     Appearance: Normal appearance. He is well-developed. He is not diaphoretic.   HENT:      Head: Normocephalic and atraumatic.      Right Ear: Tympanic membrane, ear canal and external ear normal.      Left Ear: Tympanic membrane, ear canal and external ear normal.      Nose: Nose normal.      Mouth/Throat:      Mouth: Mucous membranes are moist.   Eyes:      General: No scleral icterus.     Extraocular Movements: Extraocular movements intact.      Conjunctiva/sclera: Conjunctivae normal.      Pupils: Pupils are equal, round, and reactive to light.   Neck:      Thyroid: No thyromegaly.      Vascular: No carotid bruit or JVD.   Cardiovascular:      Rate and Rhythm: Normal rate and regular rhythm.      Pulses: Normal pulses.      Heart sounds: Normal heart sounds. No murmur heard.    No friction rub. No gallop.   Pulmonary:      Effort: Pulmonary effort is normal. No respiratory distress.      Breath sounds: Normal breath sounds. No wheezing, rhonchi or rales.   Abdominal:      General: Bowel sounds are normal. There is no distension.      Palpations: Abdomen is soft. There is no mass.      Tenderness: There is no abdominal tenderness.      Hernia: No hernia is present.   Musculoskeletal:         General: No tenderness or deformity. Normal range of motion.      Cervical back: Normal range of motion and neck supple.      Right lower leg: No edema.      Left lower leg: No edema.      Comments: Protective Sensation:  Right: " Intact  Left: Intact    Visual Inspection:  Normal -  Bilateral    Pedal Pulses:   Right: Present  Left: Present    Posterior tibialis:   Right:Present  Left: Present     Lymphadenopathy:      Cervical: No cervical adenopathy.   Skin:     General: Skin is warm and dry.      Findings: No rash.   Neurological:      General: No focal deficit present.      Mental Status: He is alert and oriented to person, place, and time.      Cranial Nerves: No cranial nerve deficit.      Motor: No weakness, atrophy or abnormal muscle tone.      Coordination: Coordination normal.      Gait: Gait normal.   Psychiatric:         Attention and Perception: Attention normal.         Mood and Affect: Mood normal.         Speech: Speech normal.         Behavior: Behavior normal.         Thought Content: Thought content normal.         Judgment: Judgment normal.         Lab Visit on 07/22/2022   Component Date Value    WBC 07/22/2022 4.90     RBC 07/22/2022 4.51 (A)    Hemoglobin 07/22/2022 12.8 (A)    Hematocrit 07/22/2022 40.6     MCV 07/22/2022 90     MCH 07/22/2022 28.4     MCHC 07/22/2022 31.5 (A)    RDW 07/22/2022 13.1     Platelets 07/22/2022 242     MPV 07/22/2022 11.4     Sodium 07/22/2022 141     Potassium 07/22/2022 3.8     Chloride 07/22/2022 105     CO2 07/22/2022 29     Glucose 07/22/2022 150 (A)    BUN 07/22/2022 11     Creatinine 07/22/2022 1.1     Calcium 07/22/2022 9.3     Total Protein 07/22/2022 6.9     Albumin 07/22/2022 3.8     Total Bilirubin 07/22/2022 0.6     Alkaline Phosphatase 07/22/2022 121     AST 07/22/2022 17     ALT 07/22/2022 25     Anion Gap 07/22/2022 7 (A)    eGFR if African American 07/22/2022 >60.0     eGFR if non African Amer* 07/22/2022 >60.0     Cholesterol 07/22/2022 112 (A)    Triglycerides 07/22/2022 39     HDL 07/22/2022 47     LDL Cholesterol 07/22/2022 57.2 (A)    HDL/Cholesterol Ratio 07/22/2022 42.0     Total Cholesterol/HDL Ra* 07/22/2022 2.4     Non-HDL  Cholesterol 07/22/2022 65     Hemoglobin A1C 07/22/2022 7.8 (A)    Estimated Avg Glucose 07/22/2022 177 (A)    PSA, Screen 07/22/2022 1.7    Lab Visit on 07/22/2022   Component Date Value    Microalbumin, Urine 07/22/2022 12.0     Creatinine, Urine 07/22/2022 182.0     Microalb/Creat Ratio 07/22/2022 6.6       Assessment:       Problem List Items Addressed This Visit     HLD (hyperlipidemia)      Other Visit Diagnoses     Routine medical exam    -  Primary    Type 2 diabetes mellitus without complication, without long-term current use of insulin        Essential hypertension              Plan:       Routine medical exam    Type 2 diabetes mellitus without complication, without long-term current use of insulin  -     Increase glyBURIDE (DIABETA) 5 MG tablet; Take two tabs (10mg) po every am, one tab (5mg) every pm.  Dispense: 90 tablet; Refill: 5  -     Glucose, Random; Future - in 3 months.  -     Hemoglobin A1C; Future - in 3 months.  -     Continue metFORMIN (GLUCOPHAGE) 1000 MG tablet; Take 1 tablet (1,000 mg total) by mouth 2 (two) times daily with meals.  Dispense: 60 tablet; Refill: 6  -     blood sugar diagnostic Strp; 1 each by Misc.(Non-Drug; Combo Route) route once daily.  Dispense: 100 each; Refill: 3  -     lancets Misc; 1 each by Misc.(Non-Drug; Combo Route) route once daily.  Dispense: 100 each; Refill: 3  -     blood-glucose meter kit; Use as instructed  Dispense: 1 each; Refill: 0  -     Eye exam to be scheduled.    Essential hypertension well controlled, continue same.  -     losartan (COZAAR) 50 MG tablet; Take 1 tablet (50 mg total) by mouth once daily.  Dispense: 30 tablet; Refill: 6  -     amLODIPine (NORVASC) 10 MG tablet; Take 1 tablet (10 mg total) by mouth every evening.  Dispense: 30 tablet; Refill: 6    Hyperlipidemia, unspecified hyperlipidemia type at goal, continue same.  -     atorvastatin (LIPITOR) 20 MG tablet; Take 1 tablet (20 mg total) by mouth once daily.  Dispense: 30  tablet; Refill: 6    Need for COVID-19 vaccine - booster dose of mRNA vaccine today.

## 2022-09-27 DIAGNOSIS — E11.9 TYPE 2 DIABETES MELLITUS WITHOUT COMPLICATION, WITHOUT LONG-TERM CURRENT USE OF INSULIN: ICD-10-CM

## 2022-09-27 RX ORDER — GLYBURIDE 5 MG/1
TABLET ORAL
Qty: 180 TABLET | Refills: 1 | OUTPATIENT
Start: 2022-09-27

## 2022-09-27 NOTE — TELEPHONE ENCOUNTER
No new care gaps identified.  [facility] Embedded Care Gaps. Reference number: [id number]. 9/27/2022   12:21:32 AM CDT

## 2022-09-27 NOTE — TELEPHONE ENCOUNTER
Refill Routing Note   Medication(s) are not appropriate for processing by [facility] for the following reason(s):      - Medication is a new start (<3 months)    [facility] action(s):  Defer          Medication reconciliation completed: No     Appointments  past 12m or future 3m with PCP    Date Provider   Last Visit   8/4/2022 Danita Natarajan MD   Next Visit   2/3/2023 Danita Natarajan MD   ED visits in past 90 days: 0        Note composed:4:18 AM 09/27/2022

## 2022-11-04 ENCOUNTER — LAB VISIT (OUTPATIENT)
Dept: LAB | Facility: HOSPITAL | Age: 62
End: 2022-11-04
Attending: INTERNAL MEDICINE
Payer: COMMERCIAL

## 2022-11-04 DIAGNOSIS — E11.9 TYPE 2 DIABETES MELLITUS WITHOUT COMPLICATION, WITHOUT LONG-TERM CURRENT USE OF INSULIN: ICD-10-CM

## 2022-11-04 LAB
ESTIMATED AVG GLUCOSE: 166 MG/DL (ref 68–131)
GLUCOSE SERPL-MCNC: 151 MG/DL (ref 70–110)
HBA1C MFR BLD: 7.4 % (ref 4–5.6)

## 2022-11-04 PROCEDURE — 83036 HEMOGLOBIN GLYCOSYLATED A1C: CPT | Performed by: INTERNAL MEDICINE

## 2022-11-04 PROCEDURE — 82947 ASSAY GLUCOSE BLOOD QUANT: CPT | Performed by: INTERNAL MEDICINE

## 2022-11-04 PROCEDURE — 36415 COLL VENOUS BLD VENIPUNCTURE: CPT | Mod: PN | Performed by: INTERNAL MEDICINE

## 2022-11-07 ENCOUNTER — TELEPHONE (OUTPATIENT)
Dept: PRIMARY CARE CLINIC | Facility: CLINIC | Age: 62
End: 2022-11-07
Payer: COMMERCIAL

## 2022-11-07 DIAGNOSIS — E11.9 TYPE 2 DIABETES MELLITUS WITHOUT COMPLICATION, WITHOUT LONG-TERM CURRENT USE OF INSULIN: ICD-10-CM

## 2022-11-07 RX ORDER — GLYBURIDE 5 MG/1
10 TABLET ORAL 2 TIMES DAILY WITH MEALS
Qty: 120 TABLET | Refills: 5 | Status: SHIPPED | OUTPATIENT
Start: 2022-11-07 | End: 2023-03-15

## 2022-11-07 NOTE — TELEPHONE ENCOUNTER
Hemoglobin A1c is down from 7.8 to 7.4, better, but not to goal (6.5). Increase Glyburide 5mg to two tablets twice a day - new order sent to [facility]. Follow up as scheduled in February.

## 2022-12-06 ENCOUNTER — OFFICE VISIT (OUTPATIENT)
Dept: OPTOMETRY | Facility: CLINIC | Age: 62
End: 2022-12-06
Payer: COMMERCIAL

## 2022-12-06 DIAGNOSIS — H25.13 NUCLEAR SCLEROSIS OF BOTH EYES: ICD-10-CM

## 2022-12-06 DIAGNOSIS — E11.9 TYPE 2 DIABETES MELLITUS WITHOUT RETINOPATHY: Primary | ICD-10-CM

## 2022-12-06 DIAGNOSIS — H52.4 PRESBYOPIA: ICD-10-CM

## 2022-12-06 PROCEDURE — 92015 DETERMINE REFRACTIVE STATE: CPT | Mod: S$GLB,,, | Performed by: OPTOMETRIST

## 2022-12-06 PROCEDURE — 92015 PR REFRACTION: ICD-10-PCS | Mod: S$GLB,,, | Performed by: OPTOMETRIST

## 2022-12-06 PROCEDURE — 3061F PR NEG MICROALBUMINURIA RESULT DOCUMENTED/REVIEW: ICD-10-PCS | Mod: CPTII,S$GLB,, | Performed by: OPTOMETRIST

## 2022-12-06 PROCEDURE — 3061F NEG MICROALBUMINURIA REV: CPT | Mod: CPTII,S$GLB,, | Performed by: OPTOMETRIST

## 2022-12-06 PROCEDURE — 99999 PR PBB SHADOW E&M-EST. PATIENT-LVL III: ICD-10-PCS | Mod: PBBFAC,,, | Performed by: OPTOMETRIST

## 2022-12-06 PROCEDURE — 2023F DILAT RTA XM W/O RTNOPTHY: CPT | Mod: CPTII,S$GLB,, | Performed by: OPTOMETRIST

## 2022-12-06 PROCEDURE — 3066F PR DOCUMENTATION OF TREATMENT FOR NEPHROPATHY: ICD-10-PCS | Mod: CPTII,S$GLB,, | Performed by: OPTOMETRIST

## 2022-12-06 PROCEDURE — 92014 PR EYE EXAM, EST PATIENT,COMPREHESV: ICD-10-PCS | Mod: S$GLB,,, | Performed by: OPTOMETRIST

## 2022-12-06 PROCEDURE — 1159F MED LIST DOCD IN RCRD: CPT | Mod: CPTII,S$GLB,, | Performed by: OPTOMETRIST

## 2022-12-06 PROCEDURE — 92014 COMPRE OPH EXAM EST PT 1/>: CPT | Mod: S$GLB,,, | Performed by: OPTOMETRIST

## 2022-12-06 PROCEDURE — 3051F HG A1C>EQUAL 7.0%<8.0%: CPT | Mod: CPTII,S$GLB,, | Performed by: OPTOMETRIST

## 2022-12-06 PROCEDURE — 3066F NEPHROPATHY DOC TX: CPT | Mod: CPTII,S$GLB,, | Performed by: OPTOMETRIST

## 2022-12-06 PROCEDURE — 4010F ACE/ARB THERAPY RXD/TAKEN: CPT | Mod: CPTII,S$GLB,, | Performed by: OPTOMETRIST

## 2022-12-06 PROCEDURE — 1159F PR MEDICATION LIST DOCUMENTED IN MEDICAL RECORD: ICD-10-PCS | Mod: CPTII,S$GLB,, | Performed by: OPTOMETRIST

## 2022-12-06 PROCEDURE — 2023F PR DILATED RETINAL EXAM W/O EVID OF RETINOPATHY: ICD-10-PCS | Mod: CPTII,S$GLB,, | Performed by: OPTOMETRIST

## 2022-12-06 PROCEDURE — 99999 PR PBB SHADOW E&M-EST. PATIENT-LVL III: CPT | Mod: PBBFAC,,, | Performed by: OPTOMETRIST

## 2022-12-06 PROCEDURE — 4010F PR ACE/ARB THEARPY RXD/TAKEN: ICD-10-PCS | Mod: CPTII,S$GLB,, | Performed by: OPTOMETRIST

## 2022-12-06 PROCEDURE — 3051F PR MOST RECENT HEMOGLOBIN A1C LEVEL 7.0 - < 8.0%: ICD-10-PCS | Mod: CPTII,S$GLB,, | Performed by: OPTOMETRIST

## 2022-12-06 NOTE — PROGRESS NOTES
HPI    Last eye exam was 4/5/21 with Dr. Capellan.  Patient states no vision changes since last exam.  Patient denies diplopia, headaches, flashes/floaters, and pain.    Hemoglobin A1C       Date                     Value               Ref Range             Status                11/04/2022               7.4 (H)             4.0 - 5.6 %           Final                Last edited by Norma Lowry MA on 12/6/2022  3:07 PM.            Assessment /Plan     For exam results, see Encounter Report.    Type 2 diabetes mellitus without retinopathy    Nuclear sclerosis of both eyes    Presbyopia          No retinopathy--monitor yearly.  BS control.  Eye health normal OU.  Educated on cataracts and affects on vision.  Monitor.  Bifocal rx given.  Retina flat and intact OU--no holes, tears, breaks, or RDs.

## 2023-02-03 ENCOUNTER — LAB VISIT (OUTPATIENT)
Dept: LAB | Facility: HOSPITAL | Age: 63
End: 2023-02-03
Attending: INTERNAL MEDICINE
Payer: COMMERCIAL

## 2023-02-03 ENCOUNTER — OFFICE VISIT (OUTPATIENT)
Dept: PRIMARY CARE CLINIC | Facility: CLINIC | Age: 63
End: 2023-02-03
Payer: COMMERCIAL

## 2023-02-03 VITALS
BODY MASS INDEX: 27.03 KG/M2 | OXYGEN SATURATION: 97 % | HEART RATE: 57 BPM | WEIGHT: 178.38 LBS | HEIGHT: 68 IN | SYSTOLIC BLOOD PRESSURE: 138 MMHG | TEMPERATURE: 98 F | DIASTOLIC BLOOD PRESSURE: 80 MMHG

## 2023-02-03 DIAGNOSIS — I10 ESSENTIAL HYPERTENSION: ICD-10-CM

## 2023-02-03 DIAGNOSIS — E78.5 HYPERLIPIDEMIA, UNSPECIFIED HYPERLIPIDEMIA TYPE: ICD-10-CM

## 2023-02-03 DIAGNOSIS — Z23 NEED FOR COVID-19 VACCINE: ICD-10-CM

## 2023-02-03 DIAGNOSIS — Z23 NEED FOR PROPHYLACTIC VACCINATION AND INOCULATION AGAINST INFLUENZA: ICD-10-CM

## 2023-02-03 DIAGNOSIS — E11.9 TYPE 2 DIABETES MELLITUS WITHOUT COMPLICATION, WITHOUT LONG-TERM CURRENT USE OF INSULIN: ICD-10-CM

## 2023-02-03 DIAGNOSIS — Z23 INFLUENZA VACCINE NEEDED: ICD-10-CM

## 2023-02-03 DIAGNOSIS — E11.9 TYPE 2 DIABETES MELLITUS WITHOUT COMPLICATION, WITHOUT LONG-TERM CURRENT USE OF INSULIN: Primary | ICD-10-CM

## 2023-02-03 LAB
ANION GAP SERPL CALC-SCNC: 9 MMOL/L (ref 8–16)
BUN SERPL-MCNC: 15 MG/DL (ref 8–23)
CALCIUM SERPL-MCNC: 9.8 MG/DL (ref 8.7–10.5)
CHLORIDE SERPL-SCNC: 103 MMOL/L (ref 95–110)
CO2 SERPL-SCNC: 28 MMOL/L (ref 23–29)
CREAT SERPL-MCNC: 1.1 MG/DL (ref 0.5–1.4)
EST. GFR  (NO RACE VARIABLE): >60 ML/MIN/1.73 M^2
ESTIMATED AVG GLUCOSE: 214 MG/DL (ref 68–131)
GLUCOSE SERPL-MCNC: 155 MG/DL (ref 70–110)
HBA1C MFR BLD: 9.1 % (ref 4–5.6)
POTASSIUM SERPL-SCNC: 3.9 MMOL/L (ref 3.5–5.1)
SODIUM SERPL-SCNC: 140 MMOL/L (ref 136–145)

## 2023-02-03 PROCEDURE — 99999 PR PBB SHADOW E&M-EST. PATIENT-LVL IV: CPT | Mod: PBBFAC,,, | Performed by: INTERNAL MEDICINE

## 2023-02-03 PROCEDURE — 3075F PR MOST RECENT SYSTOLIC BLOOD PRESS GE 130-139MM HG: ICD-10-PCS | Mod: CPTII,S$GLB,, | Performed by: INTERNAL MEDICINE

## 2023-02-03 PROCEDURE — 83036 HEMOGLOBIN GLYCOSYLATED A1C: CPT | Performed by: INTERNAL MEDICINE

## 2023-02-03 PROCEDURE — 3046F HEMOGLOBIN A1C LEVEL >9.0%: CPT | Mod: CPTII,S$GLB,, | Performed by: INTERNAL MEDICINE

## 2023-02-03 PROCEDURE — 3072F PR LOW RISK FOR RETINOPATHY: ICD-10-PCS | Mod: CPTII,S$GLB,, | Performed by: INTERNAL MEDICINE

## 2023-02-03 PROCEDURE — 3046F PR MOST RECENT HEMOGLOBIN A1C LEVEL > 9.0%: ICD-10-PCS | Mod: CPTII,S$GLB,, | Performed by: INTERNAL MEDICINE

## 2023-02-03 PROCEDURE — 90471 FLU VACCINE (QUAD) GREATER THAN OR EQUAL TO 3YO PRESERVATIVE FREE IM: ICD-10-PCS | Mod: S$GLB,,, | Performed by: INTERNAL MEDICINE

## 2023-02-03 PROCEDURE — 90686 FLU VACCINE (QUAD) GREATER THAN OR EQUAL TO 3YO PRESERVATIVE FREE IM: ICD-10-PCS | Mod: S$GLB,,, | Performed by: INTERNAL MEDICINE

## 2023-02-03 PROCEDURE — 1159F MED LIST DOCD IN RCRD: CPT | Mod: CPTII,S$GLB,, | Performed by: INTERNAL MEDICINE

## 2023-02-03 PROCEDURE — 3008F BODY MASS INDEX DOCD: CPT | Mod: CPTII,S$GLB,, | Performed by: INTERNAL MEDICINE

## 2023-02-03 PROCEDURE — 3072F LOW RISK FOR RETINOPATHY: CPT | Mod: CPTII,S$GLB,, | Performed by: INTERNAL MEDICINE

## 2023-02-03 PROCEDURE — 3079F DIAST BP 80-89 MM HG: CPT | Mod: CPTII,S$GLB,, | Performed by: INTERNAL MEDICINE

## 2023-02-03 PROCEDURE — 4010F PR ACE/ARB THEARPY RXD/TAKEN: ICD-10-PCS | Mod: CPTII,S$GLB,, | Performed by: INTERNAL MEDICINE

## 2023-02-03 PROCEDURE — 99999 PR PBB SHADOW E&M-EST. PATIENT-LVL IV: ICD-10-PCS | Mod: PBBFAC,,, | Performed by: INTERNAL MEDICINE

## 2023-02-03 PROCEDURE — 3079F PR MOST RECENT DIASTOLIC BLOOD PRESSURE 80-89 MM HG: ICD-10-PCS | Mod: CPTII,S$GLB,, | Performed by: INTERNAL MEDICINE

## 2023-02-03 PROCEDURE — 99214 OFFICE O/P EST MOD 30 MIN: CPT | Mod: 25,S$GLB,, | Performed by: INTERNAL MEDICINE

## 2023-02-03 PROCEDURE — 90686 IIV4 VACC NO PRSV 0.5 ML IM: CPT | Mod: S$GLB,,, | Performed by: INTERNAL MEDICINE

## 2023-02-03 PROCEDURE — 4010F ACE/ARB THERAPY RXD/TAKEN: CPT | Mod: CPTII,S$GLB,, | Performed by: INTERNAL MEDICINE

## 2023-02-03 PROCEDURE — 3075F SYST BP GE 130 - 139MM HG: CPT | Mod: CPTII,S$GLB,, | Performed by: INTERNAL MEDICINE

## 2023-02-03 PROCEDURE — 80048 BASIC METABOLIC PNL TOTAL CA: CPT | Performed by: INTERNAL MEDICINE

## 2023-02-03 PROCEDURE — 1159F PR MEDICATION LIST DOCUMENTED IN MEDICAL RECORD: ICD-10-PCS | Mod: CPTII,S$GLB,, | Performed by: INTERNAL MEDICINE

## 2023-02-03 PROCEDURE — 99214 PR OFFICE/OUTPT VISIT, EST, LEVL IV, 30-39 MIN: ICD-10-PCS | Mod: 25,S$GLB,, | Performed by: INTERNAL MEDICINE

## 2023-02-03 PROCEDURE — 90471 IMMUNIZATION ADMIN: CPT | Mod: S$GLB,,, | Performed by: INTERNAL MEDICINE

## 2023-02-03 PROCEDURE — 1160F RVW MEDS BY RX/DR IN RCRD: CPT | Mod: CPTII,S$GLB,, | Performed by: INTERNAL MEDICINE

## 2023-02-03 PROCEDURE — 3008F PR BODY MASS INDEX (BMI) DOCUMENTED: ICD-10-PCS | Mod: CPTII,S$GLB,, | Performed by: INTERNAL MEDICINE

## 2023-02-03 PROCEDURE — 1160F PR REVIEW ALL MEDS BY PRESCRIBER/CLIN PHARMACIST DOCUMENTED: ICD-10-PCS | Mod: CPTII,S$GLB,, | Performed by: INTERNAL MEDICINE

## 2023-02-03 PROCEDURE — 36415 COLL VENOUS BLD VENIPUNCTURE: CPT | Mod: PN | Performed by: INTERNAL MEDICINE

## 2023-02-03 RX ORDER — LOSARTAN POTASSIUM 50 MG/1
50 TABLET ORAL DAILY
Qty: 30 TABLET | Refills: 6 | Status: SHIPPED | OUTPATIENT
Start: 2023-02-03 | End: 2023-08-03 | Stop reason: SDUPTHER

## 2023-02-03 RX ORDER — METFORMIN HYDROCHLORIDE 1000 MG/1
1000 TABLET ORAL 2 TIMES DAILY WITH MEALS
Qty: 60 TABLET | Refills: 6 | Status: SHIPPED | OUTPATIENT
Start: 2023-02-03 | End: 2023-08-03 | Stop reason: SDUPTHER

## 2023-02-03 RX ORDER — MELOXICAM 15 MG/1
15 TABLET ORAL
COMMUNITY
Start: 2023-01-30 | End: 2023-08-03

## 2023-02-03 RX ORDER — ATORVASTATIN CALCIUM 20 MG/1
20 TABLET, FILM COATED ORAL DAILY
Qty: 30 TABLET | Refills: 6 | Status: SHIPPED | OUTPATIENT
Start: 2023-02-03 | End: 2023-08-03 | Stop reason: SDUPTHER

## 2023-02-03 RX ORDER — LANCETS 33 GAUGE
EACH MISCELLANEOUS
COMMUNITY
Start: 2022-12-20

## 2023-02-03 RX ORDER — AMLODIPINE BESYLATE 10 MG/1
10 TABLET ORAL NIGHTLY
Qty: 30 TABLET | Refills: 6 | Status: SHIPPED | OUTPATIENT
Start: 2023-02-03 | End: 2023-08-03 | Stop reason: SDUPTHER

## 2023-02-03 NOTE — PROGRESS NOTES
Two patient identifiers verified.  Allergies reviewedLFlu IM administered to right deltoid per MD order.  Patient tolerated injection well; no redness, bleeding, or bruising noted to injection site.  Patient instructed to remain in clinic setting for 15 minutes.  Verbalizes understanding.

## 2023-02-03 NOTE — PROGRESS NOTES
"Subjective:       Patient ID: Parth Lund is a 62 y.o. male.    Chief Complaint: Follow-up    Last seen six months ago for annual physical. Returns for scheduled f/u chronic medical conditions. Reports compliance with daily meds as prescribed, blood pressure is unusually high today (no home monitoring). Glucose ranges 125-150 at home, but some of these readings are after breakfast. Feeling well, no complaints. Exercises daily with no chest discomfort.      PMH:   Hypertension.   Diabetes type 2, HbA1c 7.4% Nov. '22.  Hyperlipidemia, LDL 57 July '22.  Erectile Dysfunction.    PSH: None.     Colonoscopy normal 4/14. Eye exam 12/22 - no DR. Flu shot 11/19. Tdap 2014. Pneumovax 2014. Prevnar 2015. Shingrix 7/20, 9/20. COVID (J&J) 7/21, no booster.    Social: Non-smoker, social alcohol. , 4 children.  for [employer].     FMH: HTN, DM, Chol, Stroke, Heart dis, Throat cancer.     NKDA.     Medications: list reviewed and reconciled.     Review of Systems   Constitutional:  Negative for fatigue and fever.   Eyes:  Negative for visual disturbance.   Respiratory:  Negative for cough and shortness of breath.    Cardiovascular:  Negative for chest pain, palpitations and leg swelling.   Gastrointestinal:  Negative for abdominal pain, diarrhea, nausea and vomiting.   Genitourinary:  Negative for dysuria and frequency.   Musculoskeletal:  Negative for arthralgias and myalgias.   Neurological:  Negative for dizziness, syncope, weakness, numbness and headaches.       Objective:      Vitals:    02/03/23 1044   BP: (!) 154/90                Repeat 138/80   Pulse: (!) 57   Temp: 97.8 °F (36.6 °C)   SpO2: 97%   Weight: 80.9 kg (178 lb 6.4 oz)   Height: 5' 8" (1.727 m)   BMI=27  Physical Exam  Constitutional:       General: He is not in acute distress.     Appearance: He is not ill-appearing.   Cardiovascular:      Rate and Rhythm: Normal rate and regular rhythm.      Heart sounds: Normal heart sounds. "   Pulmonary:      Effort: Pulmonary effort is normal. No respiratory distress.      Breath sounds: Normal breath sounds. No wheezing or rales.   Musculoskeletal:         General: Normal range of motion.      Right lower leg: No edema.      Left lower leg: No edema.   Skin:     General: Skin is warm and dry.   Neurological:      Mental Status: He is alert.      Cranial Nerves: No cranial nerve deficit.      Coordination: Coordination normal.      Gait: Gait normal.   Psychiatric:         Mood and Affect: Mood normal.         Behavior: Behavior normal.       Assessment:       Problem List Items Addressed This Visit       HLD (hyperlipidemia)     Other Visit Diagnoses       Type 2 diabetes mellitus without complication, without long-term current use of insulin    -  Primary    Relevant Orders    Hemoglobin A1C    Essential hypertension        Relevant Orders    Basic Metabolic Panel    Need for COVID-19 vaccine        Influenza vaccine needed                  Plan:       Type 2 diabetes mellitus without complication, without long-term current use of insulin  -     Hemoglobin A1C; Future; Expected date: 02/03/2023  -     med orders upon review of labs.     Essential hypertension - usually controlled  -     Basic Metabolic Panel; Future; Expected date: 02/03/2023  -     Nurse visit for BP check in one month, increase Losartan if not at goal.     Hyperlipidemia, unspecified hyperlipidemia type        -     at goal on Atorvastatin 20, continue same.     Need for COVID-19 vaccine - Bivalent booster recommended.     Influenza vaccine needed - Flu shot today.

## 2023-03-03 ENCOUNTER — CLINICAL SUPPORT (OUTPATIENT)
Dept: PRIMARY CARE CLINIC | Facility: CLINIC | Age: 63
End: 2023-03-03
Payer: COMMERCIAL

## 2023-03-03 VITALS — HEART RATE: 78 BPM | DIASTOLIC BLOOD PRESSURE: 80 MMHG | SYSTOLIC BLOOD PRESSURE: 130 MMHG

## 2023-03-03 DIAGNOSIS — I10 ESSENTIAL HYPERTENSION: Primary | ICD-10-CM

## 2023-03-03 PROCEDURE — 99999 PR PBB SHADOW E&M-EST. PATIENT-LVL II: ICD-10-PCS | Mod: PBBFAC,,,

## 2023-03-03 PROCEDURE — 99999 PR PBB SHADOW E&M-EST. PATIENT-LVL II: CPT | Mod: PBBFAC,,,

## 2023-03-03 NOTE — PATIENT INSTRUCTIONS
Pt came in for a follow up visit bp check   Bp reading on 154/90 and 138/80  Today bp is 130/60 pt state he is compliant with med regimen    Losartan 50 mg daily and amlodipine 10 mg daily. No issues

## 2023-04-11 DIAGNOSIS — E11.9 TYPE 2 DIABETES MELLITUS WITHOUT COMPLICATION, WITHOUT LONG-TERM CURRENT USE OF INSULIN: ICD-10-CM

## 2023-04-11 DIAGNOSIS — Z00.00 ROUTINE MEDICAL EXAM: Primary | ICD-10-CM

## 2023-04-11 DIAGNOSIS — Z12.5 PROSTATE CANCER SCREENING: ICD-10-CM

## 2023-04-11 NOTE — TELEPHONE ENCOUNTER
No new care gaps identified.  [facility] Embedded Care Gaps. Reference number: [id number]. 4/11/2023   10:36:33 AM ANGELINET

## 2023-04-11 NOTE — TELEPHONE ENCOUNTER
Refill Routing Note   Medication(s) are not appropriate for processing by [facility] for the following reason(s):      New or recently adjusted medication: dose decreased to 2 tablets QAM and 1 tablet QPM 3/15/23    [facility] action(s):  Defer            Appointments  past 12m or future 3m with PCP    Date Provider   Last Visit   2/3/2023 Danita Natarajan MD   Next Visit   8/3/2023 Danita Natarajan MD   ED visits in past 90 days: 0        Note composed:1:38 PM 04/11/2023

## 2023-04-12 RX ORDER — GLYBURIDE 5 MG/1
10 TABLET ORAL 2 TIMES DAILY WITH MEALS
Qty: 360 TABLET | Refills: 1 | Status: SHIPPED | OUTPATIENT
Start: 2023-04-12 | End: 2023-08-03 | Stop reason: SDUPTHER

## 2023-04-12 NOTE — TELEPHONE ENCOUNTER
Glyburide 5mg three tabs daily was authorized by the refill service last month, after I had increased this med to four tabs daily in November. New order sent today to correct this back to 10mg BID. Please schedule annual fasting labs after 7/22 prior to his physical in early August.

## 2023-07-20 ENCOUNTER — PATIENT OUTREACH (OUTPATIENT)
Dept: ADMINISTRATIVE | Facility: HOSPITAL | Age: 63
End: 2023-07-20
Payer: COMMERCIAL

## 2023-07-20 NOTE — PROGRESS NOTES
Health Maintenance Due   Topic Date Due    COVID-19 Vaccine (2 - Booster for Tino series) 08/27/2021    Diabetes Urine Screening  07/22/2023    Foot Exam  08/04/2023        Chart review done.   HM updated.   Immunizations reviewed & updated.   Care Everywhere updated.

## 2023-07-28 ENCOUNTER — LAB VISIT (OUTPATIENT)
Dept: LAB | Facility: HOSPITAL | Age: 63
End: 2023-07-28
Attending: INTERNAL MEDICINE
Payer: COMMERCIAL

## 2023-07-28 DIAGNOSIS — E11.9 TYPE 2 DIABETES MELLITUS WITHOUT COMPLICATION, WITHOUT LONG-TERM CURRENT USE OF INSULIN: ICD-10-CM

## 2023-07-28 DIAGNOSIS — Z12.5 PROSTATE CANCER SCREENING: ICD-10-CM

## 2023-07-28 DIAGNOSIS — Z00.00 ROUTINE MEDICAL EXAM: ICD-10-CM

## 2023-07-28 LAB
ALBUMIN SERPL BCP-MCNC: 3.6 G/DL (ref 3.5–5.2)
ALP SERPL-CCNC: 121 U/L (ref 55–135)
ALT SERPL W/O P-5'-P-CCNC: 19 U/L (ref 10–44)
ANION GAP SERPL CALC-SCNC: 9 MMOL/L (ref 8–16)
AST SERPL-CCNC: 15 U/L (ref 10–40)
BILIRUB SERPL-MCNC: 0.5 MG/DL (ref 0.1–1)
BUN SERPL-MCNC: 9 MG/DL (ref 8–23)
CALCIUM SERPL-MCNC: 9.5 MG/DL (ref 8.7–10.5)
CHLORIDE SERPL-SCNC: 104 MMOL/L (ref 95–110)
CHOLEST SERPL-MCNC: 113 MG/DL (ref 120–199)
CHOLEST/HDLC SERPL: 2.8 {RATIO} (ref 2–5)
CO2 SERPL-SCNC: 27 MMOL/L (ref 23–29)
COMPLEXED PSA SERPL-MCNC: 3.1 NG/ML (ref 0–4)
CREAT SERPL-MCNC: 1.2 MG/DL (ref 0.5–1.4)
ERYTHROCYTE [DISTWIDTH] IN BLOOD BY AUTOMATED COUNT: 12.9 % (ref 11.5–14.5)
EST. GFR  (NO RACE VARIABLE): >60 ML/MIN/1.73 M^2
ESTIMATED AVG GLUCOSE: 226 MG/DL (ref 68–131)
GLUCOSE SERPL-MCNC: 217 MG/DL (ref 70–110)
HBA1C MFR BLD: 9.5 % (ref 4–5.6)
HCT VFR BLD AUTO: 39.7 % (ref 40–54)
HDLC SERPL-MCNC: 40 MG/DL (ref 40–75)
HDLC SERPL: 35.4 % (ref 20–50)
HGB BLD-MCNC: 12.4 G/DL (ref 14–18)
LDLC SERPL CALC-MCNC: 61.2 MG/DL (ref 63–159)
MCH RBC QN AUTO: 27.5 PG (ref 27–31)
MCHC RBC AUTO-ENTMCNC: 31.2 G/DL (ref 32–36)
MCV RBC AUTO: 88 FL (ref 82–98)
NONHDLC SERPL-MCNC: 73 MG/DL
PLATELET # BLD AUTO: 260 K/UL (ref 150–450)
PMV BLD AUTO: 11.6 FL (ref 9.2–12.9)
POTASSIUM SERPL-SCNC: 3.7 MMOL/L (ref 3.5–5.1)
PROT SERPL-MCNC: 6.7 G/DL (ref 6–8.4)
RBC # BLD AUTO: 4.51 M/UL (ref 4.6–6.2)
SODIUM SERPL-SCNC: 140 MMOL/L (ref 136–145)
TRIGL SERPL-MCNC: 59 MG/DL (ref 30–150)
WBC # BLD AUTO: 5.11 K/UL (ref 3.9–12.7)

## 2023-07-28 PROCEDURE — 36415 COLL VENOUS BLD VENIPUNCTURE: CPT | Mod: PN | Performed by: INTERNAL MEDICINE

## 2023-07-28 PROCEDURE — 80053 COMPREHEN METABOLIC PANEL: CPT | Performed by: INTERNAL MEDICINE

## 2023-07-28 PROCEDURE — 80061 LIPID PANEL: CPT | Performed by: INTERNAL MEDICINE

## 2023-07-28 PROCEDURE — 84153 ASSAY OF PSA TOTAL: CPT | Performed by: INTERNAL MEDICINE

## 2023-07-28 PROCEDURE — 83036 HEMOGLOBIN GLYCOSYLATED A1C: CPT | Performed by: INTERNAL MEDICINE

## 2023-07-28 PROCEDURE — 85027 COMPLETE CBC AUTOMATED: CPT | Performed by: INTERNAL MEDICINE

## 2023-08-03 ENCOUNTER — OFFICE VISIT (OUTPATIENT)
Dept: PRIMARY CARE CLINIC | Facility: CLINIC | Age: 63
End: 2023-08-03
Payer: COMMERCIAL

## 2023-08-03 VITALS
SYSTOLIC BLOOD PRESSURE: 136 MMHG | BODY MASS INDEX: 26.73 KG/M2 | TEMPERATURE: 98 F | WEIGHT: 176.38 LBS | HEART RATE: 74 BPM | OXYGEN SATURATION: 97 % | DIASTOLIC BLOOD PRESSURE: 80 MMHG | HEIGHT: 68 IN

## 2023-08-03 DIAGNOSIS — E78.5 HYPERLIPIDEMIA, UNSPECIFIED HYPERLIPIDEMIA TYPE: ICD-10-CM

## 2023-08-03 DIAGNOSIS — E11.65 UNCONTROLLED TYPE 2 DIABETES MELLITUS WITH HYPERGLYCEMIA, WITHOUT LONG-TERM CURRENT USE OF INSULIN: ICD-10-CM

## 2023-08-03 DIAGNOSIS — Z00.00 ROUTINE MEDICAL EXAM: Primary | ICD-10-CM

## 2023-08-03 DIAGNOSIS — R97.20 INCREASED PROSTATE SPECIFIC ANTIGEN (PSA) VELOCITY: ICD-10-CM

## 2023-08-03 DIAGNOSIS — Z23 NEED FOR COVID-19 VACCINE: ICD-10-CM

## 2023-08-03 DIAGNOSIS — D64.9 NORMOCYTIC ANEMIA: ICD-10-CM

## 2023-08-03 DIAGNOSIS — I10 ESSENTIAL HYPERTENSION: ICD-10-CM

## 2023-08-03 LAB
ALBUMIN/CREAT UR: 4.7 UG/MG (ref 0–30)
BILIRUB UR QL STRIP: NEGATIVE
CLARITY UR REFRACT.AUTO: CLEAR
COLOR UR AUTO: YELLOW
CREAT UR-MCNC: 172 MG/DL (ref 23–375)
GLUCOSE UR QL STRIP: ABNORMAL
HGB UR QL STRIP: NEGATIVE
KETONES UR QL STRIP: NEGATIVE
LEUKOCYTE ESTERASE UR QL STRIP: NEGATIVE
MICROALBUMIN UR DL<=1MG/L-MCNC: 8 UG/ML
MICROSCOPIC COMMENT: NORMAL
NITRITE UR QL STRIP: NEGATIVE
PH UR STRIP: 5 [PH] (ref 5–8)
PROT UR QL STRIP: NEGATIVE
RBC #/AREA URNS AUTO: 1 /HPF (ref 0–4)
SP GR UR STRIP: 1.02 (ref 1–1.03)
SQUAMOUS #/AREA URNS AUTO: 0 /HPF
URN SPEC COLLECT METH UR: ABNORMAL
WBC #/AREA URNS AUTO: 1 /HPF (ref 0–5)

## 2023-08-03 PROCEDURE — 3075F SYST BP GE 130 - 139MM HG: CPT | Mod: CPTII,S$GLB,, | Performed by: INTERNAL MEDICINE

## 2023-08-03 PROCEDURE — 4010F ACE/ARB THERAPY RXD/TAKEN: CPT | Mod: CPTII,S$GLB,, | Performed by: INTERNAL MEDICINE

## 2023-08-03 PROCEDURE — 3075F PR MOST RECENT SYSTOLIC BLOOD PRESS GE 130-139MM HG: ICD-10-PCS | Mod: CPTII,S$GLB,, | Performed by: INTERNAL MEDICINE

## 2023-08-03 PROCEDURE — 1159F MED LIST DOCD IN RCRD: CPT | Mod: CPTII,S$GLB,, | Performed by: INTERNAL MEDICINE

## 2023-08-03 PROCEDURE — 3046F PR MOST RECENT HEMOGLOBIN A1C LEVEL > 9.0%: ICD-10-PCS | Mod: CPTII,S$GLB,, | Performed by: INTERNAL MEDICINE

## 2023-08-03 PROCEDURE — 1160F PR REVIEW ALL MEDS BY PRESCRIBER/CLIN PHARMACIST DOCUMENTED: ICD-10-PCS | Mod: CPTII,S$GLB,, | Performed by: INTERNAL MEDICINE

## 2023-08-03 PROCEDURE — 82570 ASSAY OF URINE CREATININE: CPT | Performed by: INTERNAL MEDICINE

## 2023-08-03 PROCEDURE — 3008F BODY MASS INDEX DOCD: CPT | Mod: CPTII,S$GLB,, | Performed by: INTERNAL MEDICINE

## 2023-08-03 PROCEDURE — 99396 PR PREVENTIVE VISIT,EST,40-64: ICD-10-PCS | Mod: S$GLB,,, | Performed by: INTERNAL MEDICINE

## 2023-08-03 PROCEDURE — 1160F RVW MEDS BY RX/DR IN RCRD: CPT | Mod: CPTII,S$GLB,, | Performed by: INTERNAL MEDICINE

## 2023-08-03 PROCEDURE — 3072F LOW RISK FOR RETINOPATHY: CPT | Mod: CPTII,S$GLB,, | Performed by: INTERNAL MEDICINE

## 2023-08-03 PROCEDURE — 99999 PR PBB SHADOW E&M-EST. PATIENT-LVL IV: ICD-10-PCS | Mod: PBBFAC,,, | Performed by: INTERNAL MEDICINE

## 2023-08-03 PROCEDURE — 3072F PR LOW RISK FOR RETINOPATHY: ICD-10-PCS | Mod: CPTII,S$GLB,, | Performed by: INTERNAL MEDICINE

## 2023-08-03 PROCEDURE — 1159F PR MEDICATION LIST DOCUMENTED IN MEDICAL RECORD: ICD-10-PCS | Mod: CPTII,S$GLB,, | Performed by: INTERNAL MEDICINE

## 2023-08-03 PROCEDURE — 81001 URINALYSIS AUTO W/SCOPE: CPT | Performed by: INTERNAL MEDICINE

## 2023-08-03 PROCEDURE — 3008F PR BODY MASS INDEX (BMI) DOCUMENTED: ICD-10-PCS | Mod: CPTII,S$GLB,, | Performed by: INTERNAL MEDICINE

## 2023-08-03 PROCEDURE — 3046F HEMOGLOBIN A1C LEVEL >9.0%: CPT | Mod: CPTII,S$GLB,, | Performed by: INTERNAL MEDICINE

## 2023-08-03 PROCEDURE — 3079F DIAST BP 80-89 MM HG: CPT | Mod: CPTII,S$GLB,, | Performed by: INTERNAL MEDICINE

## 2023-08-03 PROCEDURE — 99396 PREV VISIT EST AGE 40-64: CPT | Mod: S$GLB,,, | Performed by: INTERNAL MEDICINE

## 2023-08-03 PROCEDURE — 99999 PR PBB SHADOW E&M-EST. PATIENT-LVL IV: CPT | Mod: PBBFAC,,, | Performed by: INTERNAL MEDICINE

## 2023-08-03 PROCEDURE — 3079F PR MOST RECENT DIASTOLIC BLOOD PRESSURE 80-89 MM HG: ICD-10-PCS | Mod: CPTII,S$GLB,, | Performed by: INTERNAL MEDICINE

## 2023-08-03 PROCEDURE — 4010F PR ACE/ARB THEARPY RXD/TAKEN: ICD-10-PCS | Mod: CPTII,S$GLB,, | Performed by: INTERNAL MEDICINE

## 2023-08-03 RX ORDER — METFORMIN HYDROCHLORIDE 1000 MG/1
1000 TABLET ORAL 2 TIMES DAILY WITH MEALS
Qty: 180 TABLET | Refills: 1 | Status: SHIPPED | OUTPATIENT
Start: 2023-08-03

## 2023-08-03 RX ORDER — ATORVASTATIN CALCIUM 20 MG/1
20 TABLET, FILM COATED ORAL DAILY
Qty: 90 TABLET | Refills: 3 | Status: SHIPPED | OUTPATIENT
Start: 2023-08-03

## 2023-08-03 RX ORDER — GLYBURIDE 5 MG/1
10 TABLET ORAL 2 TIMES DAILY WITH MEALS
Qty: 360 TABLET | Refills: 1 | Status: SHIPPED | OUTPATIENT
Start: 2023-08-03

## 2023-08-03 RX ORDER — AMLODIPINE BESYLATE 10 MG/1
10 TABLET ORAL NIGHTLY
Qty: 90 TABLET | Refills: 1 | Status: SHIPPED | OUTPATIENT
Start: 2023-08-03

## 2023-08-03 RX ORDER — LOSARTAN POTASSIUM 50 MG/1
50 TABLET ORAL DAILY
Qty: 90 TABLET | Refills: 1 | Status: SHIPPED | OUTPATIENT
Start: 2023-08-03 | End: 2023-12-27

## 2023-08-03 NOTE — PROGRESS NOTES
Subjective     Patient ID: Parth Lund is a 63 y.o. male.    Chief Complaint: Annual Exam    Last seen six months ago, returns for annual physical. Compliant with daily meds as prescribed including the increased dose of Glyburide last visit for increasing blood sugar. Home glucose monitoring is generally near 200 even fasting. Review of diet reveals high intake of sweet beverages (sodas, juice and sweet tea), high carbohydrate intake, fried food and a lot of red meat.   No complaints, feeling well. Retired a month ago!    PMH:   Hypertension.   Diabetes type 2, HbA1c 9.1% Feb. '23.  Hyperlipidemia, LDL 57 July '22.  Erectile Dysfunction.    PSH: None.     Colonoscopy normal 4/14. Eye exam 12/22 - no DR. Flu shot 11/19. Tdap 2014. Pneumovax 2014. Prevnar 2015. Shingrix 7/20, 9/20. COVID (J&J) 7/21, no booster.    Social: Non-smoker, social alcohol. , 4 children.  for [employer] - retired 6/30/23.    FMH: HTN, DM, Chol, Stroke, Heart dis, Throat cancer.     NKDA.     Medications: list reviewed and reconciled.       Review of Systems   Constitutional:  Negative for activity change, appetite change, chills, fatigue, fever and unexpected weight change.   HENT:  Negative for nasal congestion, ear pain, hearing loss, postnasal drip, rhinorrhea, sore throat, trouble swallowing and voice change.    Eyes:  Negative for pain and visual disturbance.   Respiratory:  Negative for apnea, cough, chest tightness, shortness of breath and wheezing.    Cardiovascular:  Negative for chest pain, palpitations and leg swelling.   Gastrointestinal:  Negative for abdominal pain, blood in stool, constipation, diarrhea, nausea and vomiting.   Genitourinary:  Negative for difficulty urinating, dysuria, frequency, hematuria and urgency.   Musculoskeletal:  Negative for arthralgias, back pain, gait problem, myalgias, neck pain and neck stiffness.   Integumentary:  Negative for color change and rash.  "  Neurological:  Negative for dizziness, seizures, syncope, weakness, numbness and headaches.   Hematological:  Negative for adenopathy. Does not bruise/bleed easily.   Psychiatric/Behavioral:  Negative for agitation, dysphoric mood and sleep disturbance. The patient is not nervous/anxious.           Objective   Vitals:    08/03/23 0926   BP: 136/80   Pulse: 74   Temp: 98.2 °F (36.8 °C)   SpO2: 97%   Weight: 80 kg (176 lb 6.4 oz)       From 178.   Height: 5' 8" (1.727 m)   BMI=26.8  Physical Exam  Constitutional:       General: He is not in acute distress.     Appearance: Normal appearance. He is well-developed. He is not diaphoretic.   HENT:      Head: Normocephalic and atraumatic.      Right Ear: Tympanic membrane, ear canal and external ear normal.      Left Ear: Tympanic membrane, ear canal and external ear normal.      Nose: Nose normal. No congestion.      Mouth/Throat:      Mouth: Mucous membranes are moist.      Pharynx: Oropharynx is clear.   Eyes:      General: No scleral icterus.     Extraocular Movements: Extraocular movements intact.      Conjunctiva/sclera: Conjunctivae normal.      Pupils: Pupils are equal, round, and reactive to light.   Neck:      Thyroid: No thyromegaly.      Vascular: No carotid bruit or JVD.   Cardiovascular:      Rate and Rhythm: Normal rate and regular rhythm.      Pulses: Normal pulses.      Heart sounds: Normal heart sounds. No murmur heard.     No friction rub. No gallop.   Pulmonary:      Effort: Pulmonary effort is normal. No respiratory distress.      Breath sounds: Normal breath sounds. No wheezing, rhonchi or rales.   Abdominal:      General: Bowel sounds are normal. There is no distension.      Palpations: Abdomen is soft. There is no mass.      Tenderness: There is no abdominal tenderness.      Hernia: No hernia is present.   Genitourinary:     Prostate: Normal.      Rectum: Normal. Guaiac result negative.   Musculoskeletal:         General: No tenderness or " deformity. Normal range of motion.      Cervical back: Normal range of motion and neck supple.      Right lower leg: No edema.      Left lower leg: No edema.   Lymphadenopathy:      Cervical: No cervical adenopathy.   Skin:     General: Skin is warm and dry.      Findings: No rash.   Neurological:      General: No focal deficit present.      Mental Status: He is alert and oriented to person, place, and time.      Cranial Nerves: No cranial nerve deficit.      Motor: No weakness or abnormal muscle tone.      Coordination: Coordination normal.      Gait: Gait normal.   Psychiatric:         Mood and Affect: Mood and affect normal.         Speech: Speech normal.         Behavior: Behavior normal.         Thought Content: Thought content normal.         Judgment: Judgment normal.       Lab Visit on 07/28/2023   Component Date Value    WBC 07/28/2023 5.11     RBC 07/28/2023 4.51 (L)     Hemoglobin 07/28/2023 12.4 (L)     Hematocrit 07/28/2023 39.7 (L)     MCV 07/28/2023 88     MCH 07/28/2023 27.5     MCHC 07/28/2023 31.2 (L)     RDW 07/28/2023 12.9     Platelets 07/28/2023 260     MPV 07/28/2023 11.6     Sodium 07/28/2023 140     Potassium 07/28/2023 3.7     Chloride 07/28/2023 104     CO2 07/28/2023 27     Glucose 07/28/2023 217 (H)     BUN 07/28/2023 9     Creatinine 07/28/2023 1.2     Calcium 07/28/2023 9.5     Total Protein 07/28/2023 6.7     Albumin 07/28/2023 3.6     Total Bilirubin 07/28/2023 0.5     Alkaline Phosphatase 07/28/2023 121     AST 07/28/2023 15     ALT 07/28/2023 19     eGFR 07/28/2023 >60.0     Anion Gap 07/28/2023 9     Cholesterol 07/28/2023 113 (L)     Triglycerides 07/28/2023 59     HDL 07/28/2023 40     LDL Cholesterol 07/28/2023 61.2 (L)     HDL/Cholesterol Ratio 07/28/2023 35.4     Total Cholesterol/HDL Ra* 07/28/2023 2.8     Non-HDL Cholesterol 07/28/2023 73     Hemoglobin A1C 07/28/2023 9.5 (H)     Estimated Avg Glucose 07/28/2023 226 (H)     PSA, Screen 07/28/2023 3.1                 From  1.7 a year ago.        Assessment and Plan     1. Routine medical exam  -     Urinalysis  -     SITagliptin phosphate (JANUVIA) 50 MG Tab; Take 1 tablet (50 mg total) by mouth once daily. For Diabetes.  Dispense: 30 tablet; Refill: 3  -     metFORMIN (GLUCOPHAGE) 1000 MG tablet; Take 1 tablet (1,000 mg total) by mouth 2 (two) times daily with meals.  Dispense: 180 tablet; Refill: 1  -     glyBURIDE (DIABETA) 5 MG tablet; Take 2 tablets (10 mg total) by mouth 2 (two) times daily with meals.  Dispense: 360 tablet; Refill: 1    2. Uncontrolled type 2 diabetes mellitus with hyperglycemia, without long-term current use of insulin  -     Microalbumin/Creatinine Ratio, Urine  -     Hemoglobin A1C; Future; Expected date: 08/03/2023  -     flash glucose sensor Kit; 1 Device by Misc.(Non-Drug; Combo Route) route every 14 (fourteen) days.  Dispense: 6 kit; Refill: 3  -     med orders as above, STARTING Januvia.  -     counseled on multiple changes he can make in his diet to get glucose under control.    3. Essential hypertension - usually controlled, continue same.  -     amLODIPine (NORVASC) 10 MG tablet; Take 1 tablet (10 mg total) by mouth every evening.  Dispense: 90 tablet; Refill: 1  -     losartan (COZAAR) 50 MG tablet; Take 1 tablet (50 mg total) by mouth once daily.  Dispense: 90 tablet; Refill: 1    4. Hyperlipidemia, unspecified hyperlipidemia type - at goal.  -     atorvastatin (LIPITOR) 20 MG tablet; Take 1 tablet (20 mg total) by mouth once daily.  Dispense: 90 tablet; Refill: 3    5. Normocytic anemia - slight.  -     Iron and TIBC; Future; Expected date: 08/03/2023  -     Ferritin; Future; Expected date: 08/03/2023  -     Vitamin B12; Future; Expected date: 08/03/2023  -     Folate; Future; Expected date: 08/03/2023  -     CBC Without Differential; Future; Expected date: 08/03/2023    6. Increased prostate specific antigen (PSA) velocity  -     PROSTATE SPECIFIC ANTIGEN, DIAGNOSTIC; Future - in 3 months.    7.  Need for COVID-19 vaccine - Bivalent booster recommended.     Flu shot in the Fall.       Follow up in about 4 months (around 12/3/2023) for Diabetes..

## 2023-08-09 ENCOUNTER — TELEPHONE (OUTPATIENT)
Dept: PRIMARY CARE CLINIC | Facility: CLINIC | Age: 63
End: 2023-08-09
Payer: COMMERCIAL

## 2023-08-09 NOTE — TELEPHONE ENCOUNTER
----- Message from Victoria Tucker sent at 8/9/2023  1:00 PM CDT -----  Contact: [phone] Patient  Patient would like to get medical advice.  Symptoms (please be specific):   Pt states his insurance is not covering the Rx for SITagliptin phosphate (JANUVIA) 50 MG Tab  How long have you had these symptoms: N/A  Would you like a call back, or a response through your [facility] portal?:   pt portal   Pharmacy name and phone # (copy from chart):   N/A  Comments:

## 2023-11-01 ENCOUNTER — TELEPHONE (OUTPATIENT)
Dept: PRIMARY CARE CLINIC | Facility: CLINIC | Age: 63
End: 2023-11-01

## 2023-11-01 ENCOUNTER — LAB VISIT (OUTPATIENT)
Dept: LAB | Facility: HOSPITAL | Age: 63
End: 2023-11-01
Attending: INTERNAL MEDICINE

## 2023-11-01 DIAGNOSIS — E11.65 UNCONTROLLED TYPE 2 DIABETES MELLITUS WITH HYPERGLYCEMIA, WITHOUT LONG-TERM CURRENT USE OF INSULIN: ICD-10-CM

## 2023-11-01 DIAGNOSIS — R97.20 INCREASED PROSTATE SPECIFIC ANTIGEN (PSA) VELOCITY: ICD-10-CM

## 2023-11-01 DIAGNOSIS — D64.9 NORMOCYTIC ANEMIA: ICD-10-CM

## 2023-11-01 LAB
COMPLEXED PSA SERPL-MCNC: 1.8 NG/ML (ref 0–4)
ERYTHROCYTE [DISTWIDTH] IN BLOOD BY AUTOMATED COUNT: 13.2 % (ref 11.5–14.5)
ESTIMATED AVG GLUCOSE: 212 MG/DL (ref 68–131)
FERRITIN SERPL-MCNC: 317 NG/ML (ref 20–300)
FOLATE SERPL-MCNC: 8.9 NG/ML (ref 4–24)
HBA1C MFR BLD: 9 % (ref 4–5.6)
HCT VFR BLD AUTO: 42.1 % (ref 40–54)
HGB BLD-MCNC: 13.3 G/DL (ref 14–18)
IRON SERPL-MCNC: 94 UG/DL (ref 45–160)
MCH RBC QN AUTO: 27.4 PG (ref 27–31)
MCHC RBC AUTO-ENTMCNC: 31.6 G/DL (ref 32–36)
MCV RBC AUTO: 87 FL (ref 82–98)
PLATELET # BLD AUTO: 266 K/UL (ref 150–450)
PMV BLD AUTO: 12 FL (ref 9.2–12.9)
RBC # BLD AUTO: 4.85 M/UL (ref 4.6–6.2)
SATURATED IRON: 28 % (ref 20–50)
TOTAL IRON BINDING CAPACITY: 337 UG/DL (ref 250–450)
TRANSFERRIN SERPL-MCNC: 228 MG/DL (ref 200–375)
VIT B12 SERPL-MCNC: 537 PG/ML (ref 210–950)
WBC # BLD AUTO: 4.31 K/UL (ref 3.9–12.7)

## 2023-11-01 PROCEDURE — 84153 ASSAY OF PSA TOTAL: CPT | Performed by: INTERNAL MEDICINE

## 2023-11-01 PROCEDURE — 82607 VITAMIN B-12: CPT | Performed by: INTERNAL MEDICINE

## 2023-11-01 PROCEDURE — 83540 ASSAY OF IRON: CPT | Performed by: INTERNAL MEDICINE

## 2023-11-01 PROCEDURE — 85027 COMPLETE CBC AUTOMATED: CPT | Performed by: INTERNAL MEDICINE

## 2023-11-01 PROCEDURE — 84466 ASSAY OF TRANSFERRIN: CPT | Performed by: INTERNAL MEDICINE

## 2023-11-01 PROCEDURE — 83036 HEMOGLOBIN GLYCOSYLATED A1C: CPT | Performed by: INTERNAL MEDICINE

## 2023-11-01 PROCEDURE — 82728 ASSAY OF FERRITIN: CPT | Performed by: INTERNAL MEDICINE

## 2023-11-01 PROCEDURE — 36415 COLL VENOUS BLD VENIPUNCTURE: CPT | Mod: PN | Performed by: INTERNAL MEDICINE

## 2023-11-01 PROCEDURE — 82746 ASSAY OF FOLIC ACID SERUM: CPT | Performed by: INTERNAL MEDICINE

## 2023-11-01 NOTE — TELEPHONE ENCOUNTER
Labs are all normal except blood sugar is running high, averaging near 210 - not much better than it was in July. His office visit is a month away. Please inquire about compliance with Glyburide, Metformin and Januvia as prescribed. And did he improve his diet?

## 2023-11-02 NOTE — TELEPHONE ENCOUNTER
Pt informed of results pt state he is taking Metformin , Januvia and Glyburide daily and he has improved his diet

## 2023-12-26 DIAGNOSIS — I10 ESSENTIAL HYPERTENSION: ICD-10-CM

## 2023-12-26 NOTE — TELEPHONE ENCOUNTER
No care due was identified.  [facility] Embedded Care Due Messages. Reference number: [id number].   12/26/2023 1:34:59 PM CST

## 2023-12-27 RX ORDER — LOSARTAN POTASSIUM 50 MG/1
50 TABLET ORAL DAILY
Qty: 90 TABLET | Refills: 1 | Status: SHIPPED | OUTPATIENT
Start: 2023-12-27

## 2023-12-27 NOTE — TELEPHONE ENCOUNTER
Refill Decision Note   Parth Kevon  is requesting a refill authorization.  Brief Assessment and Rationale for Refill:  Approve     Medication Therapy Plan:         Comments:     Note composed:12:50 PM 12/27/2023

## 2024-01-04 NOTE — PROGRESS NOTES
HPI     Last eye exam was 8/10/18 with Dr. Villarreal.  Patient states only uses glasses to read small print. No distance vision   complaints.  Patient denies diplopia, headaches, flashes/floaters, and pain.    Hemoglobin A1C       Date                     Value               Ref Range             Status                10/30/2019               6.0 (H)             4.0 - 5.6 %           Final                  Last edited by Norma Lowry on 12/17/2019  1:08 PM. (History)            Assessment /Plan     For exam results, see Encounter Report.    Type 2 diabetes mellitus without retinopathy    Presbyopia            1.  No retinopathy--monitor yearly.  BS control.  Eye health normal OU.  2.  No rx given.  Continue with OTC readers.                    Letter sent for patient to return call to schedule.

## 2024-05-24 LAB — CRC RECOMMENDATION EXT: NORMAL

## 2025-02-06 ENCOUNTER — OFFICE VISIT (OUTPATIENT)
Dept: PRIMARY CARE CLINIC | Facility: CLINIC | Age: 65
End: 2025-02-06
Payer: MEDICARE

## 2025-02-06 ENCOUNTER — PATIENT OUTREACH (OUTPATIENT)
Dept: ADMINISTRATIVE | Facility: HOSPITAL | Age: 65
End: 2025-02-06
Payer: MEDICARE

## 2025-02-06 VITALS
SYSTOLIC BLOOD PRESSURE: 138 MMHG | TEMPERATURE: 98 F | OXYGEN SATURATION: 98 % | HEIGHT: 68 IN | DIASTOLIC BLOOD PRESSURE: 66 MMHG | HEART RATE: 82 BPM | BODY MASS INDEX: 23.12 KG/M2 | RESPIRATION RATE: 18 BRPM | WEIGHT: 152.56 LBS

## 2025-02-06 DIAGNOSIS — E78.5 HYPERLIPIDEMIA, UNSPECIFIED HYPERLIPIDEMIA TYPE: ICD-10-CM

## 2025-02-06 DIAGNOSIS — Z12.5 PROSTATE CANCER SCREENING: ICD-10-CM

## 2025-02-06 DIAGNOSIS — I10 PRIMARY HYPERTENSION: ICD-10-CM

## 2025-02-06 DIAGNOSIS — Z12.12 ENCOUNTER FOR COLORECTAL CANCER SCREENING: ICD-10-CM

## 2025-02-06 DIAGNOSIS — Z23 NEED FOR VACCINATION: ICD-10-CM

## 2025-02-06 DIAGNOSIS — Z12.11 ENCOUNTER FOR COLORECTAL CANCER SCREENING: ICD-10-CM

## 2025-02-06 DIAGNOSIS — E11.65 TYPE 2 DIABETES MELLITUS WITH HYPERGLYCEMIA, WITHOUT LONG-TERM CURRENT USE OF INSULIN: ICD-10-CM

## 2025-02-06 DIAGNOSIS — R21 RASH AND NONSPECIFIC SKIN ERUPTION: Primary | ICD-10-CM

## 2025-02-06 PROCEDURE — G0009 ADMIN PNEUMOCOCCAL VACCINE: HCPCS | Mod: PBBFAC,PN

## 2025-02-06 PROCEDURE — 90677 PCV20 VACCINE IM: CPT | Mod: PBBFAC,PN

## 2025-02-06 PROCEDURE — 99999PBSHW PR PBB SHADOW TECHNICAL ONLY FILED TO HB: Mod: PBBFAC,,,

## 2025-02-06 PROCEDURE — 99999 PR PBB SHADOW E&M-EST. PATIENT-LVL V: CPT | Mod: PBBFAC,,, | Performed by: FAMILY MEDICINE

## 2025-02-06 PROCEDURE — 99215 OFFICE O/P EST HI 40 MIN: CPT | Mod: PBBFAC,PN,25 | Performed by: FAMILY MEDICINE

## 2025-02-06 PROCEDURE — G0008 ADMIN INFLUENZA VIRUS VAC: HCPCS | Mod: PBBFAC,PN

## 2025-02-06 PROCEDURE — 99214 OFFICE O/P EST MOD 30 MIN: CPT | Mod: S$PBB,,, | Performed by: FAMILY MEDICINE

## 2025-02-06 PROCEDURE — 90653 IIV ADJUVANT VACCINE IM: CPT | Mod: PBBFAC,PN

## 2025-02-06 RX ORDER — CETIRIZINE HYDROCHLORIDE 10 MG/1
10 TABLET ORAL DAILY
Qty: 30 TABLET | Refills: 0 | Status: SHIPPED | OUTPATIENT
Start: 2025-02-06 | End: 2025-03-08

## 2025-02-06 RX ORDER — ALOGLIPTIN 25 MG/1
25 TABLET, FILM COATED ORAL DAILY
COMMUNITY

## 2025-02-06 RX ADMIN — PNEUMOCOCCAL 20-VALENT CONJUGATE VACCINE 0.5 ML
2.2; 2.2; 2.2; 2.2; 2.2; 2.2; 2.2; 2.2; 2.2; 2.2; 2.2; 2.2; 2.2; 2.2; 2.2; 2.2; 4.4; 2.2; 2.2; 2.2 INJECTION, SUSPENSION INTRAMUSCULAR at 10:02

## 2025-02-06 RX ADMIN — INFLUENZA A VIRUS A/VICTORIA/4897/2022 IVR-238 (H1N1) ANTIGEN (FORMALDEHYDE INACTIVATED), INFLUENZA A VIRUS A/THAILAND/8/2022 IVR-237 (H3N2) ANTIGEN (FORMALDEHYDE INACTIVATED), INFLUENZA B VIRUS B/AUSTRIA/1359417/2021 BVR-26 ANTIGEN (FORMALDEHYDE INACTIVATED) 0.5 ML: 15; 15; 15 INJECTION, SUSPENSION INTRAMUSCULAR at 10:02

## 2025-02-06 NOTE — LETTER
AUTHORIZATION FOR RELEASE OF   CONFIDENTIAL INFORMATION    Dear Medical Records,    We are seeing Parth Lund, date of birth 1960, in the clinic at [facility]. Danita Natarajan MD is the patient's PCP. Parth Lund has an outstanding lab/procedure at the time we reviewed his chart. In order to help keep his health information updated, he has authorized us to request the following medical record(s):        (  )  MAMMOGRAM                                      (X)  COLONOSCOPY      (  )  PAP SMEAR                                          (  )  OUTSIDE LAB RESULTS     (  )  DEXA SCAN                                          (  )  EYE EXAM            (  )  FOOT EXAM                                          (  )  ENTIRE RECORD     (  )  OUTSIDE IMMUNIZATIONS                 (  )  _______________      ATTN: BEV      Please fax records to Danita Natarajan MD, [phone]        Patient Name: Parth Lund  : 1960  Patient Phone #: [phone]

## 2025-02-06 NOTE — PROGRESS NOTES
Assessment:       1. Rash and nonspecific skin eruption    2. Type 2 diabetes mellitus with hyperglycemia, without long-term current use of insulin    3. Hyperlipidemia, unspecified hyperlipidemia type    4. Primary hypertension    5. Prostate cancer screening    6. Encounter for colorectal cancer screening    7. Need for vaccination         Plan:       Rash and nonspecific skin eruption  -     cetirizine (ZYRTEC) 10 MG tablet; Take 1 tablet (10 mg total) by mouth once daily.  Dispense: 30 tablet; Refill: 0  -     Ambulatory referral/consult to Dermatology; Future; Expected date: 02/13/2025    Type 2 diabetes mellitus with hyperglycemia, without long-term current use of insulin  -     CBC Auto Differential; Future; Expected date: 02/06/2025  -     Comprehensive Metabolic Panel; Future; Expected date: 02/06/2025  -     Hemoglobin A1C; Future; Expected date: 02/06/2025  -     Microalbumin/Creatinine Ratio, Urine; Future; Expected date: 02/06/2025  -     Ambulatory Referral/Consult to Primary Care Diabetic Management; Future; Expected date: 02/13/2025  -     Ambulatory referral/consult to Optometry; Future; Expected date: 02/13/2025  -      DIABETES FOOT EXAM    Hyperlipidemia, unspecified hyperlipidemia type  -     CBC Auto Differential; Future; Expected date: 02/06/2025  -     Comprehensive Metabolic Panel; Future; Expected date: 02/06/2025  -     Lipid Panel; Future; Expected date: 02/06/2025    Primary hypertension  -     CBC Auto Differential; Future; Expected date: 02/06/2025  -     Comprehensive Metabolic Panel; Future; Expected date: 02/06/2025    Prostate cancer screening  -     PSA, Screening; Future; Expected date: 02/06/2025    Encounter for colorectal cancer screening    Need for vaccination  -     influenza (adjuvanted) (Fluad) 45 mcg/0.5 mL IM vaccine (> or = 66 yo) 0.5 mL  -     pneumoc 20-april conj-dip cr(PF) (PREVNAR-20 (PF)) injection Syrg 0.5 mL      Assessment & Plan    - Assessed patient's rash,  "likely contact dermatitis rather than medication-induced  - Evaluated diabetes management, noting poor control with fasting blood sugar of 217 mg/dL  - Considered complications of uncontrolled diabetes, including frequent urination  - Reviewed current medications, noting patient discontinued sitagliptin due to rash concerns  - Determined allogliptin (current medication) is in same drug class as sitagliptin, precluding concurrent use  - Assessed for diabetic complications, including neuropathy and retinopathy  - Evaluated vaccination status and need for preventive care    DIABETES:   Evaluated the patient's fasting blood sugar, which were reported to be 217 in the morning.   Noted frequent urination, especially at night.   Suspected high A1C level based on reported fasting blood sugar.   Performed physical exam to check for diabetic neuropathy.   Ordered updated labs and urine sample to check for diabetes-related complications.   Discussed dietary habits and educated the patient on proper diabetic diet.   Emphasized the importance of carbohydrate restriction in managing diabetes.   Discontinued sitagliptin.   Recommend continuing current medications and dramatically reducing carbohydrate intake.   Referred the patient to a diabetes management program.   Referred to optometry for diabetic eye exam.   Discussed elevated sugar content in certain foods like raisins and smoothies.   Clarified misconceptions about "healthy" foods that are elevated in carbohydrates (e.g., oatmeal with flavoring).   Educated on reading food labels for carbohydrate content.   Explained eggs as a safe, low-carb breakfast option.   Patient to dramatically reduce carbohydrate intake, especially bread, flour-based products, and foods made from wheat.   Recommend focusing on eating meats and vegetables.   Patient to avoid sugary foods, cakes, sweets, and sodas.   Recommend limiting intake of bread, rice, pasta, and other elevated-carb " foods.    CONTACT DERMATITIS:   Noted persistent rash for 4 months.   Suspected contact dermatitis rather than medication reaction.   Inquired about changes in soaps, detergents, or medications that might have caused the rash.   Recommend using unscented soap and fragrance-free laundry detergent.   Prescribed cetirizine (generic Zyrtec) 1 tablet daily for 1 month for rash.   Referred to dermatology for persistent rash.    SEBACEOUS CYST:   Noted presence of a cyst for approximately 8 years.   Examined the cyst and determined it's likely a sebaceous cyst.    FLU VACCINATION:   Administered flu vaccine.    PNEUMONIA VACCINATION:   Administered pneumonia vaccine.    HYPERLIPIDEMIA:   Continued atorvastatin for hyperlipidemia management.    HYPERTENSION:   Continued losartan 50 mg daily and amlodipine 10 mg daily for hypertension management.    LABS:   Ordered CBC, complete labs, and urinalysis today.    FOLLOW UP:   Scheduled follow-up with Dr. Pena in 2-3 months.   Scheduled follow up in a few months.   Instructed the patient to contact the office after lab results have been reviewed.   Planned referrals to dermatology, diabetes management, and optometry.    OTHER INSTRUCTIONS:   Identified the patient as a .   [facility]   Performed general physical exam.       Medication List with Changes/Refills   New Medications    CETIRIZINE (ZYRTEC) 10 MG TABLET    Take 1 tablet (10 mg total) by mouth once daily.   Current Medications    ALOGLIPTIN (NESINA) 25 MG TAB    Take 25 mg by mouth Daily.    AMLODIPINE (NORVASC) 10 MG TABLET    Take 1 tablet (10 mg total) by mouth every evening.    ATORVASTATIN (LIPITOR) 20 MG TABLET    Take 1 tablet (20 mg total) by mouth once daily.    BLOOD SUGAR DIAGNOSTIC STRP    1 each by Misc.(Non-Drug; Combo Route) route once daily.    EMPAGLIFLOZIN-METFORMIN 12.5-1,000 MG TBPH    Take 1 tablet by mouth Daily.    FLASH GLUCOSE SENSOR KIT    1 Device by  Misc.(Non-Drug; Combo Route) route every 14 (fourteen) days.    LANCETS MISC    1 each by Misc.(Non-Drug; Combo Route) route once daily.    LOSARTAN (COZAAR) 50 MG TABLET    Take 1 tablet (50 mg total) by mouth once daily.    ONETOUCH DELICA PLUS LANCET 33 GAUGE MISC    Apply topically.    ONETOUCH VERIO FLEX METER MISC    USE AS INSTRUCTED    SILDENAFIL (VIAGRA) 100 MG TABLET    Take 1 tablet (100 mg total) by mouth daily as needed for Erectile Dysfunction.   Discontinued Medications    GLYBURIDE (DIABETA) 5 MG TABLET    Take 2 tablets (10 mg total) by mouth 2 (two) times daily with meals.    METFORMIN (GLUCOPHAGE) 1000 MG TABLET    Take 1 tablet (1,000 mg total) by mouth 2 (two) times daily with meals.    SITAGLIPTIN PHOSPHATE (JANUVIA) 50 MG TAB    Take 1 tablet (50 mg total) by mouth once daily. For Diabetes.         Subjective:    Patient ID: Parth Lund is a 64 y.o. male.  Chief Complaint: Rash (Started two months ago. Pt believes the rash is being caused by Sitagliptin 100 mg tab.)    Rash      History of Present Illness    CHIEF COMPLAINT:  Patient presents today for evaluation of rashes    SKIN:  He reports minimal relief with cortisone cream application and no improvement with OTC generic antihistamine medications. He discontinued sitagliptin 3 months ago due to concerns about developing a rash.    DIABETES:  His morning blood sugar was 217. He experiences frequent urination during both day and night with difficulty holding urine. His diet includes foods incompatible with diabetic management, such as waffles for breakfast.    CURRENT MEDICATIONS:  He takes atorvastatin, losartan 50 mg daily, amlodipine 10 mg daily, and Viagra as needed.    MEDICAL HISTORY:  He has an 8-year history of an unchanged cyst and denies any history of kidney problems.    PREVENTIVE CARE:  [facility]. [facility] and has not received a flu vaccine this  "year.      ROS:  Genitourinary: +frequency  Integumentary: +rash  Allergic: +allergic reactions       Review of Systems   Skin:  Positive for rash.       Objective:      Vitals:    02/06/25 0938   BP: 138/66   BP Location: Right arm   Patient Position: Sitting   Pulse: 82   Resp: 18   Temp: 98.1 °F (36.7 °C)   TempSrc: Temporal   SpO2: 98%   Weight: 69.2 kg (152 lb 8.9 oz)   Height: 5' 8" (1.727 m)     BP Readings from Last 5 Encounters:   02/06/25 138/66   08/03/23 136/80   03/03/23 130/80   02/03/23 138/80   08/04/22 112/62     Wt Readings from Last 5 Encounters:   02/06/25 69.2 kg (152 lb 8.9 oz)   08/03/23 80 kg (176 lb 6.4 oz)   02/03/23 80.9 kg (178 lb 6.4 oz)   08/04/22 78.3 kg (172 lb 9.9 oz)   07/23/21 78 kg (171 lb 15.3 oz)     Physical Exam  Feet:      Right foot:      Protective Sensation: 10 sites tested.  10 sites sensed.      Skin integrity: Skin integrity normal. No ulcer or skin breakdown.      Left foot:      Protective Sensation: 10 sites tested.  10 sites sensed.      Skin integrity: Skin integrity normal. No ulcer or skin breakdown.       Physical Exam    General: Well-developed. Well-nourished. No acute distress.  Eyes: EOMI. Sclerae anicteric.  HENT: Normocephalic. Atraumatic. Nares patent. Moist oral mucosa.  Cardiovascular: Regular rate. Regular rhythm. No murmurs. No rubs. No gallops. Normal S1, S2.  Respiratory: Normal respiratory effort. Clear to auscultation bilaterally. No rales. No rhonchi. No wheezing.  Musculoskeletal: No  obvious deformity.  Extremities: No lower extremity edema.  Neurological: Alert & oriented x3. No slurred speech. Normal gait.  Psychiatric: Normal mood. Normal affect. Good insight. Good judgment.  Skin: Warm. Dry. No rash.         Lab Results   Component Value Date    WBC 4.31 11/01/2023    HGB 13.3 (L) 11/01/2023    HCT 42.1 11/01/2023     11/01/2023    CHOL 113 (L) 07/28/2023    TRIG 59 07/28/2023    HDL 40 07/28/2023    ALT 19 07/28/2023    AST 15 " 07/28/2023     07/28/2023    K 3.7 07/28/2023     07/28/2023    CREATININE 1.2 07/28/2023    BUN 9 07/28/2023    CO2 27 07/28/2023    TSH 0.521 07/27/2018    PSA 3.1 07/28/2023    HGBA1C 9.0 (H) 11/01/2023      This note was generated with the assistance of ambient listening technology. Verbal consent was obtained by the patient and accompanying visitor(s) for the recording of patient appointment to facilitate this note. I attest to having reviewed and edited the generated note for accuracy, though some syntax or spelling errors may persist. Please contact the author of this note for any clarification.

## 2025-02-06 NOTE — PROGRESS NOTES
Health Maintenance Due   Topic Date Due    Hemoglobin A1c  02/01/2024    TETANUS VACCINE  02/05/2024    Colorectal Cancer Screening  04/07/2024    Lipid Panel  07/28/2024    Diabetes Urine Screening  08/03/2024    PROSTATE-SPECIFIC ANTIGEN  11/01/2024    Diabetic Eye Exam  02/01/2025     Immunizations - reviewed and updated   Care Everywhere - triggered   Care Teams - updated   Outreach - Chart review done. Patient seen today by Dr. Rachel ABDALLA [facility] for [facility] recent [facility] records

## 2025-02-06 NOTE — PROGRESS NOTES
Verified pt ID using name and . Lauren Reyes, LPN. Administered Influenza in right deltoid and Prevnar 20 in left deltoid per physician order using aseptic technique. Aspirated and no blood return noted. Pt tolerated well with no adverse reactions noted.

## 2025-02-07 ENCOUNTER — PATIENT OUTREACH (OUTPATIENT)
Dept: ADMINISTRATIVE | Facility: HOSPITAL | Age: 65
End: 2025-02-07
Payer: MEDICARE

## 2025-02-07 ENCOUNTER — TELEPHONE (OUTPATIENT)
Dept: PRIMARY CARE CLINIC | Facility: CLINIC | Age: 65
End: 2025-02-07
Payer: MEDICARE

## 2025-02-07 NOTE — PROGRESS NOTES
Health Maintenance Due   Topic Date Due    TETANUS VACCINE  02/05/2024    Diabetes Urine Screening  08/03/2024    Diabetic Eye Exam  02/01/2025     Immunizations - reviewed and updated   Care Everywhere - triggered   Care Teams -   Outreach - Colonoscopy done 5/24/2024 uploaded to . HM updated

## 2025-02-07 NOTE — TELEPHONE ENCOUNTER
I called and sw pt, he vu that there was no documentation of who called him but I noticed he is due for an annual. Pt scheduled annual w Dr. Nvaas for 02/20/25.

## 2025-02-07 NOTE — TELEPHONE ENCOUNTER
----- Message from Maryellen sent at 2/7/2025  8:44 AM CST -----  Contact: Patient [phone]  .[email or web address]     Patient is calling for Medical Advice regarding:returned call    How long has patient had these symptoms:    Pharmacy name and phone#:    Patient wants a call back or thru [facility]:Patient [phone]    Comments:Patient [phone]    Please advise patient replies from provider may take up to 48 hours.

## 2025-02-11 ENCOUNTER — TELEPHONE (OUTPATIENT)
Dept: INTERNAL MEDICINE | Facility: CLINIC | Age: 65
End: 2025-02-11
Payer: MEDICARE

## 2025-02-11 ENCOUNTER — OFFICE VISIT (OUTPATIENT)
Dept: INTERNAL MEDICINE | Facility: CLINIC | Age: 65
End: 2025-02-11
Payer: MEDICARE

## 2025-02-11 VITALS
HEIGHT: 68 IN | SYSTOLIC BLOOD PRESSURE: 110 MMHG | HEART RATE: 74 BPM | BODY MASS INDEX: 22.88 KG/M2 | OXYGEN SATURATION: 98 % | DIASTOLIC BLOOD PRESSURE: 60 MMHG | WEIGHT: 151 LBS

## 2025-02-11 DIAGNOSIS — E11.65 TYPE 2 DIABETES MELLITUS WITH HYPERGLYCEMIA, WITHOUT LONG-TERM CURRENT USE OF INSULIN: Primary | ICD-10-CM

## 2025-02-11 PROCEDURE — 99215 OFFICE O/P EST HI 40 MIN: CPT | Mod: S$PBB,,,

## 2025-02-11 PROCEDURE — 99999 PR PBB SHADOW E&M-EST. PATIENT-LVL IV: CPT | Mod: PBBFAC,,,

## 2025-02-11 PROCEDURE — 99214 OFFICE O/P EST MOD 30 MIN: CPT | Mod: PBBFAC,PO

## 2025-02-11 RX ORDER — PEN NEEDLE, DIABETIC 30 GX3/16"
1 NEEDLE, DISPOSABLE MISCELLANEOUS NIGHTLY
Qty: 100 EACH | Refills: 5 | Status: SHIPPED | OUTPATIENT
Start: 2025-02-11

## 2025-02-11 RX ORDER — EMPAGLIFLOZIN, METFORMIN HYDROCHLORIDE 12.5; 1 MG/1; MG/1
1 TABLET, EXTENDED RELEASE ORAL DAILY
Qty: 90 TABLET | Refills: 2 | Status: SHIPPED | OUTPATIENT
Start: 2025-02-11

## 2025-02-11 RX ORDER — INSULIN DEGLUDEC 100 U/ML
10 INJECTION, SOLUTION SUBCUTANEOUS NIGHTLY
Qty: 9 ML | Refills: 11 | Status: SHIPPED | OUTPATIENT
Start: 2025-02-11 | End: 2026-02-11

## 2025-02-11 NOTE — PROGRESS NOTES
CHIEF COMPLAINT: Type 2 Diabetes    Referred by PCP: Dr. Natarajan  Previously managed by: PCP  CDE person of contact (if needed): Monica Self       HPI: Mr. Parth Lund is a 64 y.o. male who was diagnosed with Type 2 DM in 8 yrs.     Last A1c: 11.0% (2/2025)    Hx of hospitalization for DM? No   Hx of HTN? Yes   Hx of CKD? No   Hx of CHF? No   Hx of CVA? No   Hx of MI? No   Hx of Pancreatitis? No   Hx of DM neuropathy? No   Hx DR? No   Hx of Gastroparesis? No       VISIT SUMMARY:  Wife Jose Juan present during visit.  Reports daily POC (FBG), but did not check today.  Patient is lean (Wt 151#). Not a candidate for GLP-1 due to wt loss SE of  those drugs.    Reports diet previously were mostly sweets, but has made changes over the past week or 2.   Reports stopped eating carbs (bread, rice) etc.  UACR needs to be updated.  Reports taking Synjardy once daily due to diarrhea. Strength switched to Synjardy XR  Reports upcoming eye exam appt in 3/2025  Discussed starting basal insulin.  Script for Dexcom CGM sent to [facility]      BG monitoring:   Name: Accu check  How often: daily  BG range: 171-252    Lifestyle:   Diet: Has made diet changes  Exercise: no regular exercise     Previous Diabetes Meds Tried:  Glipizide-not effective    Current Diabetic Meds:   Synjardy 12.5-1000mg daily      Diabetes Management Status:  Statin: Taking  ACE/ARB: Taking    Foot exam due: 2/6/2026  Eye exam due: upcoming appt in 3/2025      Screening or Prevention Patient's value Goal Complete/Controlled?   HgA1C Testing and Control   Lab Results   Component Value Date    HGBA1C 11.0 (H) 02/06/2025      Annually/Less than 8% No   Lipid profile : 02/06/2025 Annually Yes   LDL control Lab Results   Component Value Date    LDLCALC 71.0 02/06/2025    Annually/Less than 100 mg/dl  Yes   Nephropathy screening Lab Results   Component Value Date    LABMICR 8.0 08/03/2023     Lab Results   Component Value Date    PROTEINUA Negative 08/03/2023     "Annually No   Blood pressure BP Readings from Last 1 Encounters:   02/11/25 110/60    Less than 140/90 Yes   Dilated retinal exam : 02/01/2024 Annually No   Foot exam   : 02/06/2025 Annually Yes     REVIEW OF SYSTEMS  General: Denies weakness, fatigue, or weight changes.   Eyes: Denies double or blurred vision, eye pain, or redness.  Cardiovascular: Denies CP, palpitations, or edema.   Respiratory: Denies cough or dyspnea.   GI: Denies heartburn, n/v, or changes in bowel patterns.   Skin: Denies rash, lesions, itching.  Neuro: Denies severe HAs, numbness, tingling, tremors, or vertigo.   Endocrine: Denies polyuria, polydipsia, polyphagia, heat or cold intolerance.     Vital Signs  /60 (BP Location: Right arm, Patient Position: Sitting)   Pulse 74   Ht 5' 8" (1.727 m)   Wt 68.5 kg (151 lb 0.2 oz)   SpO2 98%   BMI 22.96 kg/m²     Hemoglobin A1C   Date Value Ref Range Status   02/06/2025 11.0 (H) 4.0 - 5.6 % Final     Comment:     ADA Screening Guidelines:  5.7-6.4%  Consistent with prediabetes  >or=6.5%  Consistent with diabetes    High levels of fetal hemoglobin interfere with the HbA1C  assay. Heterozygous hemoglobin variants (HbS, HgC, etc)do  not significantly interfere with this assay.   However, presence of multiple variants may affect accuracy.     11/01/2023 9.0 (H) 4.0 - 5.6 % Final     Comment:     ADA Screening Guidelines:  5.7-6.4%  Consistent with prediabetes  >or=6.5%  Consistent with diabetes    High levels of fetal hemoglobin interfere with the HbA1C  assay. Heterozygous hemoglobin variants (HbS, HgC, etc)do  not significantly interfere with this assay.   However, presence of multiple variants may affect accuracy.     07/28/2023 9.5 (H) 4.0 - 5.6 % Final     Comment:     ADA Screening Guidelines:  5.7-6.4%  Consistent with prediabetes  >or=6.5%  Consistent with diabetes    High levels of fetal hemoglobin interfere with the HbA1C  assay. Heterozygous hemoglobin variants (HbS, HgC, etc)do  not " significantly interfere with this assay.   However, presence of multiple variants may affect accuracy.          Chemistry        Component Value Date/Time     02/06/2025 1020    K 4.0 02/06/2025 1020     02/06/2025 1020    CO2 25 02/06/2025 1020    BUN 17 02/06/2025 1020    CREATININE 1.2 02/06/2025 1020     (H) 02/06/2025 1020        Component Value Date/Time    CALCIUM 9.8 02/06/2025 1020    ALKPHOS 184 (H) 02/06/2025 1020    AST 23 02/06/2025 1020    ALT 31 02/06/2025 1020    BILITOT 0.3 02/06/2025 1020    ESTGFRAFRICA >60.0 07/22/2022 0900    EGFRNONAA >60.0 07/22/2022 0900           Lab Results   Component Value Date    TSH 0.521 07/27/2018      Lab Results   Component Value Date    CHOL 128 02/06/2025    CHOL 113 (L) 07/28/2023    CHOL 112 (L) 07/22/2022     Lab Results   Component Value Date    HDL 44 02/06/2025    HDL 40 07/28/2023    HDL 47 07/22/2022     Lab Results   Component Value Date    LDLCALC 71.0 02/06/2025    LDLCALC 61.2 (L) 07/28/2023    LDLCALC 57.2 (L) 07/22/2022     Lab Results   Component Value Date    TRIG 65 02/06/2025    TRIG 59 07/28/2023    TRIG 39 07/22/2022     Lab Results   Component Value Date    CHOLHDL 34.4 02/06/2025    CHOLHDL 35.4 07/28/2023    CHOLHDL 42.0 07/22/2022         PHYSICAL EXAMINATION  Constitutional: Appears well, no distress.  Eyes: Sclera white, PERRLA, EOMs intact.  Neck: Supple, trachea midline.   Respiratory: No cough, SOB, nor CARRANZA.  Cardiovascular: RRR; no edema.  Skin: Warm and dry; no rash, lesions, acanthosis nigricans  Neuro: AAOx4, steady gait  Psychiatric: No unusual, disruptive, or inappropriate behavior.       Assessment/Plan  1. Type 2 diabetes mellitus with hyperglycemia, without long-term current use of insulin  Overview:  No retinopathy    Orders:  -     Ambulatory Referral/Consult to Primary Care Diabetic Management  -     Microalbumin/Creatinine Ratio, Urine; Future; Expected date: 02/11/2025  -     empagliflozin-metformin  "(SYNJARDY XR) 12.5-1,000 mg TBph; Take 1 tablet by mouth once daily.  Dispense: 90 tablet; Refill: 2  -     insulin degludec (TRESIBA FLEXTOUCH U-100) 100 unit/mL (3 mL) insulin pen; Inject 10 Units into the skin every evening.  Dispense: 9 mL; Refill: 11  -     pen needle, diabetic (BD KAREEN 2ND GEN PEN NEEDLE) 32 gauge x 5/32" Ndle; 1 Needle by Misc.(Non-Drug; Combo Route) route every evening.  Dispense: 100 each; Refill: 5    -Reviewed last A1c.  11.0%  -Avoid GLP- 1 at this time.  -Discussed A1c goal <8%.  -Discussed BG goals, provided copy.  -Continue POC at least 1 -2 times daily. Advised to bring BG log or glucometer to every visit.  -Start Dexcom G7 CGM monitoring.  Script sent to [facility]  -Provided brochures on meal planning.  -Start Tresiba 10 units at bedtime.  -Start Synjardy XR 12.5-1000mg daily.  Discussed MOA, SE, and dosage.  -Discussed goal to wean insulin to a minimal dose w/o experiencing hypo or hyperglycemia.   -Update you UACR  -Re-eval labs in 3 months.      FOLLOW UP  Follow up in about 4 weeks (around 3/11/2025).        "

## 2025-02-11 NOTE — PATIENT INSTRUCTIONS
Start checking your blood sugars at least 1-2 times daily. Be sure to write down your blood glucose readings and bring the log paper or your glucometer to every visit.    Blood glucose goals:   Fasting blood glucose goal:  mg/dL.  Premeal blood glucose goal:  mg/dL.  Postmeal blood glucose goal ( 2 hrs): <180 mg/dL.  Bedtime / overnight blood glucose goal:  mg/dL.     Start limiting carbs, fats, and sugar in your diet.    Refer to the food brochures when planning meals.      Start taking Synjardy XR strength daily.    Start Tresiba at 10units at bedtime and monitor your morning blood sugars closely.  Notify the office if you start to experience hypoglycemia.      F/u in 4 weeks on 3/11/2025 at 8:30am.     Someone from [facility] will be contacting you regarding your Dexcom order.

## 2025-02-11 NOTE — TELEPHONE ENCOUNTER
Attempted to call patient 3 times to reschedule appointment with diabetic provider Bradford Vincent NP due to being scheduled incorrectly. Patient didn't answer, left message to call office.

## 2025-02-12 ENCOUNTER — TELEPHONE (OUTPATIENT)
Dept: INTERNAL MEDICINE | Facility: CLINIC | Age: 65
End: 2025-02-12
Payer: MEDICARE

## 2025-02-12 NOTE — TELEPHONE ENCOUNTER
Attempted to call patient in regards to message. Patient will have to call pharmacy to have prescription transferred to where he prefers, patient didn't answer. Left detailed message.  ----- Message from DYLAN Servin sent at 2/12/2025  3:26 PM CST -----  Contact: [phone] Patient  Please call and tell patient he has to call the pharmacy and have them forward his prescription to the pharmacy her prefers.  Thanks  ----- Message -----  From: Lizbeth Uriostegui MA  Sent: 2/12/2025   3:01 PM CST  To: DYLAN Parker      ----- Message -----  From: Tara Escalona  Sent: 2/12/2025   2:42 PM CST  To: Don Felder Staff    .[email or web address]     Patient is calling for Medical Advice regarding: Pt in need of following Rx to be resent to new pharmacy listed below.    insulin degludec (TRESIBA FLEXTOUCH U-100) 100 unit/mL (3 mL) insulin pen 9 mL 11 2/11/2025 2/11/2026 No    Pt states other pharmacy can't fill at this time.    How long has patient had these symptoms:    Pharmacy name and phone#:  [id number] [place] - [address] [facility]  [address]  [facility] [address]  Phone: [phone] Fax: [phone]      Patient wants a call back or thru [facility]:  call back     Comments: Pt would like a call once completed    Please advise patient replies from provider may take up to 48 hours.

## 2025-02-17 DIAGNOSIS — E11.65 TYPE 2 DIABETES MELLITUS WITH HYPERGLYCEMIA, WITHOUT LONG-TERM CURRENT USE OF INSULIN: Primary | ICD-10-CM

## 2025-02-17 RX ORDER — INSULIN DEGLUDEC 100 U/ML
10 INJECTION, SOLUTION SUBCUTANEOUS NIGHTLY
Qty: 9 ML | Refills: 11 | Status: SHIPPED | OUTPATIENT
Start: 2025-02-17 | End: 2026-02-17

## 2025-02-17 NOTE — TELEPHONE ENCOUNTER
----- Message from Med Assistant Lizbeth sent at 2/13/2025 11:17 AM CST -----  Contact: [phone]  Per patient pharmacy will not transfer prescription. Please advise, thank you.  ----- Message -----  From: Pam Patel  Sent: 2/13/2025  11:08 AM CST  To: Don Felder Staff    Requesting an RX refill or new RX.    Is this a refill or new RX:  Refill     RX name and strength (copy/paste from chart):  insulin degludec (TRESIBA FLEXTOUCH U-100) 100 unit/mL (3 mL) insulin pen     Is this a 30 day or 90 day RX: 90    Pharmacy name and phone # (copy/paste from chart):    [id number] - [place], [place] - [address]  [address]  [facility] [address]  Phone: [phone] Fax: [phone]    Pharm Will not tranfer

## 2025-02-17 NOTE — TELEPHONE ENCOUNTER
----- Message from Triny sent at 2/17/2025  9:17 AM CST -----  Contact: [phone]  Requesting an RX refill or new RX.Is this a refill or new RX: refillRX name and strength (copy/paste from chart):  insulin degludec (TRESIBA FLEXTOUCH U-100) 100 unit/mL (3 mL) insulin penIs this a 30 day or 90 day RX: 11Pharmacy name and phone # (copy/paste from chart):[facility] [address] [facility] [place] [address] doctors have asked that we provide their patients with the following 2 reminders -- prescription refills can take up to 72 hours, and a friendly reminder that in the future you can use your [facility] account to request refills:

## 2025-02-19 DIAGNOSIS — I10 ESSENTIAL HYPERTENSION: ICD-10-CM

## 2025-02-19 DIAGNOSIS — E78.5 HYPERLIPIDEMIA, UNSPECIFIED HYPERLIPIDEMIA TYPE: ICD-10-CM

## 2025-02-19 RX ORDER — LOSARTAN POTASSIUM 50 MG/1
50 TABLET ORAL DAILY
Qty: 90 TABLET | Refills: 1 | OUTPATIENT
Start: 2025-02-19

## 2025-02-19 RX ORDER — ATORVASTATIN CALCIUM 20 MG/1
20 TABLET, FILM COATED ORAL DAILY
Qty: 90 TABLET | Refills: 3 | OUTPATIENT
Start: 2025-02-19

## 2025-02-19 NOTE — TELEPHONE ENCOUNTER
----- Message from Sumaya sent at 2/19/2025  9:44 AM CST -----  Contact: [phone]  Requesting an RX refill or new RX.Is this a refill or new RX: refillRX name and strength   atorvastatin (LIPITOR) 20 MG tablet 90 tabletIs this a 30 day or 90 day RX: 90Pharmacy name and phone #   [facility] #[id number] - [place], [place] - [address] Phone: [phone]: [phone] doctors have asked that we provide their patients with the following 2 reminders -- prescription refills can take up to 72 hours, and a friendly reminder that in the future you can use your [facility] account to request refills: yesRequesting an RX refill or new RX.Is this a refill or new RX: refillRX name and strength   losartan (COZAAR) 50 MG tablet 90 tabletIs this a 30 day or 90 day RX: 90Pharmacy name and phone #   [facility] #[id number] - [place], [place] - [address] Phone: [phone]: [phone] doctors have asked that we provide their patients with the following 2 reminders -- prescription refills can take up to 72 hours, and a friendly reminder that in the future you can use your [facility] account to request refills: yes

## 2025-02-19 NOTE — TELEPHONE ENCOUNTER
No care due was identified.  [facility] Embedded Care Due Messages. Reference number: [id number].   2/19/2025 9:51:33 AM CST

## 2025-02-20 ENCOUNTER — OFFICE VISIT (OUTPATIENT)
Dept: PRIMARY CARE CLINIC | Facility: CLINIC | Age: 65
End: 2025-02-20
Payer: MEDICARE

## 2025-02-20 ENCOUNTER — LAB VISIT (OUTPATIENT)
Dept: LAB | Facility: HOSPITAL | Age: 65
End: 2025-02-20
Attending: INTERNAL MEDICINE
Payer: MEDICARE

## 2025-02-20 VITALS
OXYGEN SATURATION: 99 % | HEIGHT: 68 IN | SYSTOLIC BLOOD PRESSURE: 136 MMHG | HEART RATE: 95 BPM | DIASTOLIC BLOOD PRESSURE: 68 MMHG | BODY MASS INDEX: 23.19 KG/M2 | WEIGHT: 153 LBS | RESPIRATION RATE: 19 BRPM

## 2025-02-20 DIAGNOSIS — I10 ESSENTIAL HYPERTENSION: ICD-10-CM

## 2025-02-20 DIAGNOSIS — E11.65 TYPE 2 DIABETES MELLITUS WITH HYPERGLYCEMIA, WITH LONG-TERM CURRENT USE OF INSULIN: Primary | ICD-10-CM

## 2025-02-20 DIAGNOSIS — R74.8 ELEVATED ALKALINE PHOSPHATASE LEVEL: ICD-10-CM

## 2025-02-20 DIAGNOSIS — Z79.4 TYPE 2 DIABETES MELLITUS WITH HYPERGLYCEMIA, WITH LONG-TERM CURRENT USE OF INSULIN: Primary | ICD-10-CM

## 2025-02-20 DIAGNOSIS — E78.5 HYPERLIPIDEMIA, UNSPECIFIED HYPERLIPIDEMIA TYPE: ICD-10-CM

## 2025-02-20 DIAGNOSIS — Z29.11 NEED FOR RSV VACCINATION: ICD-10-CM

## 2025-02-20 DIAGNOSIS — R63.4 WEIGHT LOSS, UNINTENTIONAL: ICD-10-CM

## 2025-02-20 LAB
ALBUMIN/CREAT UR: 5.5 UG/MG (ref 0–30)
CREAT UR-MCNC: 91 MG/DL (ref 23–375)
GGT SERPL-CCNC: 29 U/L (ref 8–55)
MICROALBUMIN UR DL<=1MG/L-MCNC: 5 UG/ML
TSH SERPL DL<=0.005 MIU/L-ACNC: 0.82 UIU/ML (ref 0.4–4)

## 2025-02-20 PROCEDURE — 84075 ASSAY ALKALINE PHOSPHATASE: CPT | Performed by: INTERNAL MEDICINE

## 2025-02-20 PROCEDURE — 82977 ASSAY OF GGT: CPT | Performed by: INTERNAL MEDICINE

## 2025-02-20 PROCEDURE — 84443 ASSAY THYROID STIM HORMONE: CPT | Performed by: INTERNAL MEDICINE

## 2025-02-20 PROCEDURE — 82043 UR ALBUMIN QUANTITATIVE: CPT | Performed by: INTERNAL MEDICINE

## 2025-02-20 PROCEDURE — 99214 OFFICE O/P EST MOD 30 MIN: CPT | Mod: PBBFAC,PN | Performed by: INTERNAL MEDICINE

## 2025-02-20 RX ORDER — AMLODIPINE BESYLATE 10 MG/1
10 TABLET ORAL DAILY
Qty: 90 TABLET | Refills: 1 | Status: SHIPPED | OUTPATIENT
Start: 2025-02-20

## 2025-02-20 RX ORDER — ATORVASTATIN CALCIUM 20 MG/1
20 TABLET, FILM COATED ORAL DAILY
Qty: 90 TABLET | Refills: 1 | Status: SHIPPED | OUTPATIENT
Start: 2025-02-20

## 2025-02-20 RX ORDER — LOSARTAN POTASSIUM 50 MG/1
50 TABLET ORAL DAILY
Qty: 90 TABLET | Refills: 1 | Status: SHIPPED | OUTPATIENT
Start: 2025-02-20

## 2025-02-21 NOTE — PROGRESS NOTES
Subjective     Patient ID: Parth Lund is a 64 y.o. male.    Chief Complaint: Annual Exam    Last seen by me 18 months ago. [facility] but will not be going there any longer. Had seen one of my partners here two weeks ago with labs showing diabetes severely uncontrolled. He was referred to Diabetes Management, NP Carlton has prescribed Tresiba insulin which he hasn't picked up yet (prescription coverage on his new insurance starts March 1st.), and Synjardy.     PMH:   Hypertension.   Diabetes type 2, HbA1c 9.0% Nov. '23.  Hyperlipidemia, LDL 61 July '23.  Erectile Dysfunction.    PSH: None.     [facility], reports one polyp removed. Eye exam is scheduled soon. Vaccines reviewed.    Social: Non-smoker, social alcohol. , 4 children. Retired .    FMH: HTN, DM, Chol, Stroke, Heart dis, Throat cancer.     NKDA.     Medications: list reviewed and reconciled.     Review of Systems   Constitutional:  Positive for unexpected weight change. Negative for activity change, appetite change, chills, diaphoresis, fatigue and fever.        Regarding weight loss, he reports good appetite, no increase in physical activity.   HENT:  Negative for nasal congestion, ear pain, hearing loss, postnasal drip, rhinorrhea, sore throat, trouble swallowing and voice change.    Eyes:  Negative for pain and visual disturbance.   Respiratory:  Negative for apnea, cough, chest tightness, shortness of breath and wheezing.    Cardiovascular:  Negative for chest pain, palpitations and leg swelling.   Gastrointestinal:  Negative for abdominal pain, blood in stool, constipation, diarrhea, nausea and vomiting.   Genitourinary:  Negative for difficulty urinating, dysuria, frequency, hematuria and urgency.   Musculoskeletal:  Negative for back pain, gait problem, myalgias, neck pain and neck stiffness.        Mild intermittent bilateral shoulder pain, does not routinely use  "analgesics for it.   Integumentary:  Negative for color change and rash.   Neurological:  Negative for dizziness, seizures, syncope, weakness, numbness and headaches.   Hematological:  Negative for adenopathy. Does not bruise/bleed easily.   Psychiatric/Behavioral:  Negative for agitation, confusion, dysphoric mood and sleep disturbance. The patient is not nervous/anxious.           Objective   Vitals:    02/20/25 1518   BP: 136/68   BP Location: Right arm   Patient Position: Sitting   Pulse: 95   Resp: 19   SpO2: 99%   Weight: 69.4 kg (153 lb)      From 176 eighteen months ago.    Height: 5' 8" (1.727 m)   BMI=23.3  Physical Exam  Constitutional:       General: He is not in acute distress.     Appearance: He is well-developed. He is not ill-appearing or diaphoretic.   HENT:      Head: Normocephalic and atraumatic.      Right Ear: Tympanic membrane, ear canal and external ear normal.      Left Ear: Tympanic membrane, ear canal and external ear normal.      Nose: Nose normal. No congestion.      Mouth/Throat:      Mouth: Mucous membranes are moist.      Pharynx: Oropharynx is clear.   Eyes:      General: No scleral icterus.     Extraocular Movements: Extraocular movements intact.      Conjunctiva/sclera: Conjunctivae normal.      Pupils: Pupils are equal, round, and reactive to light.   Neck:      Thyroid: No thyromegaly.      Vascular: No carotid bruit or JVD.   Cardiovascular:      Rate and Rhythm: Normal rate and regular rhythm.      Pulses: Normal pulses.      Heart sounds: Normal heart sounds. No murmur heard.     No friction rub. No gallop.   Pulmonary:      Effort: Pulmonary effort is normal. No respiratory distress.      Breath sounds: Normal breath sounds. No wheezing, rhonchi or rales.   Abdominal:      General: Bowel sounds are normal. There is no distension.      Palpations: Abdomen is soft. There is no mass.      Tenderness: There is no abdominal tenderness.      Hernia: No hernia is present. "   Musculoskeletal:         General: No tenderness or deformity. Normal range of motion.      Cervical back: Normal range of motion and neck supple.      Right lower leg: No edema.      Left lower leg: No edema.   Lymphadenopathy:      Cervical: No cervical adenopathy.   Skin:     General: Skin is warm and dry.      Findings: No rash.   Neurological:      General: No focal deficit present.      Mental Status: He is alert and oriented to person, place, and time.      Cranial Nerves: No cranial nerve deficit.      Motor: No weakness or abnormal muscle tone.      Coordination: Coordination normal.      Gait: Gait normal.   Psychiatric:         Mood and Affect: Mood and affect normal.         Speech: Speech normal.         Behavior: Behavior normal.         Thought Content: Thought content normal.         Judgment: Judgment normal.     Patient Outreach on 02/07/2025   Component Date Value    CRC Recommendation Exter* 05/24/2024 No follow-up frequency specified    Lab Visit on 02/06/2025   Component Date Value    WBC 02/06/2025 5.24     RBC 02/06/2025 4.70     Hemoglobin 02/06/2025 13.2 (L)     Hematocrit 02/06/2025 42.2     MCV 02/06/2025 90     MCH 02/06/2025 28.1     MCHC 02/06/2025 31.3 (L)     RDW 02/06/2025 13.2     Platelets 02/06/2025 245     MPV 02/06/2025 11.7     Immature Granulocytes 02/06/2025 0.2     Gran # (ANC) 02/06/2025 3.4     Immature Grans (Abs) 02/06/2025 0.01     Lymph # 02/06/2025 0.9 (L)     Mono # 02/06/2025 0.6     Eos # 02/06/2025 0.3     Baso # 02/06/2025 0.05     nRBC 02/06/2025 0     Gran % 02/06/2025 64.7     Lymph % 02/06/2025 17.9 (L)     Mono % 02/06/2025 10.9     Eosinophil % 02/06/2025 5.3     Basophil % 02/06/2025 1.0     Differential Method 02/06/2025 Automated     Sodium 02/06/2025 138     Potassium 02/06/2025 4.0     Chloride 02/06/2025 103     CO2 02/06/2025 25     Glucose 02/06/2025 223 (H)     BUN 02/06/2025 17     Creatinine 02/06/2025 1.2      Calcium 02/06/2025 9.8     Total Protein 02/06/2025 7.2     Albumin 02/06/2025 3.7     Total Bilirubin 02/06/2025 0.3     Alkaline Phosphatase 02/06/2025 184 (H)     AST 02/06/2025 23     ALT 02/06/2025 31     eGFR 02/06/2025 >60.0     Anion Gap 02/06/2025 10     Cholesterol 02/06/2025 128     Triglycerides 02/06/2025 65     HDL 02/06/2025 44     LDL Cholesterol 02/06/2025 71.0     HDL/Cholesterol Ratio 02/06/2025 34.4     Total Cholesterol/HDL Ra* 02/06/2025 2.9     Non-HDL Cholesterol 02/06/2025 84     Hemoglobin A1C 02/06/2025 11.0 (H)     Estimated Avg Glucose 02/06/2025 269 (H)     PSA, Screen 02/06/2025 1.9         Assessment and Plan     1. Type 2 diabetes mellitus with hyperglycemia, with long-term current use of insulin  -     Microalbumin/Creatinine Ratio, Urine  -     Synjardy, Tresiba, f/u with Diabetes Management as scheduled, Eye exam as scheduled.    2. Essential hypertension near goal, continue same  -     amLODIPine (NORVASC) 10 MG tablet; Take 1 tablet (10 mg total) by mouth once daily.  Dispense: 90 tablet; Refill: 1  -     losartan (COZAAR) 50 MG tablet; Take 1 tablet (50 mg total) by mouth once daily.  Dispense: 90 tablet; Refill: 1    3. Hyperlipidemia, unspecified hyperlipidemia type at goal  -     atorvastatin (LIPITOR) 20 MG tablet; Take 1 tablet (20 mg total) by mouth once daily.  Dispense: 90 tablet; Refill: 1    4. Elevated alkaline phosphatase level  -     ALKALINE PHOSPHATASE, ISOENZYMES; Future; Expected date: 02/20/2025  -     GAMMA GT; Future; Expected date: 02/20/2025    5. Weight loss, unintentional  -     TSH; Future; Expected date: 02/20/2025    6. Need for RSV vaccination - RSV vaccine recommended today.    COVID booster declined.       Follow up in about 4 months (around 6/20/2025).

## 2025-02-26 ENCOUNTER — RESULTS FOLLOW-UP (OUTPATIENT)
Dept: PRIMARY CARE CLINIC | Facility: CLINIC | Age: 65
End: 2025-02-26

## 2025-02-27 LAB — ALP SERPL-CCNC: 145 U/L (ref 35–144)

## 2025-02-28 ENCOUNTER — TELEPHONE (OUTPATIENT)
Dept: PRIMARY CARE CLINIC | Facility: CLINIC | Age: 65
End: 2025-02-28
Payer: MEDICARE

## 2025-02-28 NOTE — TELEPHONE ENCOUNTER
----- Message from Chiquita sent at 2/28/2025  1:03 PM CST -----  Regarding: missed call [facility]  Contact: Patient can be contacted @# [phone]  PT called returning call from [facility]. Please reach out to patient at earliest opportunityPatient can be contacted @# [phone]

## 2025-03-05 ENCOUNTER — TELEPHONE (OUTPATIENT)
Dept: PRIMARY CARE CLINIC | Facility: CLINIC | Age: 65
End: 2025-03-05
Payer: MEDICARE

## 2025-03-05 NOTE — TELEPHONE ENCOUNTER
Blood and urine tests done 2/20 are all acceptable, nothing seen associated with his weight loss. This may have been due to uncontrolled diabetes, has he started the new Insulin ordered by Endocrine NP Carlton? Has a follow up visit there next week.

## 2025-03-05 NOTE — TELEPHONE ENCOUNTER
----- Message from Yuridia sent at 3/5/2025  4:33 PM CST -----  Regarding: Results  Contact: [phone]  Type:  Needs Medical AdviceWho Called: Gaudencioould the patient rather a call back or a response via [facility]? callBe Call Back Number: [phone] Information: Calling to speak with provider regarding recent lab and imaging results and plan of care going forward. Please call and discuss.

## 2025-03-06 ENCOUNTER — TELEPHONE (OUTPATIENT)
Dept: PRIMARY CARE CLINIC | Facility: CLINIC | Age: 65
End: 2025-03-06
Payer: MEDICARE

## 2025-03-06 NOTE — TELEPHONE ENCOUNTER
Called pt to give him message in regards to recent labs done on 2/20. Pt did not answer. LVM informing him to give the clinic a call back.

## 2025-03-06 NOTE — TELEPHONE ENCOUNTER
Spoke with pt in regards to recent labs, pt states that he has started the insulin prescribed by endocrine. Pt verbally understood results and will keep his appointment with endocrine for next week.

## 2025-03-11 ENCOUNTER — OFFICE VISIT (OUTPATIENT)
Dept: INTERNAL MEDICINE | Facility: CLINIC | Age: 65
End: 2025-03-11
Payer: MEDICARE

## 2025-03-11 VITALS
DIASTOLIC BLOOD PRESSURE: 68 MMHG | OXYGEN SATURATION: 97 % | HEIGHT: 68 IN | WEIGHT: 155.88 LBS | SYSTOLIC BLOOD PRESSURE: 138 MMHG | BODY MASS INDEX: 23.63 KG/M2 | HEART RATE: 88 BPM

## 2025-03-11 DIAGNOSIS — E11.65 TYPE 2 DIABETES MELLITUS WITH HYPERGLYCEMIA, WITHOUT LONG-TERM CURRENT USE OF INSULIN: Primary | ICD-10-CM

## 2025-03-11 DIAGNOSIS — Z97.8 USES SELF-APPLIED CONTINUOUS GLUCOSE MONITORING DEVICE: ICD-10-CM

## 2025-03-11 DIAGNOSIS — R21 RASH AND NONSPECIFIC SKIN ERUPTION: ICD-10-CM

## 2025-03-11 PROCEDURE — 99999 PR PBB SHADOW E&M-EST. PATIENT-LVL IV: CPT | Mod: PBBFAC,,,

## 2025-03-11 PROCEDURE — 99214 OFFICE O/P EST MOD 30 MIN: CPT | Mod: PBBFAC,PO

## 2025-03-11 RX ORDER — SEMAGLUTIDE 0.68 MG/ML
0.25 INJECTION, SOLUTION SUBCUTANEOUS
Qty: 3 ML | Refills: 3 | Status: SHIPPED | OUTPATIENT
Start: 2025-03-11

## 2025-03-11 RX ORDER — INSULIN DEGLUDEC 100 U/ML
12 INJECTION, SOLUTION SUBCUTANEOUS NIGHTLY
Qty: 9 ML | Refills: 3
Start: 2025-03-11 | End: 2026-03-11

## 2025-03-11 RX ORDER — EMPAGLIFLOZIN, METFORMIN HYDROCHLORIDE 12.5; 1 MG/1; MG/1
1 TABLET, EXTENDED RELEASE ORAL DAILY
Qty: 90 TABLET | Refills: 2 | Status: CANCELLED | OUTPATIENT
Start: 2025-03-11

## 2025-03-11 NOTE — PROGRESS NOTES
CHIEF COMPLAINT: Type 2 Diabetes    Referred by PCP: Dr. Natarajan  Previously managed by: PCP  CDE person of contact (if needed): Monica Self       HPI: Mr. Parth Lund is a 64 y.o. male who was diagnosed with Type 2 DM in 8 yrs.     Last A1c: 11.0% (2/2025)    Hx of hospitalization for DM? No   Hx of HTN? Yes   Hx of CKD? No   Hx of CHF? No   Hx of CVA? No   Hx of MI? No   Hx of Pancreatitis? No   Hx of DM neuropathy? No   Hx DR? No   Hx of Gastroparesis? No       VISIT SUMMARY:  Wife Jose Juan present during visit.  Has Dexcom G7   Dexcom report showed diabetes poorly controlled inspite of taking basal insulin.   Reports has not started Synjardy-XR 12.5-1000mg [facility].   Rx for Synjardy-XR called in to [facility] during visit.  Reports no longer experiencing diarrhea  Reports has made some diet changes.  Reports tolerating Tresiba well   Reports upcoming eye exam appt in 3/19/2025  Wt today 155#  Discussed starting Ozempic      BG monitoring:   Name: Dexcom G7  BG av: 255    Lifestyle:   Diet: Has made diet changes  Exercise: stays active around the home    Previous Diabetes Meds Tried:  Glipizide-not effective    Current Diabetic Meds:   Synjardy 12.5-1000mg daily  Alogliptin 25mg daily      Diabetes Management Status:  Statin: Taking  ACE/ARB: Taking    Foot exam due: 2/6/2026  Eye exam due: upcoming appt in 3/2025      Screening or Prevention Patient's value Goal Complete/Controlled?   HgA1C Testing and Control   Lab Results   Component Value Date    HGBA1C 11.0 (H) 02/06/2025      Annually/Less than 8% No   Lipid profile : 02/06/2025 Annually Yes   LDL control Lab Results   Component Value Date    LDLCALC 71.0 02/06/2025    Annually/Less than 100 mg/dl  Yes   Nephropathy screening Lab Results   Component Value Date    LABMICR 5.0 02/20/2025     Lab Results   Component Value Date    PROTEINUA Negative 08/03/2023    Annually No   Blood pressure BP Readings from Last 1 Encounters:  "  03/11/25 138/68    Less than 140/90 Yes   Dilated retinal exam : 02/01/2024 Annually No   Foot exam   : 02/06/2025 Annually Yes     REVIEW OF SYSTEMS  General: Denies weakness, fatigue, or weight changes.   Eyes: Denies double or blurred vision, eye pain, or redness.  Cardiovascular: Denies CP, palpitations, or edema.   Respiratory: Denies cough or dyspnea.   GI: Denies heartburn, n/v, or changes in bowel patterns.   Skin: Denies rash, lesions, itching at injection sites.  Neuro: Denies severe HAs, numbness, tingling, tremors, or vertigo.   Endocrine: Denies polyuria, polydipsia, polyphagia, heat or cold intolerance.     Vital Signs  /68 (BP Location: Right arm, Patient Position: Sitting)   Pulse 88   Ht 5' 8" (1.727 m)   Wt 70.7 kg (155 lb 13.8 oz)   SpO2 97%   BMI 23.70 kg/m²     Hemoglobin A1C   Date Value Ref Range Status   02/06/2025 11.0 (H) 4.0 - 5.6 % Final     Comment:     ADA Screening Guidelines:  5.7-6.4%  Consistent with prediabetes  >or=6.5%  Consistent with diabetes    High levels of fetal hemoglobin interfere with the HbA1C  assay. Heterozygous hemoglobin variants (HbS, HgC, etc)do  not significantly interfere with this assay.   However, presence of multiple variants may affect accuracy.     11/01/2023 9.0 (H) 4.0 - 5.6 % Final     Comment:     ADA Screening Guidelines:  5.7-6.4%  Consistent with prediabetes  >or=6.5%  Consistent with diabetes    High levels of fetal hemoglobin interfere with the HbA1C  assay. Heterozygous hemoglobin variants (HbS, HgC, etc)do  not significantly interfere with this assay.   However, presence of multiple variants may affect accuracy.     07/28/2023 9.5 (H) 4.0 - 5.6 % Final     Comment:     ADA Screening Guidelines:  5.7-6.4%  Consistent with prediabetes  >or=6.5%  Consistent with diabetes    High levels of fetal hemoglobin interfere with the HbA1C  assay. Heterozygous hemoglobin variants (HbS, HgC, etc)do  not significantly interfere with this assay. "   However, presence of multiple variants may affect accuracy.          Chemistry        Component Value Date/Time     02/06/2025 1020    K 4.0 02/06/2025 1020     02/06/2025 1020    CO2 25 02/06/2025 1020    BUN 17 02/06/2025 1020    CREATININE 1.2 02/06/2025 1020     (H) 02/06/2025 1020        Component Value Date/Time    CALCIUM 9.8 02/06/2025 1020    ALKPHOS 184 (H) 02/06/2025 1020    AST 23 02/06/2025 1020    ALT 31 02/06/2025 1020    BILITOT 0.3 02/06/2025 1020    ESTGFRAFRICA >60.0 07/22/2022 0900    EGFRNONAA >60.0 07/22/2022 0900           Lab Results   Component Value Date    TSH 0.820 02/20/2025      Lab Results   Component Value Date    CHOL 128 02/06/2025    CHOL 113 (L) 07/28/2023    CHOL 112 (L) 07/22/2022     Lab Results   Component Value Date    HDL 44 02/06/2025    HDL 40 07/28/2023    HDL 47 07/22/2022     Lab Results   Component Value Date    LDLCALC 71.0 02/06/2025    LDLCALC 61.2 (L) 07/28/2023    LDLCALC 57.2 (L) 07/22/2022     Lab Results   Component Value Date    TRIG 65 02/06/2025    TRIG 59 07/28/2023    TRIG 39 07/22/2022     Lab Results   Component Value Date    CHOLHDL 34.4 02/06/2025    CHOLHDL 35.4 07/28/2023    CHOLHDL 42.0 07/22/2022         PHYSICAL EXAMINATION  Constitutional: Appears well, no distress.  Eyes: Sclera white, PERRLA, EOMs intact.  Neck: Supple, trachea midline.   Respiratory: No cough, SOB, nor CARRANZA.  Cardiovascular: RRR; no edema.  Skin: Warm and dry; no rash, lesions, at injection sites  Neuro: AAOx4, steady gait  Psychiatric: No unusual, disruptive, or inappropriate behavior.       Assessment/Plan    1. Type 2 diabetes mellitus with hyperglycemia, without long-term current use of insulin  Overview:  No retinopathy    Orders:  -     insulin degludec (TRESIBA FLEXTOUCH U-100) 100 unit/mL (3 mL) insulin pen; Inject 12 Units into the skin every evening.  Dispense: 9 mL; Refill: 3  -     semaglutide (OZEMPIC) 0.25 mg or 0.5 mg (2 mg/3 mL) pen  injector; Inject 0.25 mg into the skin every 7 days.  Dispense: 3 mL; Refill: 3    -Reviewed last A1c.  11.0%  -Discussed A1c goal <8%.  -Discussed BG goals, provided copy.  -Continue Dexcom G7 CGM monitoring.   -Advised to refer to brochures on meal planning.  -Start Ozempic 0.25mg weekly. Discussed MOA, SE, and dosage, pt vu.    -Increase Tresiba 12 units at bedtime.  -Start Synjardy XR 12.5-1000mg daily after current supply ends.  -Discussed goal to wean insulin to a minimal dose w/o experiencing hypo or hyperglycemia.   -Advised to attend diabetes eye exam appt scheduled for 3/19/2025.         2. Uses self-applied continuous glucose monitoring device    -Dexcom G7 readings reviewed with patient.  -Interpretation:   -Diabetes poorly controlled; BG below  target range goal; FBG  not at goal;  PPG excursions present.   -Medication regimen adjusted according to current findings.   -See glucose readings below.  -Re-eval upon next visit.          FOLLOW UP  Follow up in about 5 weeks (around 4/15/2025).

## 2025-03-11 NOTE — TELEPHONE ENCOUNTER
No care due was identified.  [facility] Embedded Care Due Messages. Reference number: [id number].   3/11/2025 3:26:34 PM CDT

## 2025-03-11 NOTE — PATIENT INSTRUCTIONS
Continue monitoring your blood sugars using your Dexcom G7.    Blood glucose goals:   Fasting blood glucose goal:  mg/dL.  Premeal blood glucose goal:  mg/dL.  Postmeal blood glucose goal ( 2 hrs): <180 mg/dL.  Bedtime / overnight blood glucose goal:  mg/dL.     Stop taking Alogliptin      Increase Tresiba to 12 units at bedtime.      Start Synjardy-XR after current supply runs out.      -Start  Ozempic at 0.25mg weekly.   *Be sure to take your injection on the same day every week. You can take your injection with or without food.   *Please notify the office if you start to experience severe nausea, vomiting, stomach or back pain after taking this medication.        If your blood sugar is low, follow the 15-15 rule: Have 15 grams of carbs, then wait 15 minutes. If the blood glucose level is less than 70, treat with 15 g of fast-acting carbohydrate, such as 4 oz of fruit juice or three or four glucose tablets; and  recheck the blood glucose level after 15 minutes to ensure that it has normalized.  Check your blood sugar again. If it's still less than 70 mg/dL, repeat this process.        Refer to the food brochures when planning meals.      Keep your diabetes eye exam appointment on 3/19/2025.      F/u on 4/15/2025 @ 10:30am.

## 2025-03-12 RX ORDER — CETIRIZINE HYDROCHLORIDE 10 MG/1
10 TABLET ORAL
Qty: 90 TABLET | Refills: 3 | OUTPATIENT
Start: 2025-03-12

## 2025-03-12 NOTE — TELEPHONE ENCOUNTER
Refill Routing Note   Medication(s) are not appropriate for processing by [facility] for the following reason(s):        No active prescription written by provider  New or recently adjusted medication    [facility] action(s):  Defer               Appointments  past 12m or future 3m with PCP    Date Provider   Last Visit   2/20/2025 Danita Natarajan MD   Next Visit   6/23/2025 Danita Natarajan MD   ED visits in past 90 days: 0        Note composed:5:35 AM 03/12/2025

## 2025-03-19 ENCOUNTER — OFFICE VISIT (OUTPATIENT)
Dept: DERMATOLOGY | Facility: CLINIC | Age: 65
End: 2025-03-19
Payer: MEDICARE

## 2025-03-19 DIAGNOSIS — L30.0 NUMMULAR ECZEMA: ICD-10-CM

## 2025-03-19 PROCEDURE — 99202 OFFICE O/P NEW SF 15 MIN: CPT | Mod: S$PBB,,, | Performed by: DERMATOLOGY

## 2025-03-19 PROCEDURE — 99213 OFFICE O/P EST LOW 20 MIN: CPT | Mod: PBBFAC | Performed by: DERMATOLOGY

## 2025-03-19 PROCEDURE — 99999 PR PBB SHADOW E&M-EST. PATIENT-LVL III: CPT | Mod: PBBFAC,,, | Performed by: DERMATOLOGY

## 2025-03-19 RX ORDER — TRIAMCINOLONE ACETONIDE 1 MG/G
CREAM TOPICAL
Qty: 60 G | Refills: 1 | Status: SHIPPED | OUTPATIENT
Start: 2025-03-19

## 2025-03-19 RX ORDER — CETIRIZINE HYDROCHLORIDE 10 MG/1
TABLET ORAL
Qty: 30 TABLET | Refills: 2 | Status: SHIPPED | OUTPATIENT
Start: 2025-03-19

## 2025-03-19 NOTE — PROGRESS NOTES
The patient is accompanied to this visit by his wife.      Review of patient's allergies indicates:  No Known Allergies      Current Medications[1]    Subjective:      Patient ID:  Parth Lund is a 64 y.o. male who presents for   Chief Complaint   Patient presents with    Rash     Patient here for Rash.     Rash - Initial  Affected locations: right arm, left arm, abdomen, left buttock, right buttock, right upper leg and left upper leg  Duration: 6 months  Signs / symptoms: dryness and itching  Severity: mild  Timing: recurrent  Aggravated by: nothing  Relieving factors/Treatments tried: OTC hydrocortisone (cortisone 10 nightly, completed  zyretc 10mg)  Improvement on treatment: moderate    Review of Systems   Constitutional:  Negative for fever.   HENT:  Negative for congestion, rhinorrhea and postnasal drip.    Eyes:  Negative for itching.   Skin:  Positive for itching, rash and dry skin.   Allergic/Immunologic: Positive for environmental allergies.       Objective:   Physical Exam   Constitutional: He appears well-developed and well-nourished. No distress.   Neurological: He is alert and oriented to person, place, and time. He is not disoriented.   Psychiatric: He has a normal mood and affect.   Skin:   Areas Examined (abnormalities noted in diagram):   Abdomen Inspection Performed  Genitals / Buttocks / Groin Inspection Performed  Back Inspection Performed  RUE Inspected  LUE Inspection Performed  RLE Inspected  LLE Inspection Performed            Diagram Legend     Erythematous scaling macule/papule c/w actinic keratosis       Vascular papule c/w angioma      Pigmented verrucoid papule/plaque c/w seborrheic keratosis      Yellow umbilicated papule c/w sebaceous hyperplasia      Irregularly shaped tan macule c/w lentigo     1-2 mm smooth white papules consistent with Milia      Movable subcutaneous cyst with punctum c/w epidermal inclusion cyst      Subcutaneous movable cyst c/w pilar cyst      Firm pink to  brown papule c/w dermatofibroma      Pedunculated fleshy papule(s) c/w skin tag(s)      Evenly pigmented macule c/w junctional nevus     Mildly variegated pigmented, slightly irregular-bordered macule c/w mildly atypical nevus      Flesh colored to evenly pigmented papule c/w intradermal nevus       Pink pearly papule/plaque c/w basal cell carcinoma      Erythematous hyperkeratotic cursted plaque c/w SCC      Surgical scar with no sign of skin cancer recurrence      Open and closed comedones      Inflammatory papules and pustules      Verrucoid papule consistent consistent with wart     Erythematous eczematous patches and plaques     Dystrophic onycholytic nail with subungual debris c/w onychomycosis     Umbilicated papule    Erythematous-base heme-crusted tan verrucoid plaque consistent with inflamed seborrheic keratosis     Erythematous Silvery Scaling Plaque c/w Psoriasis     See annotation            Assessment / Plan:        Nummular eczema  -     Ambulatory referral/consult to Dermatology  -     cetirizine (ZYRTEC) 10 MG tablet; Take one by mouth QD prn itching  Dispense: 30 tablet; Refill: 2    Other orders  -     triamcinolone acetonide 0.1% (KENALOG) 0.1 % cream; Apply sparingly bid to affected areas prn  Dispense: 60 g; Refill: 1    The diagnosis, options, risks, benefits and alternatives were discussed with the patient and his wife.    Sufficient time was available for questions, and all questions were answered to the his satisfaction.    Trial Cetaphil cleanser as alternative to soap    Avoid excess use of cleanser/soap, use only to body fold areas, decrease frequency of bathing if possible    Rx TAC as above to affected sites prn    Continue zyrtec as above prn for itching    Avoid use of topical alcohol    Followup prn       [1]   Current Outpatient Medications:     amLODIPine (NORVASC) 10 MG tablet, Take 1 tablet (10 mg total) by mouth once daily., Disp: 90 tablet, Rfl: 1    atorvastatin (LIPITOR) 20  "MG tablet, Take 1 tablet (20 mg total) by mouth once daily., Disp: 90 tablet, Rfl: 1    blood sugar diagnostic Strp, 1 each by Misc.(Non-Drug; Combo Route) route once daily., Disp: 100 each, Rfl: 3    cetirizine (ZYRTEC) 10 MG tablet, Take one by mouth QD prn itching, Disp: 30 tablet, Rfl: 2    empagliflozin-metformin (SYNJARDY XR) 12.5-1,000 mg TBph, Take 1 tablet by mouth once daily., Disp: 90 tablet, Rfl: 2    flash glucose sensor Kit, 1 Device by Misc.(Non-Drug; Combo Route) route every 14 (fourteen) days. (Patient not taking: Reported on 2/11/2025), Disp: 6 kit, Rfl: 3    insulin degludec (TRESIBA FLEXTOUCH U-100) 100 unit/mL (3 mL) insulin pen, Inject 12 Units into the skin every evening., Disp: 9 mL, Rfl: 3    lancets Misc, 1 each by Misc.(Non-Drug; Combo Route) route once daily., Disp: 100 each, Rfl: 3    losartan (COZAAR) 50 MG tablet, Take 1 tablet (50 mg total) by mouth once daily., Disp: 90 tablet, Rfl: 1    ONETOUCH DELICA PLUS LANCET 33 gauge Misc, Apply topically., Disp: , Rfl:     ONETOUCH VERIO FLEX METER Misc, USE AS INSTRUCTED, Disp: 1 each, Rfl: 0    pen needle, diabetic (BD KAREEN 2ND GEN PEN NEEDLE) 32 gauge x 5/32" Ndle, 1 Needle by Misc.(Non-Drug; Combo Route) route every evening., Disp: 100 each, Rfl: 5    RSVPreF3 antigen-AS01E, PF, (AREXVY, PF,) 120 mcg/0.5 mL SusR vaccine, Inject into the muscle., Disp: 1 each, Rfl: 0    semaglutide (OZEMPIC) 0.25 mg or 0.5 mg (2 mg/3 mL) pen injector, Inject 0.25 mg into the skin every 7 days., Disp: 3 mL, Rfl: 3    sildenafil (VIAGRA) 100 MG tablet, Take 1 tablet (100 mg total) by mouth daily as needed for Erectile Dysfunction., Disp: 6 tablet, Rfl: 5    triamcinolone acetonide 0.1% (KENALOG) 0.1 % cream, Apply sparingly bid to affected areas prn, Disp: 60 g, Rfl: 1    "

## 2025-03-31 ENCOUNTER — OFFICE VISIT (OUTPATIENT)
Dept: OPTOMETRY | Facility: CLINIC | Age: 65
End: 2025-03-31
Payer: MEDICARE

## 2025-03-31 DIAGNOSIS — E11.65 TYPE 2 DIABETES MELLITUS WITH HYPERGLYCEMIA, WITHOUT LONG-TERM CURRENT USE OF INSULIN: Primary | ICD-10-CM

## 2025-03-31 DIAGNOSIS — Z13.5 GLAUCOMA SCREENING: ICD-10-CM

## 2025-03-31 DIAGNOSIS — H52.203 HYPEROPIA OF BOTH EYES WITH ASTIGMATISM AND PRESBYOPIA: ICD-10-CM

## 2025-03-31 DIAGNOSIS — H52.4 HYPEROPIA OF BOTH EYES WITH ASTIGMATISM AND PRESBYOPIA: ICD-10-CM

## 2025-03-31 DIAGNOSIS — H52.03 HYPEROPIA OF BOTH EYES WITH ASTIGMATISM AND PRESBYOPIA: ICD-10-CM

## 2025-03-31 DIAGNOSIS — H25.13 NUCLEAR SCLEROSIS, BILATERAL: ICD-10-CM

## 2025-03-31 PROCEDURE — 99999 PR PBB SHADOW E&M-EST. PATIENT-LVL III: CPT | Mod: PBBFAC,,, | Performed by: OPTOMETRIST

## 2025-03-31 PROCEDURE — 92015 DETERMINE REFRACTIVE STATE: CPT | Mod: ,,, | Performed by: OPTOMETRIST

## 2025-03-31 PROCEDURE — 99213 OFFICE O/P EST LOW 20 MIN: CPT | Mod: PBBFAC | Performed by: OPTOMETRIST

## 2025-03-31 PROCEDURE — 92014 COMPRE OPH EXAM EST PT 1/>: CPT | Mod: S$PBB,,, | Performed by: OPTOMETRIST

## 2025-03-31 NOTE — PROGRESS NOTES
HPI    DLE - 12/06/22 Dr. Capellan  [facility]   Pt. States no major changes since last visit. [facility] is overall stable.   Denies F/F/pain.  Gtts: none.    DM2 is controled via meds     Hemoglobin A1C       Date                     Value               Ref Range             Status                02/06/2025               11.0 (H)            4.0 - 5.6 %           Final                Last edited by Samy Prince, OD on 3/31/2025  9:37 AM.            Assessment /Plan     For exam results, see Encounter Report.    Type 2 diabetes mellitus with hyperglycemia, without long-term current use of insulin  -     Ambulatory referral/consult to Optometry  -No retinopathy noted today.  Continued control with primary care physician and annual comprehensive eye exam.    Nuclear sclerosis, bilateral  -Educated patient on presence of cataracts at today's exam, monitor at annual dilated fundus exam. 5+ years surgical estimate.    Glaucoma screening  -Monitor with annual eye exam and IOP check    Hyperopia of both eyes with astigmatism and presbyopia  Eyeglass Final Rx       Eyeglass Final Rx         Sphere Cylinder Axis Dist [facility] Add    Right +1.25 +1.00 165 20/20 +2.25    Left +0.75 +0.75 005 20/20 +2.25      Type: Bifocal    Expiration Date: 3/31/2026                      RTC 1 yr                     none

## 2025-04-15 ENCOUNTER — OFFICE VISIT (OUTPATIENT)
Dept: INTERNAL MEDICINE | Facility: CLINIC | Age: 65
End: 2025-04-15
Payer: MEDICARE

## 2025-04-15 ENCOUNTER — TELEPHONE (OUTPATIENT)
Dept: PHARMACY | Facility: CLINIC | Age: 65
End: 2025-04-15
Payer: MEDICARE

## 2025-04-15 VITALS
DIASTOLIC BLOOD PRESSURE: 66 MMHG | HEIGHT: 68 IN | BODY MASS INDEX: 23.19 KG/M2 | WEIGHT: 153 LBS | SYSTOLIC BLOOD PRESSURE: 120 MMHG | HEART RATE: 76 BPM | OXYGEN SATURATION: 98 %

## 2025-04-15 DIAGNOSIS — E11.65 TYPE 2 DIABETES MELLITUS WITH HYPERGLYCEMIA, WITHOUT LONG-TERM CURRENT USE OF INSULIN: Primary | ICD-10-CM

## 2025-04-15 DIAGNOSIS — Z97.8 USES SELF-APPLIED CONTINUOUS GLUCOSE MONITORING DEVICE: ICD-10-CM

## 2025-04-15 DIAGNOSIS — E78.2 MIXED HYPERLIPIDEMIA: ICD-10-CM

## 2025-04-15 PROCEDURE — 99215 OFFICE O/P EST HI 40 MIN: CPT | Mod: PBBFAC,PO

## 2025-04-15 PROCEDURE — 99999 PR PBB SHADOW E&M-EST. PATIENT-LVL V: CPT | Mod: PBBFAC,,,

## 2025-04-15 NOTE — LETTER
April 15, 2025    Parth Lund  [address] [facility] [address]               Dear Mr. Lund,      My name is Bhavesh Basilio  I am reaching out on behalf of [facility]s Pharmacy Patient Assistance Team in regards to your request for medication assistance. Our goal is to assist qualified [facility] patients with obtaining financial assistance for prescribed medications.     Please note that enrollment into available support may require the following documents:      Proof of household Income(such as social security award letter, pension statement or 3 consecutive pay stubs), Copy of all insurance cards(front and back), and Completed Medication Access Center Authorization Forms.       Please reach out to my phone number below if you are still in need of assistance with your medications. Follow-up call will be made in 5 business days. We look forward to hearing from you soon!    Thank you for choosing [facility] for your healthcare needs      Sincerely  Bhavesh GARRETT @[phone]  Pharmacy Patient Assistance Team  [address] [place] [address]  [id number] [place] [address]  Fax: [phone]  Email: [email or web address]

## 2025-04-15 NOTE — TELEPHONE ENCOUNTER
Welcome letter sent via letter to inform patient of PAP application process for Ozempic 0.25 mg, Ozempic 0.5mg, and Synjardy. Please ensure the chart reflects a current order for the requested medication. Follow-up phone call will be made to patient in 5 business days.    Your help is appreciated  Bhavesh Basilio  Pharmacy Patient Assistance Team

## 2025-04-15 NOTE — TELEPHONE ENCOUNTER
----- Message from Ana Choudhary sent at 4/15/2025 12:42 PM CDT -----  Regarding: RE: Order for BAILEY MARTI  Medication(s) needing assistance with? Ozempic and Synjardy______________________________________________________________________________Hello, Mr. Marti's case has been assigned to Bhavesh Basilio. Please ensure the chart reflects a current order for the requested medication written by an [facility] provider. The following documentation may be required from the patient to begin the application process:Proof of household Income, Copy of all insurance cards, and Completed Medication Access Center Authorization Forms. Provider may review further progress in the patient's chart through Telephone Encounter notes from Pharmacy Patient Assistance. Thank you, Pharmacy Patient Assistance Team  ----- Message -----  From: Bradford Vincent FNP-C  Sent: 4/15/2025  10:55 AM CDT  To: Pharmacy Patient Assistance Team  Subject: Order for BAILEY MARTI

## 2025-04-15 NOTE — PROGRESS NOTES
CHIEF COMPLAINT: Type 2 Diabetes    Referred by PCP: Dr. Natarajan  Previously managed by: PCP  CDE person of contact (if needed): Monica Self       HPI: Mr. Parth Lund is a 65 y.o. male who was diagnosed with Type 2 DM in 8 yrs.     Last A1c: 11.0% (2/2025)    Hx of hospitalization for DM? No   Hx of HTN? Yes   Hx of CKD? No   Hx of CHF? No   Hx of CVA? No   Hx of MI? No   Hx of Pancreatitis? No   Hx of DM neuropathy? No   Hx DR? No   Hx of Gastroparesis? No       VISIT SUMMARY:  Wife Jose Juan present during visit.  Has Dexcom G7 with   Tolerating Ozempic well  Dexcom report showed diabetes well controlled with current medication regimen.   BG av decreased from 255 to 130  Reports lows on CGM report are false lows due to sensor malfunction.  Reports Synjardy-XR and Ozempic is costly  Referral sent to PAP   Reports eye exam completed 3/19/2025 was NML  Wt today 153#      BG monitoring:   Name: Dexcom G7  BG av: 130      Lifestyle:   Diet: Has made diet changes  Exercise: stays active around the home      Previous Diabetes Meds Tried:  Glipizide-not effective  Alogliptin- alt therapy      Current Diabetic Meds:   Synjardy XR 12.5-1000mg daily  Ozempic 0.25mg weekly (Tuesday)      Diabetes Management Status:  Statin: Taking  ACE/ARB: Taking    Foot exam due: 2/6/2026  Eye exam due: 3/2026      Screening or Prevention Patient's value Goal Complete/Controlled?   HgA1C Testing and Control   Lab Results   Component Value Date    HGBA1C 11.0 (H) 02/06/2025      Annually/Less than 8% No   Lipid profile : 02/06/2025 Annually Yes   LDL control Lab Results   Component Value Date    LDLCALC 71.0 02/06/2025    Annually/Less than 100 mg/dl  Yes   Nephropathy screening Lab Results   Component Value Date    LABMICR 5.0 02/20/2025     Lab Results   Component Value Date    PROTEINUA Negative 08/03/2023    Annually No   Blood pressure BP Readings from Last 1 Encounters:   04/15/25 120/66    Less than 140/90 Yes   Dilated  "retinal exam : 03/31/2025 Annually No   Foot exam   : 02/06/2025 Annually Yes     REVIEW OF SYSTEMS  General: Denies weakness, fatigue, or weight changes.   Eyes: Denies double or blurred vision, eye pain, or redness.  Cardiovascular: Denies CP, palpitations, or edema.   Respiratory: Denies cough or dyspnea.   GI: Denies heartburn, n/v, or changes in bowel patterns.   Skin: Denies rash, lesions, itching at injection sites.  Neuro: Denies severe HAs, numbness, tingling, tremors, or vertigo.   Endocrine: Denies polyuria, polydipsia, polyphagia, heat or cold intolerance.     Vital Signs  /66 (BP Location: Right arm, Patient Position: Sitting)   Pulse 76   Ht 5' 8" (1.727 m)   Wt 69.4 kg (153 lb)   SpO2 98%   BMI 23.26 kg/m²     Hemoglobin A1C   Date Value Ref Range Status   02/06/2025 11.0 (H) 4.0 - 5.6 % Final     Comment:     ADA Screening Guidelines:  5.7-6.4%  Consistent with prediabetes  >or=6.5%  Consistent with diabetes    High levels of fetal hemoglobin interfere with the HbA1C  assay. Heterozygous hemoglobin variants (HbS, HgC, etc)do  not significantly interfere with this assay.   However, presence of multiple variants may affect accuracy.     11/01/2023 9.0 (H) 4.0 - 5.6 % Final     Comment:     ADA Screening Guidelines:  5.7-6.4%  Consistent with prediabetes  >or=6.5%  Consistent with diabetes    High levels of fetal hemoglobin interfere with the HbA1C  assay. Heterozygous hemoglobin variants (HbS, HgC, etc)do  not significantly interfere with this assay.   However, presence of multiple variants may affect accuracy.     07/28/2023 9.5 (H) 4.0 - 5.6 % Final     Comment:     ADA Screening Guidelines:  5.7-6.4%  Consistent with prediabetes  >or=6.5%  Consistent with diabetes    High levels of fetal hemoglobin interfere with the HbA1C  assay. Heterozygous hemoglobin variants (HbS, HgC, etc)do  not significantly interfere with this assay.   However, presence of multiple variants may affect accuracy.   "        Chemistry        Component Value Date/Time     02/06/2025 1020    K 4.0 02/06/2025 1020     02/06/2025 1020    CO2 25 02/06/2025 1020    BUN 17 02/06/2025 1020    CREATININE 1.2 02/06/2025 1020     (H) 02/06/2025 1020        Component Value Date/Time    CALCIUM 9.8 02/06/2025 1020    ALKPHOS 184 (H) 02/06/2025 1020    AST 23 02/06/2025 1020    ALT 31 02/06/2025 1020    BILITOT 0.3 02/06/2025 1020    ESTGFRAFRICA >60.0 07/22/2022 0900    EGFRNONAA >60.0 07/22/2022 0900           Lab Results   Component Value Date    TSH 0.820 02/20/2025      Lab Results   Component Value Date    CHOL 128 02/06/2025    CHOL 113 (L) 07/28/2023    CHOL 112 (L) 07/22/2022     Lab Results   Component Value Date    HDL 44 02/06/2025    HDL 40 07/28/2023    HDL 47 07/22/2022     Lab Results   Component Value Date    LDLCALC 71.0 02/06/2025    LDLCALC 61.2 (L) 07/28/2023    LDLCALC 57.2 (L) 07/22/2022     Lab Results   Component Value Date    TRIG 65 02/06/2025    TRIG 59 07/28/2023    TRIG 39 07/22/2022     Lab Results   Component Value Date    CHOLHDL 34.4 02/06/2025    CHOLHDL 35.4 07/28/2023    CHOLHDL 42.0 07/22/2022         PHYSICAL EXAMINATION  Constitutional: Appears well, no distress.  Eyes: Sclera white, PERRLA, EOMs intact.  Neck: Supple, trachea midline.   Respiratory: No cough, SOB, nor CARRANZA.  Cardiovascular: RRR; no edema.  Skin: Warm and dry; no rash, lesions, at injection sites  Neuro: AAOx4, steady gait  Psychiatric: No unusual, disruptive, or inappropriate behavior.       Assessment/Plan    1. Type 2 diabetes mellitus with hyperglycemia, without long-term current use of insulin  Overview:  No retinopathy    Orders:  -     insulin degludec (TRESIBA FLEXTOUCH U-100) 100 unit/mL (3 mL) insulin pen; Inject 12 Units into the skin every evening.  Dispense: 9 mL; Refill: 3  -     semaglutide (OZEMPIC) 0.25 mg or 0.5 mg (2 mg/3 mL) pen injector; Inject 0.25 mg into the skin every 7 days.  Dispense: 3 mL;  Refill: 3                    1. Type 2 diabetes mellitus with hyperglycemia, without long-term current use of insulin  Overview:  No retinopathy    Orders:  -     Ambulatory referral/consult to Pharmacy Assistance; Future; Expected date: 04/15/2025  -     Microalbumin/Creatinine Ratio, Urine; Future; Expected date: 05/15/2025  -     Comprehensive Metabolic Panel; Future; Expected date: 05/15/2025  -     Hemoglobin A1C; Future; Expected date: 05/15/2025  -Reviewed last A1c.  11.0%  -Discussed A1c goal <8%.  -Discussed BG goals, provided copy.  -Continue Dexcom G7 CGM monitoring.   -Cont Ozempic 0.25mg weekly.   -Decrease Tresiba 10units at bedtime.  -Cont Synjardy XR 12.5-1000mg daily   -Discussed goal to wean insulin to a minimal dose w/o experiencing hypo or hyperglycemia.   -Referral to PAP for Ozempic and Synjardy  -Update labs prior to f/u visit.     2. Mixed hyperlipidemia  -     Lipid Panel; Future; Expected date: 05/15/2025  -Reviewed Lipid panel 128/65/44/71  -Discussed LDL goal <70.  -Discussed lifestyle changes.  -Continue atorvastatin 20 mg HS.  -Discussed importance of lipid control in setting of diabetes  -Discussed the importance of taking statin every night and lifestyle changes to slow the risk of a cardiovascular event such as CVA or MI.    -Update labs prior to f/u visit.      3. Uses self-applied continuous glucose monitoring device  -Dexcom G7 readings reviewed with patient.  -Interpretation:   -Diabetes well controlled; BG above target range goal; FBG at goal;  PPG excursions absent.   -Medication regimen adjusted according to current findings.   -See glucose readings below.  -Re-eval upon next visit.        FOLLOW UP  Follow up in about 5 weeks (around 5/20/2025).

## 2025-04-15 NOTE — PATIENT INSTRUCTIONS
Continue monitoring your blood sugars using your Dexcom G7.    Blood glucose goals:   Fasting blood glucose goal:  mg/dL.  Premeal blood glucose goal:  mg/dL.  Postmeal blood glucose goal ( 2 hrs): <180 mg/dL.  Bedtime / overnight blood glucose goal:  mg/dL      Continue taking Synjardy XR 12.5-1000mg daily.     Continue taking Ozempic 0.25mg weekly.       Decrease Tresiba to 10 units at bedtime.      If your blood sugar is low, follow the 15-15 rule: Have 15 grams of carbs, then wait 15 minutes. If the blood glucose level is less than 70, treat with 15 g of fast-acting carbohydrate, such as 4 oz of fruit juice or three or four glucose tablets; and  recheck the blood glucose level after 15 minutes to ensure that it has normalized.  Check your blood sugar again. If it's still less than 70 mg/dL, repeat this process.        Continue same diet and exercise regimen.      Get labs completed week of May 12th. Be sure not to eat past midnight. You may take medications with sips of water in the morning before getting labs drawn.         Follow up on 5/20/2025 @ 10:30 am.

## 2025-05-09 ENCOUNTER — RESULTS FOLLOW-UP (OUTPATIENT)
Dept: INTERNAL MEDICINE | Facility: CLINIC | Age: 65
End: 2025-05-09

## 2025-05-09 ENCOUNTER — LAB VISIT (OUTPATIENT)
Dept: LAB | Facility: HOSPITAL | Age: 65
End: 2025-05-09
Payer: MEDICARE

## 2025-05-09 DIAGNOSIS — E78.2 MIXED HYPERLIPIDEMIA: ICD-10-CM

## 2025-05-09 DIAGNOSIS — E11.65 TYPE 2 DIABETES MELLITUS WITH HYPERGLYCEMIA, WITHOUT LONG-TERM CURRENT USE OF INSULIN: ICD-10-CM

## 2025-05-09 LAB
ALBUMIN SERPL BCP-MCNC: 3.4 G/DL (ref 3.5–5.2)
ALP SERPL-CCNC: 145 UNIT/L (ref 40–150)
ALT SERPL W/O P-5'-P-CCNC: 48 UNIT/L (ref 10–44)
ANION GAP (OHS): 8 MMOL/L (ref 8–16)
AST SERPL-CCNC: 26 UNIT/L (ref 11–45)
BILIRUB SERPL-MCNC: 0.3 MG/DL (ref 0.1–1)
BUN SERPL-MCNC: 13 MG/DL (ref 8–23)
CALCIUM SERPL-MCNC: 9.2 MG/DL (ref 8.7–10.5)
CHLORIDE SERPL-SCNC: 105 MMOL/L (ref 95–110)
CHOLEST SERPL-MCNC: 118 MG/DL (ref 120–199)
CHOLEST/HDLC SERPL: 2.9 {RATIO} (ref 2–5)
CO2 SERPL-SCNC: 28 MMOL/L (ref 23–29)
CREAT SERPL-MCNC: 1 MG/DL (ref 0.5–1.4)
EAG (OHS): 169 MG/DL (ref 68–131)
GFR SERPLBLD CREATININE-BSD FMLA CKD-EPI: >60 ML/MIN/1.73/M2
GLUCOSE SERPL-MCNC: 114 MG/DL (ref 70–110)
HBA1C MFR BLD: 7.5 % (ref 4–5.6)
HDLC SERPL-MCNC: 41 MG/DL (ref 40–75)
HDLC SERPL: 34.7 % (ref 20–50)
LDLC SERPL CALC-MCNC: 64.8 MG/DL (ref 63–159)
NONHDLC SERPL-MCNC: 77 MG/DL
POTASSIUM SERPL-SCNC: 3.9 MMOL/L (ref 3.5–5.1)
PROT SERPL-MCNC: 6.7 GM/DL (ref 6–8.4)
SODIUM SERPL-SCNC: 141 MMOL/L (ref 136–145)
TRIGL SERPL-MCNC: 61 MG/DL (ref 30–150)

## 2025-05-09 PROCEDURE — 83036 HEMOGLOBIN GLYCOSYLATED A1C: CPT

## 2025-05-09 PROCEDURE — 80053 COMPREHEN METABOLIC PANEL: CPT

## 2025-05-09 PROCEDURE — 80061 LIPID PANEL: CPT

## 2025-05-09 PROCEDURE — 36415 COLL VENOUS BLD VENIPUNCTURE: CPT | Mod: PN

## 2025-05-09 NOTE — PROGRESS NOTES
Doing real good with managing his diabetes. A1c down from 11.0% to 7.5%.  Thanks once again for referral.

## 2025-05-20 ENCOUNTER — OFFICE VISIT (OUTPATIENT)
Dept: INTERNAL MEDICINE | Facility: CLINIC | Age: 65
End: 2025-05-20
Payer: MEDICARE

## 2025-05-20 VITALS
HEIGHT: 68 IN | DIASTOLIC BLOOD PRESSURE: 64 MMHG | HEART RATE: 84 BPM | OXYGEN SATURATION: 98 % | WEIGHT: 153.88 LBS | BODY MASS INDEX: 23.32 KG/M2 | SYSTOLIC BLOOD PRESSURE: 120 MMHG

## 2025-05-20 DIAGNOSIS — Z97.8 USES SELF-APPLIED CONTINUOUS GLUCOSE MONITORING DEVICE: ICD-10-CM

## 2025-05-20 DIAGNOSIS — E11.65 TYPE 2 DIABETES MELLITUS WITH HYPERGLYCEMIA, WITHOUT LONG-TERM CURRENT USE OF INSULIN: Primary | ICD-10-CM

## 2025-05-20 DIAGNOSIS — E78.2 MIXED HYPERLIPIDEMIA: ICD-10-CM

## 2025-05-20 PROCEDURE — 99214 OFFICE O/P EST MOD 30 MIN: CPT | Mod: S$PBB,,,

## 2025-05-20 PROCEDURE — 99999 PR PBB SHADOW E&M-EST. PATIENT-LVL IV: CPT | Mod: PBBFAC,,,

## 2025-05-20 PROCEDURE — 99214 OFFICE O/P EST MOD 30 MIN: CPT | Mod: PBBFAC,PO

## 2025-05-20 PROCEDURE — 95251 CONT GLUC MNTR ANALYSIS I&R: CPT | Mod: ,,,

## 2025-05-20 RX ORDER — INSULIN DEGLUDEC 100 U/ML
10 INJECTION, SOLUTION SUBCUTANEOUS NIGHTLY
Start: 2025-05-20 | End: 2026-05-20

## 2025-05-20 RX ORDER — PEN NEEDLE, DIABETIC 30 GX3/16"
1 NEEDLE, DISPOSABLE MISCELLANEOUS NIGHTLY
Qty: 100 EACH | Refills: 5 | Status: SHIPPED | OUTPATIENT
Start: 2025-05-20

## 2025-05-20 NOTE — ASSESSMENT & PLAN NOTE
-Dexcom G7 readings reviewed with patient.  -Interpretation:   -Diabetes well controlled; BG above target range goal; FBG at goal;  PPG excursions absent.   -Medication regimen adjusted according to current findings.   -See glucose readings below.  -Re-eval upon next visit.

## 2025-05-20 NOTE — PATIENT INSTRUCTIONS
Continue monitoring your blood sugars using your Dexcom G7.      Blood glucose goals:   Fasting blood glucose goal:  mg/dL.  Premeal blood glucose goal:  mg/dL.  Postmeal blood glucose goal ( 2 hrs): <180 mg/dL.  Bedtime / overnight blood glucose goal:  mg/dL.     Continue taking Synjardy XR 12.5-1000mg daily.      Continue taking Ozempic 0.25mg weekly.      Continue taking Tresiba  10 units at bedtime.      If your blood sugar is low, follow the 15-15 rule: Have 15 grams of carbs, then wait 15 minutes. If the blood glucose level is less than 70, treat with 15 g of fast-acting carbohydrate, such as 4 oz of fruit juice or three or four glucose tablets; and  recheck the blood glucose level after 15 minutes to ensure that it has normalized.  Check your blood sugar again. If it's still less than 70 mg/dL, repeat this process.        Refer to the food brochures when planning meals.      Continue same diet and exercise regimen.      Get labs completed on 8/18/2025. Be sure not to eat past midnight. You may take medications with sips of water in the morning before getting labs drawn.       Follow up on  8/20/2025 @ 10 am.

## 2025-05-20 NOTE — ASSESSMENT & PLAN NOTE
-Reviewed Lipid panel 118/61/41/64  -Discussed LDL goal <70.  -Discussed lifestyle changes.  -Continue atorvastatin 20 mg HS.  -Discussed importance of lipid control in setting of diabetes  -Discussed the importance of taking statin every night and lifestyle changes to slow the risk of a cardiovascular event such as CVA or MI.    -Update labs prior to f/u visit.

## 2025-05-20 NOTE — ASSESSMENT & PLAN NOTE
-Reviewed last A1c.  7.5%  -Discussed A1c goal <7-8%.  -Discussed BG goals, provided copy.  -Continue Dexcom G7 CGM monitoring.   -Cont Ozempic 0.25mg weekly.   -Cont Tresiba 10units at bedtime.  -Cont Synjardy XR 12.5-1000mg daily   -Discussed goal to wean insulin to a minimal dose w/o experiencing hypo or hyperglycemia.   -Update labs prior to f/u visit.

## 2025-05-20 NOTE — PROGRESS NOTES
CHIEF COMPLAINT: Type 2 Diabetes    Referred by PCP: Dr. Natarajan  Previously managed by: PCP  CDE person of contact (if needed): Monica Self       HPI: Mr. Parth Lund is a 65 y.o. male who was diagnosed with Type 2 DM in 8 yrs.     Last A1c: 7.5%     Hx of hospitalization for DM? No   Hx of HTN? Yes   Hx of CKD? No   Hx of CHF? No   Hx of CVA? No   Hx of MI? No   Hx of Pancreatitis? No   Hx of DM neuropathy? No   Hx DR? No   Hx of Gastroparesis? No       VISIT SUMMARY:  Wife Jose Juan present during visit.  Has Dexcom G7 with   Labs reviewed: A1c down from 11% to 7.5%; GFR >60; lipid 118/61/41/64  Tolerating Ozempic well  Dexcom report showed diabetes well controlled with current medication regimen.   BG av 152      BG monitoring:   Name: Dexcom G7  BG av: 152      Lifestyle:   Diet: Has made diet changes  Exercise: stays active around the home      Previous Diabetes Meds Tried:  Glipizide-not effective  Alogliptin- alt therapy      Current Diabetic Meds:   Synjardy XR 12.5-1000mg daily  Ozempic 0.25mg weekly (Tuesday)  Tresiba U-100, 10u HS        Diabetes Management Status:  Statin: Taking  ACE/ARB: Taking    Foot exam due: 2/6/2026  Eye exam due: 3/2026      Screening or Prevention Patient's value Goal Complete/Controlled?   HgA1C Testing and Control   Lab Results   Component Value Date    HGBA1C 7.5 (H) 05/09/2025      Annually/Less than 8% No   Lipid profile : 05/09/2025 Annually Yes   LDL control Lab Results   Component Value Date    LDLCALC 64.8 05/09/2025    Annually/Less than 100 mg/dl  Yes   Nephropathy screening Lab Results   Component Value Date    LABMICR 5.0 02/20/2025     Lab Results   Component Value Date    PROTEINUA Negative 08/03/2023    Annually No   Blood pressure BP Readings from Last 1 Encounters:   05/20/25 120/64    Less than 140/90 Yes   Dilated retinal exam : 03/31/2025 Annually No   Foot exam   : 02/06/2025 Annually Yes     REVIEW OF SYSTEMS  General: Denies weakness, fatigue,  "or weight changes.   Eyes: Denies double or blurred vision, eye pain, or redness.  Cardiovascular: Denies CP, palpitations, or edema.   Respiratory: Denies cough or dyspnea.   GI: Denies heartburn, n/v, or changes in bowel patterns.   Skin: Denies rash, lesions, itching at injection sites.  Neuro: Denies severe HAs, numbness, tingling, tremors, or vertigo.   Endocrine: Denies polyuria, polydipsia, polyphagia, heat or cold intolerance.     Vital Signs  /64 (BP Location: Right arm, Patient Position: Sitting)   Pulse 84   Ht 5' 8" (1.727 m)   Wt 69.8 kg (153 lb 14.1 oz)   SpO2 98%   BMI 23.40 kg/m²     Hemoglobin A1C   Date Value Ref Range Status   02/06/2025 11.0 (H) 4.0 - 5.6 % Final     Comment:     ADA Screening Guidelines:  5.7-6.4%  Consistent with prediabetes  >or=6.5%  Consistent with diabetes    High levels of fetal hemoglobin interfere with the HbA1C  assay. Heterozygous hemoglobin variants (HbS, HgC, etc)do  not significantly interfere with this assay.   However, presence of multiple variants may affect accuracy.     11/01/2023 9.0 (H) 4.0 - 5.6 % Final     Comment:     ADA Screening Guidelines:  5.7-6.4%  Consistent with prediabetes  >or=6.5%  Consistent with diabetes    High levels of fetal hemoglobin interfere with the HbA1C  assay. Heterozygous hemoglobin variants (HbS, HgC, etc)do  not significantly interfere with this assay.   However, presence of multiple variants may affect accuracy.     07/28/2023 9.5 (H) 4.0 - 5.6 % Final     Comment:     ADA Screening Guidelines:  5.7-6.4%  Consistent with prediabetes  >or=6.5%  Consistent with diabetes    High levels of fetal hemoglobin interfere with the HbA1C  assay. Heterozygous hemoglobin variants (HbS, HgC, etc)do  not significantly interfere with this assay.   However, presence of multiple variants may affect accuracy.       Hemoglobin A1c   Date Value Ref Range Status   05/09/2025 7.5 (H) 4.0 - 5.6 % Final     Comment:     ADA Screening " Guidelines:  5.7-6.4%  Consistent with prediabetes  >=6.5%  Consistent with diabetes    High levels of fetal hemoglobin interfere with the HbA1C  assay. Heterozygous hemoglobin variants (HbS, HgC, etc)do  not significantly interfere with this assay.   However, presence of multiple variants may affect accuracy.        Chemistry        Component Value Date/Time     05/09/2025 0814     02/06/2025 1020    K 3.9 05/09/2025 0814    K 4.0 02/06/2025 1020     05/09/2025 0814     02/06/2025 1020    CO2 28 05/09/2025 0814    CO2 25 02/06/2025 1020    BUN 13 05/09/2025 0814    CREATININE 1.0 05/09/2025 0814     (H) 05/09/2025 0814     (H) 02/06/2025 1020        Component Value Date/Time    CALCIUM 9.2 05/09/2025 0814    CALCIUM 9.8 02/06/2025 1020    ALKPHOS 145 05/09/2025 0814    ALKPHOS 184 (H) 02/06/2025 1020    AST 26 05/09/2025 0814    AST 23 02/06/2025 1020    ALT 48 (H) 05/09/2025 0814    ALT 31 02/06/2025 1020    BILITOT 0.3 05/09/2025 0814    BILITOT 0.3 02/06/2025 1020    ESTGFRAFRICA >60.0 07/22/2022 0900    EGFRNONAA >60.0 07/22/2022 0900           Lab Results   Component Value Date    TSH 0.820 02/20/2025      Lab Results   Component Value Date    CHOL 118 (L) 05/09/2025    CHOL 128 02/06/2025    CHOL 113 (L) 07/28/2023     Lab Results   Component Value Date    HDL 41 05/09/2025    HDL 44 02/06/2025    HDL 40 07/28/2023     Lab Results   Component Value Date    LDLCALC 64.8 05/09/2025    LDLCALC 71.0 02/06/2025    LDLCALC 61.2 (L) 07/28/2023     Lab Results   Component Value Date    TRIG 61 05/09/2025    TRIG 65 02/06/2025    TRIG 59 07/28/2023     Lab Results   Component Value Date    CHOLHDL 34.7 05/09/2025    CHOLHDL 34.4 02/06/2025    CHOLHDL 35.4 07/28/2023         PHYSICAL EXAMINATION  Constitutional: Appears well, no distress.  Eyes: Sclera white, PERRLA, EOMs intact.  Neck: Supple, trachea midline.   Respiratory: No cough, SOB, nor CARRANZA.  Cardiovascular: RRR; no  "edema.  Skin: Warm and dry; no rash, lesions, at injection sites  Neuro: AAOx4, steady gait  Psychiatric: No unusual, disruptive, or inappropriate behavior.       Assessment/Plan    1. Type 2 diabetes mellitus with hyperglycemia, without long-term current use of insulin  Overview:  No retinopathy    Assessment & Plan:  -Reviewed last A1c.  7.5%  -Discussed A1c goal <7-8%.  -Discussed BG goals, provided copy.  -Continue Dexcom G7 CGM monitoring.   -Cont Ozempic 0.25mg weekly.   -Cont Tresiba 10units at bedtime.  -Cont Synjardy XR 12.5-1000mg daily   -Discussed goal to wean insulin to a minimal dose w/o experiencing hypo or hyperglycemia.   -Update labs prior to f/u visit.     Orders:  -     insulin degludec (TRESIBA FLEXTOUCH U-100) 100 unit/mL (3 mL) insulin pen; Inject 10 Units into the skin every evening.  -     pen needle, diabetic (BD KAREEN 2ND GEN PEN NEEDLE) 32 gauge x 5/32" Ndle; 1 Needle by Misc.(Non-Drug; Combo Route) route every evening.  Dispense: 100 each; Refill: 5  -     Microalbumin/Creatinine Ratio, Urine; Future; Expected date: 08/20/2025  -     Comprehensive Metabolic Panel; Future; Expected date: 08/20/2025  -     Hemoglobin A1C; Future; Expected date: 08/20/2025    2. Mixed hyperlipidemia  Assessment & Plan:  -Reviewed Lipid panel 118/61/41/64  -Discussed LDL goal <70.  -Discussed lifestyle changes.  -Continue atorvastatin 20 mg HS.  -Discussed importance of lipid control in setting of diabetes  -Discussed the importance of taking statin every night and lifestyle changes to slow the risk of a cardiovascular event such as CVA or MI.    -Update labs prior to f/u visit.    Orders:  -     Lipid Panel; Future; Expected date: 08/20/2025    3. Uses self-applied continuous glucose monitoring device  Assessment & Plan:  -Dexcom G7 readings reviewed with patient.  -Interpretation:   -Diabetes well controlled; BG above target range goal; FBG at goal;  PPG excursions absent.   -Medication regimen adjusted " according to current findings.   -See glucose readings below.  -Re-eval upon next visit.          FOLLOW UP  Follow up in about 3 months (around 8/20/2025).

## 2025-05-23 DIAGNOSIS — E11.65 TYPE 2 DIABETES MELLITUS WITH HYPERGLYCEMIA, WITHOUT LONG-TERM CURRENT USE OF INSULIN: ICD-10-CM

## 2025-05-23 DIAGNOSIS — E78.5 HYPERLIPIDEMIA, UNSPECIFIED HYPERLIPIDEMIA TYPE: ICD-10-CM

## 2025-05-23 RX ORDER — ATORVASTATIN CALCIUM 20 MG/1
20 TABLET, FILM COATED ORAL DAILY
Qty: 90 TABLET | Refills: 1 | OUTPATIENT
Start: 2025-05-23

## 2025-05-23 RX ORDER — PEN NEEDLE, DIABETIC 30 GX3/16"
1 NEEDLE, DISPOSABLE MISCELLANEOUS NIGHTLY
Qty: 100 EACH | Refills: 5 | OUTPATIENT
Start: 2025-05-23

## 2025-05-23 NOTE — TELEPHONE ENCOUNTER
"----- Message from Allie sent at 5/23/2025  2:31 PM CDT -----  Contact: [phone]  Requesting an RX refill or new RX.Is this a refill or new RX: RefillRX name and strength atorvastatin (LIPITOR) 20 MG tabletIs this a 30 day or 90 day RX: 90Pharmacy name and phone # [facility]/pharmacy #[id number] - [place], [place] [id number] [facility] [address]: [phone] Fax: [phone] The doctors have asked that we provide their patients with the following 2 reminders -- prescription refills can take up to 72 hours, and a friendly reminder that in the future you can use your [facility] account to request refills: Diabetic or Medical Supplies.What supplies are needed: pen needle, diabetic (BD KAREEN 2ND GEN PEN NEEDLE) 32 gauge x 5/32" NdleWhat is the brand name of the supplies: pen needle, diabetic (BD KAREEN 2ND GEN PEN NEEDLE) 32 gauge x 5/32" NdleIs this a refill or new prescription:  RefillWho prescribed the supplies:  Prescribing Provider Encounter ProviderBradford Vincent FNP-C Pharmacy or company name, phone # and location:  [facility]/pharmacy #[id number] - [place], [place] - [id number] [facility] [address]: [phone] Fax: [phone] the patient like a call back, or a response through their [facility] portal?:   Comments:  "

## 2025-05-23 NOTE — TELEPHONE ENCOUNTER
No care due was identified.  [facility] Embedded Care Due Messages. Reference number: [id number].   5/23/2025 2:58:10 PM CDT

## 2025-06-23 ENCOUNTER — OFFICE VISIT (OUTPATIENT)
Dept: PRIMARY CARE CLINIC | Facility: CLINIC | Age: 65
End: 2025-06-23
Payer: MEDICARE

## 2025-06-23 VITALS
OXYGEN SATURATION: 97 % | SYSTOLIC BLOOD PRESSURE: 134 MMHG | HEART RATE: 77 BPM | WEIGHT: 156.5 LBS | DIASTOLIC BLOOD PRESSURE: 66 MMHG | HEIGHT: 68 IN | RESPIRATION RATE: 19 BRPM | BODY MASS INDEX: 23.72 KG/M2

## 2025-06-23 DIAGNOSIS — E78.5 HYPERLIPIDEMIA, UNSPECIFIED HYPERLIPIDEMIA TYPE: ICD-10-CM

## 2025-06-23 DIAGNOSIS — Z23 NEED FOR DIPHTHERIA-TETANUS-PERTUSSIS (TDAP) VACCINE: ICD-10-CM

## 2025-06-23 DIAGNOSIS — I49.9 IRREGULAR HEART BEATS: ICD-10-CM

## 2025-06-23 DIAGNOSIS — I10 ESSENTIAL HYPERTENSION: ICD-10-CM

## 2025-06-23 DIAGNOSIS — I49.8 BIGEMINY: ICD-10-CM

## 2025-06-23 DIAGNOSIS — Z79.4 TYPE 2 DIABETES MELLITUS WITH HYPERGLYCEMIA, WITH LONG-TERM CURRENT USE OF INSULIN: Primary | ICD-10-CM

## 2025-06-23 DIAGNOSIS — R94.31 ABNORMAL EKG: ICD-10-CM

## 2025-06-23 DIAGNOSIS — E11.65 TYPE 2 DIABETES MELLITUS WITH HYPERGLYCEMIA, WITH LONG-TERM CURRENT USE OF INSULIN: Primary | ICD-10-CM

## 2025-06-23 LAB
OHS QRS DURATION: 88 MS
OHS QTC CALCULATION: 431 MS

## 2025-06-23 PROCEDURE — 99214 OFFICE O/P EST MOD 30 MIN: CPT | Mod: S$PBB,,, | Performed by: INTERNAL MEDICINE

## 2025-06-23 PROCEDURE — 99999 PR PBB SHADOW E&M-EST. PATIENT-LVL IV: CPT | Mod: PBBFAC,,, | Performed by: INTERNAL MEDICINE

## 2025-06-23 PROCEDURE — 93010 ELECTROCARDIOGRAM REPORT: CPT | Mod: S$PBB,,, | Performed by: INTERNAL MEDICINE

## 2025-06-23 PROCEDURE — 99214 OFFICE O/P EST MOD 30 MIN: CPT | Mod: PBBFAC,PN,25 | Performed by: INTERNAL MEDICINE

## 2025-06-23 PROCEDURE — 93005 ELECTROCARDIOGRAM TRACING: CPT | Mod: PBBFAC,PN | Performed by: INTERNAL MEDICINE

## 2025-06-23 RX ORDER — AMLODIPINE BESYLATE 10 MG/1
10 TABLET ORAL DAILY
Qty: 90 TABLET | Refills: 1 | Status: SHIPPED | OUTPATIENT
Start: 2025-06-23

## 2025-06-23 RX ORDER — LOSARTAN POTASSIUM 50 MG/1
50 TABLET ORAL DAILY
Qty: 90 TABLET | Refills: 1 | Status: SHIPPED | OUTPATIENT
Start: 2025-06-23

## 2025-06-23 RX ORDER — ATORVASTATIN CALCIUM 20 MG/1
20 TABLET, FILM COATED ORAL DAILY
Qty: 90 TABLET | Refills: 1 | Status: SHIPPED | OUTPATIENT
Start: 2025-06-23

## 2025-06-23 NOTE — PROGRESS NOTES
"Subjective     Patient ID: Parth Lund is a 65 y.o. male.    Chief Complaint: Diabetes and Hypertension    Last seen by me 4 months ago after a long absence with diabetes uncontrolled. Had been evaluated by NP in Endocrinology and started on Synjardy, low dose Tresiba and samples of low dose Ozempic given. Repeat labs last month show marked improvement in glucose control. He states Ozempic is not well covered, will have to pay $500 when the samples run out - we may not be able to continue this med. And he would rather not lose any more weight.     PMH:   Hypertension.   Diabetes type 2, HbA1c 11.0% Feb. '25.  Hyperlipidemia, LDL 71 Feb. '25.  Erectile Dysfunction.    PSH: None.     [facility], reports one polyp removed. PSA 1.9 Feb. '25. Eye exam 2025. Vaccines reviewed.    Social: Non-smoker, social alcohol. , 4 children. Retired .    FMH: HTN, DM, Chol, Stroke, Heart dis, Throat cancer.     NKDA.     Medications: list reviewed and reconciled.     Review of Systems   Constitutional:  Negative for fatigue and fever.   Respiratory:  Negative for cough and shortness of breath.    Cardiovascular:  Negative for chest pain, palpitations and leg swelling.   Gastrointestinal:  Negative for abdominal pain, diarrhea, nausea and vomiting.   Neurological:  Negative for dizziness, syncope, weakness and headaches.          Objective   Vitals:    06/23/25 0853   BP: 134/66   BP Location: Right arm   Patient Position: Sitting   Pulse: 77   Resp: 19   SpO2: 97%   Weight: 71 kg (156 lb 8.4 oz)    From 153 last visit, 176 two years ago.    Height: 5' 8" (1.727 m)      Physical Exam  Constitutional:       General: He is not in acute distress.     Appearance: He is not ill-appearing.   Cardiovascular:      Rate and Rhythm: Normal rate. Rhythm irregular.      Comments: Frequent ectopy in pattern of bigeminy.  Pulmonary:      Effort: Pulmonary effort is normal. No respiratory distress.      " Breath sounds: Normal breath sounds. No wheezing, rhonchi or rales.   Musculoskeletal:         General: Normal range of motion.      Right lower leg: No edema.      Left lower leg: No edema.   Skin:     General: Skin is warm and dry.   Neurological:      Mental Status: He is alert and oriented to person, place, and time.      Cranial Nerves: No cranial nerve deficit.      Coordination: Coordination normal.      Gait: Gait normal.   Psychiatric:         Mood and Affect: Mood normal.         Behavior: Behavior normal.       No visits with results within 3 Week(s) from this visit.   Latest known visit with results is:   Lab Visit on 05/09/2025   Component Date Value    Cholesterol Total 05/09/2025 118 (L)     Triglyceride 05/09/2025 61     HDL Cholesterol 05/09/2025 41     LDL Cholesterol 05/09/2025 64.8     HDL/Cholesterol Ratio 05/09/2025 34.7     Cholesterol/HDL Ratio 05/09/2025 2.9     Non HDL Cholesterol 05/09/2025 77     Sodium 05/09/2025 141     Potassium 05/09/2025 3.9     Chloride 05/09/2025 105     CO2 05/09/2025 28     Glucose 05/09/2025 114 (H)     BUN 05/09/2025 13     Creatinine 05/09/2025 1.0     Calcium 05/09/2025 9.2     Protein Total 05/09/2025 6.7     Albumin 05/09/2025 3.4 (L)     Bilirubin Total 05/09/2025 0.3     ALP 05/09/2025 145     AST 05/09/2025 26     ALT 05/09/2025 48 (H)     Anion Gap 05/09/2025 8     eGFR 05/09/2025 >60     Hemoglobin A1c 05/09/2025 7.5 (H)     Estimated Average Glucose 05/09/2025 169 (H)      EKG: Sinus rhythm with frequent PAC's in pattern of bigeminy, voltage criteria for LVH.     Assessment and Plan     1. Type 2 diabetes mellitus with hyperglycemia, with long-term current use of insulin        -     much improved, continue present care - may discontinue Ozempic if not covered, would adjust the insulin.         -     follow up with Endocrinology NP as scheduled in August with labs.     2. Essential hypertension controlled  -     losartan (COZAAR) 50 MG tablet; Take 1  tablet (50 mg total) by mouth once daily.  Dispense: 90 tablet; Refill: 1  -     amLODIPine (NORVASC) 10 MG tablet; Take 1 tablet (10 mg total) by mouth once daily.  Dispense: 90 tablet; Refill: 1    3. Hyperlipidemia, unspecified hyperlipidemia type controlled  -     atorvastatin (LIPITOR) 20 MG tablet; Take 1 tablet (20 mg total) by mouth once daily.  Dispense: 90 tablet; Refill: 1    4. Irregular heart beats  -     IN OFFICE EKG 12-LEAD (to Muse)  -     Echo; Future  -     Holter monitor - 48 hour; Future    5. Bigeminy  -     Holter monitor - 48 hour; Future    6. Abnormal EKG  -     Echo; Future    7. Need for diphtheria-tetanus-pertussis (Tdap) vaccine - Tdap today.        Follow up in about 6 months (around 12/23/2025).

## 2025-06-30 ENCOUNTER — HOSPITAL ENCOUNTER (OUTPATIENT)
Dept: CARDIOLOGY | Facility: HOSPITAL | Age: 65
Discharge: HOME | End: 2025-06-30
Attending: INTERNAL MEDICINE
Payer: MEDICARE

## 2025-06-30 VITALS
SYSTOLIC BLOOD PRESSURE: 134 MMHG | DIASTOLIC BLOOD PRESSURE: 66 MMHG | HEIGHT: 68 IN | BODY MASS INDEX: 23.64 KG/M2 | HEART RATE: 74 BPM | WEIGHT: 156 LBS

## 2025-06-30 DIAGNOSIS — R94.31 ABNORMAL EKG: ICD-10-CM

## 2025-06-30 DIAGNOSIS — I49.9 IRREGULAR HEART BEATS: ICD-10-CM

## 2025-06-30 LAB
AORTIC SIZE INDEX (SOV): 2 CM/M2
AORTIC SIZE INDEX: 1.9 CM/M2
ASCENDING AORTA: 3.5 CM
AV AREA BY CONTINUOUS VTI: 5.5 CM2
AV INDEX (PROSTH): 0.84
AV LVOT MEAN GRADIENT: 3 MMHG
AV LVOT PEAK GRADIENT: 6 MMHG
AV MEAN GRADIENT: 4 MMHG
AV PEAK GRADIENT: 7 MMHG
AV VALVE AREA BY VELOCITY RATIO: 6.1 CM²
AV VALVE AREA: 5.5 CM2
AV VELOCITY RATIO: 0.92
BSA FOR ECHO PROCEDURE: 1.84 M2
CV ECHO LV RWT: 0.34 CM
DOP CALC AO PEAK VEL: 1.3 M/S
DOP CALC AO VTI: 29.2 CM
DOP CALC LVOT AREA: 6.6 CM2
DOP CALC LVOT DIAMETER: 2.9 CM
DOP CALC LVOT PEAK VEL: 1.2 M/S
DOP CALC LVOT STROKE VOLUME: 161.1 CM3
DOP CALCLVOT PEAK VEL VTI: 24.4 CM
E WAVE DECELERATION TIME: 248 MS
E/A RATIO: 0.93
E/E' RATIO: 7 M/S
ECHO EF ESTIMATED: 55 %
ECHO LV POSTERIOR WALL: 0.8 CM (ref 0.6–1.1)
FRACTIONAL SHORTENING: 29.8 % (ref 28–44)
INTERVENTRICULAR SEPTUM: 0.6 CM (ref 0.6–1.1)
IVC DIAMETER: 1.6 CM
LA MAJOR: 5.2 CM
LA MINOR: 4.8 CM
LA WIDTH: 3.6 CM
LEFT ATRIUM SIZE: 4 CM
LEFT ATRIUM VOLUME INDEX MOD: 26 ML/M2
LEFT ATRIUM VOLUME INDEX: 33 ML/M2
LEFT ATRIUM VOLUME MOD: 48 ML
LEFT ATRIUM VOLUME: 61 CM3
LEFT INTERNAL DIMENSION IN SYSTOLE: 3.3 CM (ref 2.1–4)
LEFT VENTRICLE DIASTOLIC VOLUME INDEX: 54.89 ML/M2
LEFT VENTRICLE DIASTOLIC VOLUME: 101 ML
LEFT VENTRICLE MASS INDEX: 56 G/M2
LEFT VENTRICLE SYSTOLIC VOLUME INDEX: 24.5 ML/M2
LEFT VENTRICLE SYSTOLIC VOLUME: 45 ML
LEFT VENTRICULAR INTERNAL DIMENSION IN DIASTOLE: 4.7 CM (ref 3.5–6)
LEFT VENTRICULAR MASS: 103.1 G
LV LATERAL E/E' RATIO: 6.6
LV SEPTAL E/E' RATIO: 7.6
MV PEAK A VEL: 0.57 M/S
MV PEAK E VEL: 0.53 M/S
OHS CV RV/LV RATIO: 0.96 CM
PISA TR MAX VEL: 2.5 M/S
RA MAJOR: 4.72 CM
RA PRESSURE ESTIMATED: 3 MMHG
RA WIDTH: 3.79 CM
RIGHT VENTRICLE DIASTOLIC BASEL DIMENSION: 4.5 CM
RV TB RVSP: 6 MMHG
RV TISSUE DOPPLER FREE WALL SYSTOLIC VELOCITY 1 (APICAL 4 CHAMBER VIEW): 16.22 CM/S
SINUS: 3.7 CM
STJ: 3.5 CM
TDI LATERAL: 0.08 M/S
TDI SEPTAL: 0.07 M/S
TDI: 0.08 M/S
TRICUSPID ANNULAR PLANE SYSTOLIC EXCURSION: 1.7 CM
TV PEAK GRADIENT: 25 MMHG
TV REST PULMONARY ARTERY PRESSURE: 28 MMHG
Z-SCORE OF LEFT VENTRICULAR DIMENSION IN END DIASTOLE: -0.83
Z-SCORE OF LEFT VENTRICULAR DIMENSION IN END SYSTOLE: 0.36

## 2025-06-30 PROCEDURE — 93306 TTE W/DOPPLER COMPLETE: CPT | Mod: 26,,, | Performed by: INTERNAL MEDICINE

## 2025-06-30 PROCEDURE — 93306 TTE W/DOPPLER COMPLETE: CPT

## 2025-07-02 ENCOUNTER — TELEPHONE (OUTPATIENT)
Dept: PRIMARY CARE CLINIC | Facility: CLINIC | Age: 65
End: 2025-07-02
Payer: MEDICARE

## 2025-07-02 ENCOUNTER — RESULTS FOLLOW-UP (OUTPATIENT)
Dept: PRIMARY CARE CLINIC | Facility: CLINIC | Age: 65
End: 2025-07-02

## 2025-07-21 DIAGNOSIS — L30.0 NUMMULAR ECZEMA: Primary | ICD-10-CM

## 2025-07-23 ENCOUNTER — CLINICAL SUPPORT (OUTPATIENT)
Dept: CARDIOLOGY | Facility: HOSPITAL | Age: 65
End: 2025-07-23
Attending: INTERNAL MEDICINE
Payer: MEDICARE

## 2025-07-23 DIAGNOSIS — I49.9 IRREGULAR HEART BEATS: ICD-10-CM

## 2025-07-23 DIAGNOSIS — I49.8 BIGEMINY: ICD-10-CM

## 2025-07-23 PROCEDURE — 93226 XTRNL ECG REC<48 HR SCAN A/R: CPT

## 2025-07-23 RX ORDER — TRIAMCINOLONE ACETONIDE 1 MG/G
CREAM TOPICAL
Qty: 60 G | Refills: 1 | Status: SHIPPED | OUTPATIENT
Start: 2025-07-23

## 2025-07-28 ENCOUNTER — TELEPHONE (OUTPATIENT)
Dept: INTERNAL MEDICINE | Facility: CLINIC | Age: 65
End: 2025-07-28
Payer: MEDICARE

## 2025-07-28 DIAGNOSIS — E11.65 TYPE 2 DIABETES MELLITUS WITH HYPERGLYCEMIA, WITHOUT LONG-TERM CURRENT USE OF INSULIN: ICD-10-CM

## 2025-07-28 RX ORDER — INSULIN DEGLUDEC 100 U/ML
10 INJECTION, SOLUTION SUBCUTANEOUS NIGHTLY
Status: CANCELLED
Start: 2025-07-28 | End: 2026-07-28

## 2025-07-28 NOTE — TELEPHONE ENCOUNTER
Received fax from pharmacy informing that insurance is no longer cobering medication tresiba. Preferred medications are insulin degludec pen, insulin-glargine- yfrn , glargine max s, glargine solostar.

## 2025-07-29 RX ORDER — INSULIN GLARGINE 300 [IU]/ML
10 INJECTION, SOLUTION SUBCUTANEOUS NIGHTLY
Qty: 3 ML | Refills: 3 | Status: SHIPPED | OUTPATIENT
Start: 2025-07-29

## 2025-07-31 ENCOUNTER — TELEPHONE (OUTPATIENT)
Dept: PRIMARY CARE CLINIC | Facility: CLINIC | Age: 65
End: 2025-07-31
Payer: MEDICARE

## 2025-07-31 NOTE — TELEPHONE ENCOUNTER
48 hour heart monitor shows very rare skipped beats, no dangerous irregular rhythm. Continue present care, no further testing needed at this time.

## 2025-08-18 ENCOUNTER — LAB VISIT (OUTPATIENT)
Dept: LAB | Facility: HOSPITAL | Age: 65
End: 2025-08-18
Payer: MEDICARE

## 2025-08-18 DIAGNOSIS — E11.65 TYPE 2 DIABETES MELLITUS WITH HYPERGLYCEMIA, WITHOUT LONG-TERM CURRENT USE OF INSULIN: ICD-10-CM

## 2025-08-18 DIAGNOSIS — E78.2 MIXED HYPERLIPIDEMIA: ICD-10-CM

## 2025-08-18 LAB
ALBUMIN SERPL BCP-MCNC: 3.5 G/DL (ref 3.5–5.2)
ALP SERPL-CCNC: 122 UNIT/L (ref 40–150)
ALT SERPL W/O P-5'-P-CCNC: 21 UNIT/L (ref 0–55)
ANION GAP (OHS): 8 MMOL/L (ref 8–16)
AST SERPL-CCNC: 22 UNIT/L (ref 0–50)
BILIRUB SERPL-MCNC: 0.5 MG/DL (ref 0.1–1)
BUN SERPL-MCNC: 14 MG/DL (ref 8–23)
CALCIUM SERPL-MCNC: 9.4 MG/DL (ref 8.7–10.5)
CHLORIDE SERPL-SCNC: 104 MMOL/L (ref 95–110)
CHOLEST SERPL-MCNC: 124 MG/DL (ref 120–199)
CHOLEST/HDLC SERPL: 2.5 {RATIO} (ref 2–5)
CO2 SERPL-SCNC: 26 MMOL/L (ref 23–29)
CREAT SERPL-MCNC: 1.2 MG/DL (ref 0.5–1.4)
EAG (OHS): 174 MG/DL (ref 68–131)
GFR SERPLBLD CREATININE-BSD FMLA CKD-EPI: >60 ML/MIN/1.73/M2
GLUCOSE SERPL-MCNC: 136 MG/DL (ref 70–110)
HBA1C MFR BLD: 7.7 % (ref 4–5.6)
HDLC SERPL-MCNC: 49 MG/DL (ref 40–75)
HDLC SERPL: 39.5 % (ref 20–50)
LDLC SERPL CALC-MCNC: 64.6 MG/DL (ref 63–159)
NONHDLC SERPL-MCNC: 75 MG/DL
POTASSIUM SERPL-SCNC: 3.7 MMOL/L (ref 3.5–5.1)
PROT SERPL-MCNC: 6.7 GM/DL (ref 6–8.4)
SODIUM SERPL-SCNC: 138 MMOL/L (ref 136–145)
TRIGL SERPL-MCNC: 52 MG/DL (ref 30–150)

## 2025-08-18 PROCEDURE — 36415 COLL VENOUS BLD VENIPUNCTURE: CPT | Mod: PN

## 2025-08-18 PROCEDURE — 80053 COMPREHEN METABOLIC PANEL: CPT

## 2025-08-18 PROCEDURE — 80061 LIPID PANEL: CPT

## 2025-08-18 PROCEDURE — 83036 HEMOGLOBIN GLYCOSYLATED A1C: CPT

## 2025-08-20 ENCOUNTER — OFFICE VISIT (OUTPATIENT)
Dept: INTERNAL MEDICINE | Facility: CLINIC | Age: 65
End: 2025-08-20
Payer: MEDICARE

## 2025-08-20 VITALS
OXYGEN SATURATION: 96 % | HEART RATE: 93 BPM | DIASTOLIC BLOOD PRESSURE: 62 MMHG | SYSTOLIC BLOOD PRESSURE: 126 MMHG | WEIGHT: 154.75 LBS | BODY MASS INDEX: 23.45 KG/M2 | HEIGHT: 68 IN

## 2025-08-20 DIAGNOSIS — Z97.8 USES SELF-APPLIED CONTINUOUS GLUCOSE MONITORING DEVICE: ICD-10-CM

## 2025-08-20 DIAGNOSIS — E11.65 TYPE 2 DIABETES MELLITUS WITH HYPERGLYCEMIA, WITHOUT LONG-TERM CURRENT USE OF INSULIN: Primary | ICD-10-CM

## 2025-08-20 DIAGNOSIS — E78.2 MIXED HYPERLIPIDEMIA: ICD-10-CM

## 2025-08-20 DIAGNOSIS — I10 ESSENTIAL HYPERTENSION: ICD-10-CM

## 2025-08-20 PROCEDURE — 99999 PR PBB SHADOW E&M-EST. PATIENT-LVL IV: CPT | Mod: PBBFAC,,,

## 2025-08-20 PROCEDURE — 99214 OFFICE O/P EST MOD 30 MIN: CPT | Mod: PBBFAC,PO

## 2025-08-20 RX ORDER — SEMAGLUTIDE 0.68 MG/ML
0.25 INJECTION, SOLUTION SUBCUTANEOUS
Qty: 3 ML | Refills: 3 | Status: SHIPPED | OUTPATIENT
Start: 2025-08-20

## 2025-08-20 RX ORDER — LOSARTAN POTASSIUM 50 MG/1
50 TABLET ORAL DAILY
Qty: 90 TABLET | Refills: 1 | Status: SHIPPED | OUTPATIENT
Start: 2025-08-20

## 2025-08-20 RX ORDER — ATORVASTATIN CALCIUM 20 MG/1
20 TABLET, FILM COATED ORAL NIGHTLY
Qty: 90 TABLET | Refills: 1 | Status: SHIPPED | OUTPATIENT
Start: 2025-08-20

## 2025-08-20 RX ORDER — EMPAGLIFLOZIN, METFORMIN HYDROCHLORIDE 12.5; 1 MG/1; MG/1
1 TABLET, EXTENDED RELEASE ORAL DAILY
Qty: 90 TABLET | Refills: 2 | Status: SHIPPED | OUTPATIENT
Start: 2025-08-20

## 2025-08-20 RX ORDER — PEN NEEDLE, DIABETIC 30 GX3/16"
1 NEEDLE, DISPOSABLE MISCELLANEOUS NIGHTLY
Qty: 100 EACH | Refills: 5 | Status: SHIPPED | OUTPATIENT
Start: 2025-08-20

## 2025-08-20 RX ORDER — AMLODIPINE BESYLATE 10 MG/1
10 TABLET ORAL DAILY
Qty: 90 TABLET | Refills: 1 | Status: SHIPPED | OUTPATIENT
Start: 2025-08-20